# Patient Record
Sex: FEMALE | HISPANIC OR LATINO | Employment: FULL TIME | URBAN - METROPOLITAN AREA
[De-identification: names, ages, dates, MRNs, and addresses within clinical notes are randomized per-mention and may not be internally consistent; named-entity substitution may affect disease eponyms.]

---

## 2017-02-09 ENCOUNTER — OFFICE VISIT (OUTPATIENT)
Dept: INTERNAL MEDICINE | Facility: CLINIC | Age: 37
End: 2017-02-09
Payer: COMMERCIAL

## 2017-02-09 VITALS
DIASTOLIC BLOOD PRESSURE: 72 MMHG | TEMPERATURE: 97.3 F | OXYGEN SATURATION: 99 % | BODY MASS INDEX: 25.75 KG/M2 | HEART RATE: 80 BPM | HEIGHT: 61 IN | SYSTOLIC BLOOD PRESSURE: 112 MMHG | WEIGHT: 136.4 LBS

## 2017-02-09 DIAGNOSIS — R30.0 DYSURIA: Primary | ICD-10-CM

## 2017-02-09 DIAGNOSIS — B96.89 BACTERIAL VAGINOSIS: ICD-10-CM

## 2017-02-09 DIAGNOSIS — N76.0 BACTERIAL VAGINOSIS: ICD-10-CM

## 2017-02-09 LAB
ALBUMIN UR-MCNC: NEGATIVE MG/DL
APPEARANCE UR: CLEAR
BILIRUB UR QL STRIP: NEGATIVE
COLOR UR AUTO: YELLOW
GLUCOSE UR STRIP-MCNC: NEGATIVE MG/DL
HGB UR QL STRIP: ABNORMAL
KETONES UR STRIP-MCNC: NEGATIVE MG/DL
LEUKOCYTE ESTERASE UR QL STRIP: ABNORMAL
MICRO REPORT STATUS: ABNORMAL
NITRATE UR QL: NEGATIVE
NON-SQ EPI CELLS #/AREA URNS LPF: ABNORMAL /LPF
PH UR STRIP: 5.5 PH (ref 5–7)
RBC #/AREA URNS AUTO: ABNORMAL /HPF (ref 0–2)
SP GR UR STRIP: 1.02 (ref 1–1.03)
SPECIMEN SOURCE: ABNORMAL
URN SPEC COLLECT METH UR: ABNORMAL
UROBILINOGEN UR STRIP-ACNC: 0.2 EU/DL (ref 0.2–1)
WBC #/AREA URNS AUTO: ABNORMAL /HPF (ref 0–2)
WET PREP SPEC: ABNORMAL

## 2017-02-09 PROCEDURE — 99214 OFFICE O/P EST MOD 30 MIN: CPT | Performed by: INTERNAL MEDICINE

## 2017-02-09 PROCEDURE — 87086 URINE CULTURE/COLONY COUNT: CPT | Performed by: INTERNAL MEDICINE

## 2017-02-09 PROCEDURE — 87210 SMEAR WET MOUNT SALINE/INK: CPT | Performed by: INTERNAL MEDICINE

## 2017-02-09 PROCEDURE — 81001 URINALYSIS AUTO W/SCOPE: CPT | Performed by: INTERNAL MEDICINE

## 2017-02-09 RX ORDER — METRONIDAZOLE 500 MG/1
500 TABLET ORAL 2 TIMES DAILY
Qty: 14 TABLET | Refills: 0 | Status: SHIPPED | OUTPATIENT
Start: 2017-02-09 | End: 2017-02-27

## 2017-02-09 NOTE — PATIENT INSTRUCTIONS
For your bacterial vaginosis: Please take the antibiotics and let us know if you are not better after 3 complete days on it.    We will also process your urine culture and let you know if your urine grows any bacteria.      Vaginal Infection: Bacterial Vaginosis  Both good and bad bacteria are present in a healthy vagina. Bacterial vaginosis (BV) occurs when these bacteria get out of balance. The numbers of good bacteria decrease. This allows the numbers of bad bacteria to increase and cause BV. In most cases, BV is not a serious problem. This sheet tells you more about BV and how it can be treated.  Causes of Bacterial Vaginosis  The cause of BV is not clear. Douching may lead to it. Having sex with a new partner or more than 1 partner makes it more likely.  Symptoms of Bacterial Vaginosis  Symptoms of BV vary for each woman. Some women have few symptoms or none at all. If symptoms are present, they can include:    Thin, milky white or gray discharge    Unpleasant  fishy  odor    Irritation, itching, and burning at opening of vagina.    Burning or irritation with sex or urination  Diagnosing Bacterial Vaginosis  Your health care provider will ask about your symptoms and health history. He or she will also perform a pelvic exam. This is an exam of your vagina and cervix. A sample of vaginal fluid or discharge may be taken. This sample is checked for signs of BV.  Treating Bacterial Vaginosis  BV is often treated with antibiotics. They may be given in oral pill form or as a vaginal cream. To use these medications:    Be sure to take all of your medication, even if your symptoms go away.    If you re taking antibiotic pills, do not drink alcohol until you re finished with all of your medication.    If you re using vaginal cream, apply it as directed. Be aware that the cream may make condoms and diaphragms less effective.    Call your health care provider if symptoms do not go away within 4 days of starting treatment.  Also call if you have a reaction to the medication.  Why Treatment Matters  Even if you have no symptoms or your symptoms are mild, BV should be treated. Untreated BV can lead to health problems such as:    Increased risk of  delivery if you re pregnant.    Increased risk of complications after surgery on the reproductive organs.    Possible increased risk of pelvic inflammatory disease (PID).     3232-0640 [employer]. [address] [place] [address]. All rights reserved. This information is not intended as a substitute for professional medical care. Always follow your healthcare professional's instructions.

## 2017-02-09 NOTE — PROGRESS NOTES
SUBJECTIVE:                                                    Gogo Bourgeois is a 36 year old female who presents to clinic today for the following health issues:        URINARY TRACT SYMPTOMS      Duration: 3 days ago    Description  dysuria, frequency, urgency, odor and back pain    Intensity:  8/10    Accompanying signs and symptoms:  Fever/chills: no   Flank pain YES  Nausea and vomiting: no   Vaginal symptoms: discharge, odor and itching  Abdominal/Pelvic Pain: YES    History  History of frequent UTI's: YES  History of kidney stones: no   Sexually Active: YES  Possibility of pregnancy: No    Precipitating or alleviating factors: None    Therapies tried and outcome: increase fluid intake       She also had sleeve gastrectomy several years ago, followed by excision of loose skin 1-2 years later, both completed in [place]. One of those areas where loose skin was excised was in the groin area and she then noted 2-3 hard, small bumps there which become a bit tender at times. They do not seem to be growing. She did show them to her Ob/Gyn, who did not seem concerned.  She is requesting for me to assess these lesions, too.      History of breast augmentation surgery:  In [place] in 2012; she is not sure if the implants were saline vs silicone.  Had pain a for a week of the breasts, w/o trauma, now gone.    Problem list and histories reviewed & adjusted, as indicated.  Additional history: as documented    Patient Active Problem List   Diagnosis     S/P LEEP of cervix     Traction alopecia     S/P gastric bypass     Dyshidrotic eczema     Past Surgical History   Procedure Laterality Date     C removal gallbladder  12/2001     Laparoscopic bypass gastric  8/23/11     Sleeve done in [place]     Leep tx, cervical  2002     C mammogram, w/bilat. implants  2012     in [place]     Cosmetic abdominoplasty  2012     plus plast of arms and inner thighs     Tubal ligation  2014       Social History   Substance Use Topics  "    Smoking status: Never Smoker      Smokeless tobacco: Never Used      Comment: Nonsmoking household     Alcohol Use: No     Family History   Problem Relation Age of Onset     Hypertension Maternal Grandmother      Respiratory Paternal Grandmother      smoker     Alcohol/Drug Paternal Grandfather      alcohlism, cirrhosis of liver         Current Outpatient Prescriptions   Medication Sig Dispense Refill     metroNIDAZOLE (FLAGYL) 500 MG tablet Take 1 tablet (500 mg) by mouth 2 times daily 14 tablet 0     Multiple Vitamin (MULTI-VITAMIN PO)        ==============================================================  ROS:  Constitutional, HEENT, cardiovascular, pulmonary, GI, , musculoskeletal, neuro, skin, endocrine and psych systems are negative, except as otherwise noted.       OBJECTIVE:                                                    /72 mmHg  Pulse 80  Temp(Src) 97.3  F (36.3  C) (Oral)  Ht 5' 1\" (1.549 m)  Wt 136 lb 6.4 oz (61.871 kg)  BMI 25.79 kg/m2  SpO2 99%  Body mass index is 25.79 kg/(m^2).     GEN: not in acute distress  :   no abnormal vaginal or cervical lesions or discharge.   There are small, hard, mobile, and currently non-tender to palpation, ovoid, flesh-colored lesions about 0.3 x 0.7cm in size 2-3 total and found on either side of the labia majora.   Results for orders placed or performed in visit on 02/09/17   *UA reflex to Microscopic and Culture ([facility] (except [place] and [place])   Result Value Ref Range    Color Urine Yellow     Appearance Urine Clear     Glucose Urine Negative NEG mg/dL    Bilirubin Urine Negative NEG    Ketones Urine Negative NEG mg/dL    Specific Gravity Urine 1.025 1.003 - 1.035    Blood Urine Moderate (A) NEG    pH Urine 5.5 5.0 - 7.0 pH    Protein Albumin Urine Negative NEG mg/dL    Urobilinogen Urine 0.2 0.2 - 1.0 EU/dL    Nitrite Urine Negative NEG    Leukocyte Esterase Urine Small (A) NEG    Source Midstream Urine  "   Urine Microscopic   Result Value Ref Range    WBC Urine 2-5 (A) 0 - 2 /HPF    RBC Urine 25-50 (A) 0 - 2 /HPF    Squamous Epithelial /LPF Urine Few FEW /LPF   Wet prep   Result Value Ref Range    Specimen Description Vagina     Wet Prep No Trichomonas seen  Clue cells seen  Yeast seen   (A)     Micro Report Status FINAL 02/09/2017         ASSESSMENT/PLAN:                                                        ICD-10-CM    1. Dysuria R30.0 Wet prep     *UA reflex to Microscopic and Culture ([facility] (except [place] and [place])     Urine Microscopic     metroNIDAZOLE (FLAGYL) 500 MG tablet     Urine Culture Aerobic Bacterial   2. Bacterial vaginosis N76.0 metroNIDAZOLE (FLAGYL) 500 MG tablet    B96.89      Time spent coordinating care was approximately 30 minutes out of a total of approximately 40 minutes (which was the total duration of the appointment) including the diagnosis, prognosis and treatment of the above medical conditions.     Patient Instructions     For your bacterial vaginosis: Please take the antibiotics and let us know if you are not better after 3 complete days on it.    We will also process your urine culture and let you know if your urine grows any bacteria.      Vaginal Infection: Bacterial Vaginosis  Both good and bad bacteria are present in a healthy vagina. Bacterial vaginosis (BV) occurs when these bacteria get out of balance. The numbers of good bacteria decrease. This allows the numbers of bad bacteria to increase and cause BV. In most cases, BV is not a serious problem. This sheet tells you more about BV and how it can be treated.  Causes of Bacterial Vaginosis  The cause of BV is not clear. Douching may lead to it. Having sex with a new partner or more than 1 partner makes it more likely.  Symptoms of Bacterial Vaginosis  Symptoms of BV vary for each woman. Some women have few symptoms or none at all. If symptoms are present, they can include:    Thin,  milky white or gray discharge    Unpleasant  fishy  odor    Irritation, itching, and burning at opening of vagina.    Burning or irritation with sex or urination  Diagnosing Bacterial Vaginosis  Your health care provider will ask about your symptoms and health history. He or she will also perform a pelvic exam. This is an exam of your vagina and cervix. A sample of vaginal fluid or discharge may be taken. This sample is checked for signs of BV.  Treating Bacterial Vaginosis  BV is often treated with antibiotics. They may be given in oral pill form or as a vaginal cream. To use these medications:    Be sure to take all of your medication, even if your symptoms go away.    If you re taking antibiotic pills, do not drink alcohol until you re finished with all of your medication.    If you re using vaginal cream, apply it as directed. Be aware that the cream may make condoms and diaphragms less effective.    Call your health care provider if symptoms do not go away within 4 days of starting treatment. Also call if you have a reaction to the medication.  Why Treatment Matters  Even if you have no symptoms or your symptoms are mild, BV should be treated. Untreated BV can lead to health problems such as:    Increased risk of  delivery if you re pregnant.    Increased risk of complications after surgery on the reproductive organs.    Possible increased risk of pelvic inflammatory disease (PID).     5418-8262 [employer]. [address] [place] [address], [place] [address]. All rights reserved. This information is not intended as a substitute for professional medical care. Always follow your healthcare professional's instructions.                        Reva Fierro MD  [facility]

## 2017-02-09 NOTE — NURSING NOTE
"Chief Complaint   Patient presents with     UTI       Initial /72 mmHg  Pulse 80  Temp(Src) 97.3  F (36.3  C) (Oral)  Ht 5' 1\" (1.549 m)  Wt 136 lb 6.4 oz (61.871 kg)  BMI 25.79 kg/m2  SpO2 99% Estimated body mass index is 25.79 kg/(m^2) as calculated from the following:    Height as of this encounter: 5' 1\" (1.549 m).    Weight as of this encounter: 136 lb 6.4 oz (61.871 kg).  BP completed using cuff size: regular    Hortencia Su CMA    "

## 2017-02-09 NOTE — MR AVS SNAPSHOT
After Visit Summary   2/9/2017    Gogo Bourgeois    MRN: [id number]           Patient Information     Date Of Birth          1980        Visit Information        Provider Department      2/9/2017 7:30 AM Reva Fierro MD [facility]        Today's Diagnoses     Dysuria    -  1     Bacterial vaginosis           Care Instructions    For your bacterial vaginosis: Please take the antibiotics and let us know if you are not better after 3 complete days on it.    We will also process your urine culture and let you know if your urine grows any bacteria.      Vaginal Infection: Bacterial Vaginosis  Both good and bad bacteria are present in a healthy vagina. Bacterial vaginosis (BV) occurs when these bacteria get out of balance. The numbers of good bacteria decrease. This allows the numbers of bad bacteria to increase and cause BV. In most cases, BV is not a serious problem. This sheet tells you more about BV and how it can be treated.  Causes of Bacterial Vaginosis  The cause of BV is not clear. Douching may lead to it. Having sex with a new partner or more than 1 partner makes it more likely.  Symptoms of Bacterial Vaginosis  Symptoms of BV vary for each woman. Some women have few symptoms or none at all. If symptoms are present, they can include:    Thin, milky white or gray discharge    Unpleasant  fishy  odor    Irritation, itching, and burning at opening of vagina.    Burning or irritation with sex or urination  Diagnosing Bacterial Vaginosis  Your health care provider will ask about your symptoms and health history. He or she will also perform a pelvic exam. This is an exam of your vagina and cervix. A sample of vaginal fluid or discharge may be taken. This sample is checked for signs of BV.  Treating Bacterial Vaginosis  BV is often treated with antibiotics. They may be given in oral pill form or as a vaginal cream. To use these medications:    Be sure to take all of your  medication, even if your symptoms go away.    If you re taking antibiotic pills, do not drink alcohol until you re finished with all of your medication.    If you re using vaginal cream, apply it as directed. Be aware that the cream may make condoms and diaphragms less effective.    Call your health care provider if symptoms do not go away within 4 days of starting treatment. Also call if you have a reaction to the medication.  Why Treatment Matters  Even if you have no symptoms or your symptoms are mild, BV should be treated. Untreated BV can lead to health problems such as:    Increased risk of  delivery if you re pregnant.    Increased risk of complications after surgery on the reproductive organs.    Possible increased risk of pelvic inflammatory disease (PID).     7734-8130 [employer]. [address] [place] [address], [place] [address]. All rights reserved. This information is not intended as a substitute for professional medical care. Always follow your healthcare professional's instructions.              Follow-ups after your visit        Who to contact     If you have questions or need follow up information about today's clinic visit or your schedule please contact [facility] directly at [phone].  Normal or non-critical lab and imaging results will be communicated to you by [employer], letter or phone within 4 business days after the clinic has received the results. If you do not hear from us within 7 days, please contact the clinic through [employer] or phone. If you have a critical or abnormal lab result, we will notify you by phone as soon as possible.  Submit refill requests through [employer] or call your pharmacy and they will forward the refill request to us. Please allow 3 business days for your refill to be completed.          Additional Information About Your Visit        [employer] Information     [employer] lets you send messages to your doctor, view your test results, renew your  "prescriptions, schedule appointments and more. To sign up, go to [email or web address] . Click on \"Log in\" on the left side of the screen, which will take you to the Welcome page. Then click on \"Sign up Now\" on the right side of the page.     You will be asked to enter the access code listed below, as well as some personal information. Please follow the directions to create your username and password.     Your access code is: [email or web address]  Expires: 3/4/2017 11:48 AM     Your access code will  in 90 days. If you need help or a new code, please call your [facility] clinic or [phone].        Care EveryWhere ID     This is your Care EveryWhere ID. This could be used by other organizations to access your [facility] medical records  [id number]        Your Vitals Were     Pulse Temperature Height BMI (Body Mass Index) Pulse Oximetry       80 97.3  F (36.3  C) (Oral) 5' 1\" (1.549 m) 25.79 kg/m2 99%        Blood Pressure from Last 3 Encounters:   17 112/72   16 100/68   16 106/69    Weight from Last 3 Encounters:   17 136 lb 6.4 oz (61.871 kg)   16 138 lb (62.596 kg)   16 136 lb (61.689 kg)              We Performed the Following     *UA reflex to Microscopic and Culture ([facility] (except [place] and [place])     Urine Culture Aerobic Bacterial     Urine Microscopic     Wet prep          Today's Medication Changes          These changes are accurate as of: 17  8:36 AM.  If you have any questions, ask your nurse or doctor.               Start taking these medicines.        Dose/Directions    metroNIDAZOLE 500 MG tablet   Commonly known as:  FLAGYL   Used for:  Dysuria, Bacterial vaginosis   Started by:  Reva Fierro MD        Dose:  500 mg   Take 1 tablet (500 mg) by mouth 2 times daily   Quantity:  14 tablet   Refills:  0            Where to get your medicines      These medications were sent to [place] [address] [place] [facility], " [place] - [address]  [address] [place] [address]     Phone:  [phone]    - metroNIDAZOLE 500 MG tablet             Primary Care Provider Office Phone # Fax #    Paul Weber PA-C [phone]       [facility] [place] [address]  [place] [address]        Thank you!     Thank you for choosing [facility]  for your care. Our goal is always to provide you with excellent care. Hearing back from our patients is one way we can continue to improve our services. Please take a few minutes to complete the written survey that you may receive in the mail after your visit with us. Thank you!             Your Updated Medication List - Protect others around you: Learn how to safely use, store and throw away your medicines at www.disposemymeds.org.          This list is accurate as of: 2/9/17  8:36 AM.  Always use your most recent med list.                   Brand Name Dispense Instructions for use    metroNIDAZOLE 500 MG tablet    FLAGYL    14 tablet    Take 1 tablet (500 mg) by mouth 2 times daily       MULTI-VITAMIN PO

## 2017-02-10 LAB
BACTERIA SPEC CULT: NORMAL
MICRO REPORT STATUS: NORMAL
SPECIMEN SOURCE: NORMAL

## 2017-02-13 ENCOUNTER — TELEPHONE (OUTPATIENT)
Dept: INTERNAL MEDICINE | Facility: CLINIC | Age: 37
End: 2017-02-13

## 2017-02-13 NOTE — TELEPHONE ENCOUNTER
Patient calling regarding 2/9/17 urine cx. To provider to review and advise.  Thank you, MARITZA GreenN RN

## 2017-02-13 NOTE — TELEPHONE ENCOUNTER
He urine culture did not grow any bacteria.  She should complete the course of Flagyl for the bacterial vaginosis.    Thank you,  Reva Fierro MD

## 2017-02-14 NOTE — TELEPHONE ENCOUNTER
Spoke with patient.  Aware test results and MD's instructions.  States understanding.  Kaylyn Aguirre RN

## 2017-02-21 ENCOUNTER — TELEPHONE (OUTPATIENT)
Dept: FAMILY MEDICINE | Facility: CLINIC | Age: 37
End: 2017-02-21

## 2017-02-21 DIAGNOSIS — N89.8 VAGINAL DISCHARGE: Primary | ICD-10-CM

## 2017-02-21 NOTE — TELEPHONE ENCOUNTER
Patient calling to report she Finished antibiotics as instructed. Feels likes vaginal burning and itching are same and sometimes worse as when seen in clinic on 2/9. Still has yellow vaginal discharge. No pain. To provider to advise.  Shruthi Sweeney RN

## 2017-02-21 NOTE — TELEPHONE ENCOUNTER
Please have the patient come to the lab to submit a urine sample and self-collect wet prep (I put future orders)-I will advise her further, based on the results.    Thank you,  Reva Fierro MD

## 2017-02-21 NOTE — TELEPHONE ENCOUNTER
She finished the antibiotics and still has the same vaginitis symptoms.  Please call today to advise.

## 2017-02-21 NOTE — TELEPHONE ENCOUNTER
Called patient, informed pt of providers note as written below, pt verbalized good understanding.  She has a lab appointment Thursday and will complete labs at that time.  MARITZA GreenN RN

## 2017-02-23 ENCOUNTER — TELEPHONE (OUTPATIENT)
Dept: FAMILY MEDICINE | Facility: CLINIC | Age: 37
End: 2017-02-23

## 2017-02-23 DIAGNOSIS — Z13.220 LIPID SCREENING: ICD-10-CM

## 2017-02-23 DIAGNOSIS — R31.29 MICROSCOPIC HEMATURIA: Primary | ICD-10-CM

## 2017-02-23 DIAGNOSIS — N89.8 VAGINAL DISCHARGE: ICD-10-CM

## 2017-02-23 DIAGNOSIS — Z13.1 SCREENING FOR DIABETES MELLITUS: ICD-10-CM

## 2017-02-23 LAB
ALBUMIN UR-MCNC: NEGATIVE MG/DL
APPEARANCE UR: CLEAR
BILIRUB UR QL STRIP: NEGATIVE
CHOLEST SERPL-MCNC: 166 MG/DL
COLOR UR AUTO: YELLOW
GLUCOSE SERPL-MCNC: 78 MG/DL (ref 70–99)
GLUCOSE UR STRIP-MCNC: NEGATIVE MG/DL
HDLC SERPL-MCNC: 75 MG/DL
HGB UR QL STRIP: ABNORMAL
KETONES UR STRIP-MCNC: NEGATIVE MG/DL
LDLC SERPL CALC-MCNC: 80 MG/DL
LEUKOCYTE ESTERASE UR QL STRIP: NEGATIVE
MICRO REPORT STATUS: ABNORMAL
NITRATE UR QL: NEGATIVE
NON-SQ EPI CELLS #/AREA URNS LPF: ABNORMAL /LPF
NONHDLC SERPL-MCNC: 91 MG/DL
PH UR STRIP: 7.5 PH (ref 5–7)
RBC #/AREA URNS AUTO: ABNORMAL /HPF (ref 0–2)
SP GR UR STRIP: 1.01 (ref 1–1.03)
SPECIMEN SOURCE: ABNORMAL
TRIGL SERPL-MCNC: 57 MG/DL
URN SPEC COLLECT METH UR: ABNORMAL
UROBILINOGEN UR STRIP-ACNC: 0.2 EU/DL (ref 0.2–1)
WBC #/AREA URNS AUTO: ABNORMAL /HPF (ref 0–2)
WET PREP SPEC: ABNORMAL

## 2017-02-23 PROCEDURE — 82947 ASSAY GLUCOSE BLOOD QUANT: CPT | Performed by: PHYSICIAN ASSISTANT

## 2017-02-23 PROCEDURE — 81001 URINALYSIS AUTO W/SCOPE: CPT | Performed by: INTERNAL MEDICINE

## 2017-02-23 PROCEDURE — 87210 SMEAR WET MOUNT SALINE/INK: CPT | Performed by: INTERNAL MEDICINE

## 2017-02-23 PROCEDURE — 80061 LIPID PANEL: CPT | Performed by: PHYSICIAN ASSISTANT

## 2017-02-23 PROCEDURE — 36415 COLL VENOUS BLD VENIPUNCTURE: CPT | Performed by: PHYSICIAN ASSISTANT

## 2017-02-23 NOTE — LETTER
[address] [address] [phone]        February 24, 2017    Gogo Bourgeois  [address] [address] [facility] [address]            Dear Gogo.      Your recent tests show two important results:  1) You still have evidence of bacterial vaginosis. Please repeat the weeklong treatment of Metronidazole (will send prescription).  Additionally: do not use douches. After bowel movements, wipe from front to back, away from the vagina. Try taking probiotics with lactobacillus for a month.  2) Review of your records shows that you have had some blood in your urine with every urine test this past year. This is not known to be associated with bacterial vaginosis.  Please call [phone] to schedule an appointment with a Urologist to discuss this.     Please call our clinic at [phone] if you have any questions.     Reva Fierro MD/ms    Results for orders placed or performed in visit on 02/23/17   Lipid panel reflex to direct LDL   Result Value Ref Range    Cholesterol 166 <200 mg/dL    Triglycerides 57 <150 mg/dL    HDL Cholesterol 75 >49 mg/dL    LDL Cholesterol Calculated 80 <100 mg/dL    Non HDL Cholesterol 91 <130 mg/dL   Glucose   Result Value Ref Range    Glucose 78 70 - 99 mg/dL   *UA reflex to Microscopic and Culture ([facility] (except [place] and [place])   Result Value Ref Range    Color Urine Yellow     Appearance Urine Clear     Glucose Urine Negative NEG mg/dL    Bilirubin Urine Negative NEG    Ketones Urine Negative NEG mg/dL    Specific Gravity Urine 1.010 1.003 - 1.035    Blood Urine Large (A) NEG    pH Urine 7.5 (H) 5.0 - 7.0 pH    Protein Albumin Urine Negative NEG mg/dL    Urobilinogen Urine 0.2 0.2 - 1.0 EU/dL    Nitrite Urine Negative NEG    Leukocyte Esterase Urine Negative NEG    Source Midstream Urine    Urine Microscopic   Result Value Ref Range    WBC Urine O - 2 0 - 2 /HPF    RBC Urine 5-10 (A) 0 -  2 /HPF    Squamous Epithelial /LPF Urine Few FEW /LPF   Wet prep   Result Value Ref Range    Specimen Description Vagina     Wet Prep (A)      No Trichomonas seen  No yeast seen  Clue cells seen      Micro Report Status FINAL 02/23/2017

## 2017-02-23 NOTE — LETTER
[facility]  [address]  [place]  [place] [address]  [phone]      Gogo Bourgeois  [address] [address] [address]      February 28, 2017      Dear Gogo,      Your cholesterol panel and glucose are normal.     Please call me with any questions or concerns at [phone]       Sincerely,      Miladys Dominguez PA-C/[facility]

## 2017-02-24 NOTE — TELEPHONE ENCOUNTER
Pt notified of provider message as written.  Pt verbalized good understanding.  Pt requested same message be left on her vm.  Done.   please send in mail also.  Anel Dominique RN

## 2017-02-24 NOTE — TELEPHONE ENCOUNTER
Please call and advise the patient as follows, and also send a letter with the same text and attach recent test results [statements which are in bold and in square brackets, like this sentence, is for clinic staff and should not be included in the text of the letter to the patient]:    Dear Gogo.     Your recent tests show two important results:  1) You still have evidence of bacterial vaginosis. Please repeat the weeklong treatment of Metronidazole (will send prescription).  Additionally: do not use douches. After bowel movements, wipe from front to back, away from the vagina. Try taking probiotics with lactobacillus for a month.  2) Review of your records shows that you have had some blood in your urine with every urine test this past year.  This is not known to be associated with bacterial vaginosis.  Please call [phone] to schedule an appointment with a Urologist to discuss this.    Please call our clinic at [phone] if you have any questions.    Reva Fierro MD

## 2017-02-25 ENCOUNTER — TELEPHONE (OUTPATIENT)
Dept: NURSING | Facility: CLINIC | Age: 37
End: 2017-02-25

## 2017-02-25 NOTE — TELEPHONE ENCOUNTER
Triage Addendum  [id number]  Presenting Problem: Pt states receiving a call yesterday regarding  returning bacterial vaginosis and that she should repeat course of  Flagyl, that prescription would be sent. Writer checked chart and  found this message documented by provider Dr Fierro 2/23/17 at  7:04PM in visit notes but not reordered in medication section.  Triage Note:  Hortencia Jorge added this note on Feb 25 2017  3:51PM:  Writer Ozzie paged on call Dr Sousa @ 3:32PM and received callback to  order Flagyl as previously written 2/9/17 by Dr Tom. The following  order was called into [facility] @ [phone], pharmacist Dipika:  metroNIDAZOLE (FLAGYL) 500 mg tablet, Sig: Take 1 tablet (500 mg) by mouth  2 times daily, Disp: 14 tablet, Refills: 0. Pt was called back and informed  that prescription was called into her requested pharmacy.  Guideline Title: Medication Questions - Adult  Recommended Disposition: Call Provider Immediately  Original Inclination: Wanted to speak with a nurse  Override Disposition:  Intended Action: Call PCP/HCP  Physician Contacted: Yes  Prescription ordered today and not available at pharmacy putting patient at  clinical risk ?  YES  Sign(s) or symptom(s) associated with a diagnosed condition or with a new illness  ? NO  Pharmacy calling to clarify prescription order. ? NO  Physician Instructions:  Care Advice:

## 2017-02-27 DIAGNOSIS — B96.89 BACTERIAL VAGINOSIS: ICD-10-CM

## 2017-02-27 DIAGNOSIS — R30.0 DYSURIA: ICD-10-CM

## 2017-02-27 DIAGNOSIS — N76.0 BACTERIAL VAGINOSIS: ICD-10-CM

## 2017-02-27 RX ORDER — METRONIDAZOLE 500 MG/1
500 TABLET ORAL 2 TIMES DAILY
Qty: 14 TABLET | Refills: 0 | Status: SHIPPED | OUTPATIENT
Start: 2017-02-27 | End: 2017-04-19

## 2017-02-28 NOTE — PROGRESS NOTES
Please send the following letter to the patient:    Gogo,    Your cholesterol panel and glucose are normal.    Please call me with any questions or concerns.          Miladys Dominguez PA-C

## 2017-03-10 ENCOUNTER — OFFICE VISIT (OUTPATIENT)
Dept: UROLOGY | Facility: CLINIC | Age: 37
End: 2017-03-10
Payer: COMMERCIAL

## 2017-03-10 VITALS — HEART RATE: 68 BPM | RESPIRATION RATE: 14 BRPM | DIASTOLIC BLOOD PRESSURE: 60 MMHG | SYSTOLIC BLOOD PRESSURE: 90 MMHG

## 2017-03-10 DIAGNOSIS — R31.29 MICROSCOPIC HEMATURIA: ICD-10-CM

## 2017-03-10 DIAGNOSIS — N39.0 RECURRENT UTI: Primary | ICD-10-CM

## 2017-03-10 PROCEDURE — 99204 OFFICE O/P NEW MOD 45 MIN: CPT | Mod: 25 | Performed by: UROLOGY

## 2017-03-10 PROCEDURE — 52000 CYSTOURETHROSCOPY: CPT | Performed by: UROLOGY

## 2017-03-10 RX ORDER — NITROFURANTOIN MACROCRYSTALS 50 MG/1
50 CAPSULE ORAL DAILY PRN
Qty: 90 CAPSULE | Refills: 1 | Status: SHIPPED | OUTPATIENT
Start: 2017-03-10 | End: 2017-05-10

## 2017-03-10 NOTE — PATIENT INSTRUCTIONS
Please call  to schedule the renal ultrasound. Drink 12oz water prior to your ultrasound. Do not urinate prior to the test.  We will contact you with results.   Please call our office if you have questions, [phone].

## 2017-03-10 NOTE — NURSING NOTE
"Chief Complaint   Patient presents with     Consult     microscopic hematuria       Initial BP 90/60 (BP Location: Right arm, Patient Position: Chair, Cuff Size: Adult Regular)  Pulse 68  Resp 14 Estimated body mass index is 25.77 kg/(m^2) as calculated from the following:    Height as of 2/9/17: 1.549 m (5' 1\").    Weight as of 2/9/17: 61.9 kg (136 lb 6.4 oz).  Medication Reconciliation: complete   Christine Lopez, SOLEDAD      "

## 2017-03-10 NOTE — PROGRESS NOTES
Gogo Kent is a pleasant 36-year-old female who was requested to be seen by Dr. Reva Fierro for a consultation with regard to patient's recurrent urinary tract infections and microscopic hematuria.  The patient said that since she has been with her new partner for the past 5 years, she has recurrent urinary tract infections.  The symptoms are mainly urinary tract symptoms of frequency, urgency, dysuria.  Antibiotics usually help.  She has been treated several times for it recently.  Urine cultures have shown evidence of E. coli and also mixed bacteria.  The patient also has some issues with bacterial vaginosis, which also has been treated in the past.  She has microscopic hematuria.  Recent UA showed 5-10 red blood cells per high-power field.  She denies any history of kidney stones, kidney diseases in the past.  She has no history of smoking in the past.  She denies any gross hematuria.      Cystoscopy performed today.      PROCEDURE:  The patient was brought to the cystoscopy suite and placed in dorsal lithotomy position.  She was draped and prepped in the usual surgical fashion.  A flexible cystoscope was then placed into the bladder under direct vision.  The bladder was examined thoroughly.  There was no stone, tumor identified.  Urethra was unremarkable.      ASSESSMENT:   1.  Microscopic hematuria with negative cystoscopy today.  We will schedule the patient for a renal ultrasound.   2.  Recurrent urinary tract infection, most likely secondary to sexual activities.  We will start patient on Macrodantin 50 mg to be taken after or before sexual activities.  I told her to take it for 6 months and stop and see what happens.  If it comes back again, she should come back to see me for a reexamination.         PRISCILA MCCARTY MD             D: 03/10/2017 09:26   T: 03/10/2017 09:46   MT: BD      Name:     GOGO KENT   MRN:      [id number]        Account:      [id number]   :      1980            Visit Date:   03/10/2017      Document: [id number]       cc: Reva Fierro MD

## 2017-03-10 NOTE — MR AVS SNAPSHOT
"              After Visit Summary   3/10/2017    Gogo Bourgeois    MRN: [id number]           Patient Information     Date Of Birth          1980        Visit Information        Provider Department      3/10/2017 8:30 AM Edson Ragland MD [facility]        Today's Diagnoses     Recurrent UTI    -  1    Microscopic hematuria          Care Instructions      Please call  to schedule the renal ultrasound. Drink 12oz water prior to your ultrasound. Do not urinate prior to the test.  We will contact you with results.   Please call our office if you have questions, [phone].            Follow-ups after your visit        Future tests that were ordered for you today     Open Future Orders        Priority Expected Expires Ordered    US Renal Complete Routine 3/10/2017 3/10/2018 3/10/2017            Who to contact     If you have questions or need follow up information about today's clinic visit or your schedule please contact [facility] directly at [phone].  Normal or non-critical lab and imaging results will be communicated to you by [email or web address], letter or phone within 4 business days after the clinic has received the results. If you do not hear from us within 7 days, please contact the clinic through [email or web address] or phone. If you have a critical or abnormal lab result, we will notify you by phone as soon as possible.  Submit refill requests through Rizzoma or call your pharmacy and they will forward the refill request to us. Please allow 3 business days for your refill to be completed.          Additional Information About Your Visit        [email or web address] Information     Rizzoma lets you send messages to your doctor, view your test results, renew your prescriptions, schedule appointments and more. To sign up, go to [email or web address] . Click on \"Log in\" on the left side of the screen, which will take you to the Welcome page. Then click on \"Sign up Now\" on the right side of the " page.     You will be asked to enter the access code listed below, as well as some personal information. Please follow the directions to create your username and password.     Your access code is: [email or web address]  Expires: 2017  9:20 AM     Your access code will  in 90 days. If you need help or a new code, please call your [facility] clinic or [phone].        Care EveryWhere ID     This is your Care EveryWhere ID. This could be used by other organizations to access your [facility] medical records  [id number]        Your Vitals Were     Pulse Respirations                68 14           Blood Pressure from Last 3 Encounters:   03/10/17 90/60   17 112/72   16 100/68    Weight from Last 3 Encounters:   17 61.9 kg (136 lb 6.4 oz)   16 62.6 kg (138 lb)   16 61.7 kg (136 lb)              We Performed the Following     CYSTOURETHROSCOPY          Today's Medication Changes          These changes are accurate as of: 3/10/17  9:28 AM.  If you have any questions, ask your nurse or doctor.               Start taking these medicines.        Dose/Directions    nitrofurantoin 50 MG capsule   Commonly known as:  MACRODANTIN   Used for:  Recurrent UTI   Started by:  Edson Ragland MD        Dose:  50 mg   Take 1 capsule (50 mg) by mouth daily as needed Take one pill before or after sex   Quantity:  90 capsule   Refills:  1            Where to get your medicines      These medications were sent to [facility] - [place] [facility] - [address]  [address], [place] [address]     Phone:  [phone]     nitrofurantoin 50 MG capsule                Primary Care Provider Office Phone # Fax #    Paul Weber PA-C [phone]       [place] [address] [place] [address]        Thank you!     Thank you for choosing [facility]  for your care. Our goal is always to provide you with excellent care. Hearing back  from our patients is one way we can continue to improve our services. Please take a few minutes to complete the written survey that you may receive in the mail after your visit with us. Thank you!             Your Updated Medication List - Protect others around you: Learn how to safely use, store and throw away your medicines at www.disposemymeds.org.          This list is accurate as of: 3/10/17  9:28 AM.  Always use your most recent med list.                   Brand Name Dispense Instructions for use    FLONASE ALLERGY RELIEF NA          metroNIDAZOLE 500 MG tablet    FLAGYL    14 tablet    Take 1 tablet (500 mg) by mouth 2 times daily       MULTI-VITAMIN PO      Reported on 3/10/2017       nitrofurantoin 50 MG capsule    MACRODANTIN    90 capsule    Take 1 capsule (50 mg) by mouth daily as needed Take one pill before or after sex       SOLUBLE FIBER/PROBIOTICS PO

## 2017-03-10 NOTE — PROGRESS NOTES
S: See dictated note   Current Outpatient Prescriptions   Medication Sig Dispense Refill     Probiotic Product (SOLUBLE FIBER/PROBIOTICS PO)        Fluticasone Propionate (FLONASE ALLERGY RELIEF NA)        metroNIDAZOLE (FLAGYL) 500 MG tablet Take 1 tablet (500 mg) by mouth 2 times daily (Patient not taking: Reported on 3/10/2017) 14 tablet 0     Multiple Vitamin (MULTI-VITAMIN PO) Reported on 3/10/2017       Allergies   Allergen Reactions     Nkda [No Known Drug Allergies]      Seasonal Allergies      Past Medical History   Diagnosis Date     Abnormal Pap smear of cervix      treated w/ cryotherapy in      ASCUS on Pap smear 10/2013     Neg for high risk HPV     Esophageal reflux      LSIL (low grade squamous intraepithelial lesion) on Pap smear 13     repeat pap 6 months     Overweight, obesity and other hyperalimentation      Papanicolaou smear of cervix with low grade squamous intraepithelial lesion (LGSIL) 6/15/12     2012 colp - SUE 1     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 implants  2012     in [place]     Cosmetic abdominoplasty  2012     plus plast of arms and inner thighs     Tubal ligation  2014      Family History   Problem Relation Age of Onset     Hypertension Maternal Grandmother      Respiratory Paternal Grandmother      smoker     Alcohol/Drug Paternal Grandfather      alcohlism, cirrhosis of liver     Social History     Social History     Marital status: Single     Spouse name: N/A     Number of children: N/A     Years of education: N/A     Social History Main Topics     Smoking status: Never Smoker     Smokeless tobacco: Never Used      Comment: Nonsmoking household     Alcohol use No     Drug use: No     Sexual activity: Yes     Partners: Male      Birth control/ protection: Female Surgical      Comment: tubal ligation     Other Topics Concern     Parent/Sibling W/ Cabg, Mi Or Angioplasty Before 65f 55m? No     Social History Narrative       REVIEW OF SYSTEMS  =================  C: NEGATIVE for fever, chills, change in weight  I: NEGATIVE for worrisome rashes, moles or lesions  E/M: NEGATIVE for ear, mouth and throat problems  R: NEGATIVE for significant cough or SHORTNESS OF BREATH  CV:  NEGATIVE for chest pain, palpitations or peripheral edema  GI: NEGATIVE for nausea, abdominal pain, heartburn, or change in bowel habits  NEURO: NEGATIVE numbness/weakness  : see HPI  PSYCH: NEGATIVE depression/anxiety  LYmph: no new enlarged lymph nodes  Ortho: no new trauma/movements      Physical Exam:  BP 90/60 (BP Location: Right arm, Patient Position: Chair, Cuff Size: Adult Regular)  Pulse 68  Resp 14   Patient is pleasant, in no acute distress, good general condition.  HEENT:  Normalcephalic, atraumatic  Lung: no evidence of respiratory distress    Abdomen: Soft, nondistended, non tender. No masses. No rebound or guarding.   Exam: normal urethra.  No abnormal vaginal discharge or prolapse.  No cva or suprapubic tenderness.  Bladder scan minimal residual urine.  Skin: Warm and dry.  No redness.  Neuro: grossly normal  Psych normal mood and affect  Musculoskeletal  moving all extremities    Assessment/Plan:   See dictated note

## 2017-03-17 ENCOUNTER — RADIANT APPOINTMENT (OUTPATIENT)
Dept: ULTRASOUND IMAGING | Facility: CLINIC | Age: 37
End: 2017-03-17
Attending: UROLOGY
Payer: COMMERCIAL

## 2017-03-17 DIAGNOSIS — N39.0 RECURRENT UTI: ICD-10-CM

## 2017-03-17 PROCEDURE — 76770 US EXAM ABDO BACK WALL COMP: CPT

## 2017-04-19 ENCOUNTER — OFFICE VISIT (OUTPATIENT)
Dept: URGENT CARE | Facility: URGENT CARE | Age: 37
End: 2017-04-19
Payer: COMMERCIAL

## 2017-04-19 VITALS
SYSTOLIC BLOOD PRESSURE: 110 MMHG | TEMPERATURE: 99.2 F | WEIGHT: 137.8 LBS | HEART RATE: 92 BPM | BODY MASS INDEX: 26.04 KG/M2 | DIASTOLIC BLOOD PRESSURE: 76 MMHG

## 2017-04-19 DIAGNOSIS — R07.0 THROAT PAIN: ICD-10-CM

## 2017-04-19 DIAGNOSIS — B37.31 CANDIDAL VAGINITIS: Primary | ICD-10-CM

## 2017-04-19 DIAGNOSIS — J30.2 SEASONAL ALLERGIC RHINITIS, UNSPECIFIED ALLERGIC RHINITIS TRIGGER: ICD-10-CM

## 2017-04-19 DIAGNOSIS — R30.0 DYSURIA: ICD-10-CM

## 2017-04-19 LAB
ALBUMIN UR-MCNC: NEGATIVE MG/DL
APPEARANCE UR: CLEAR
BACTERIA #/AREA URNS HPF: ABNORMAL /HPF
BILIRUB UR QL STRIP: NEGATIVE
COLOR UR AUTO: YELLOW
DEPRECATED S PYO AG THROAT QL EIA: NORMAL
GLUCOSE UR STRIP-MCNC: NEGATIVE MG/DL
HGB UR QL STRIP: ABNORMAL
KETONES UR STRIP-MCNC: NEGATIVE MG/DL
LEUKOCYTE ESTERASE UR QL STRIP: ABNORMAL
MICRO REPORT STATUS: ABNORMAL
MICRO REPORT STATUS: NORMAL
NITRATE UR QL: NEGATIVE
NON-SQ EPI CELLS #/AREA URNS LPF: ABNORMAL /LPF
PH UR STRIP: 7 PH (ref 5–7)
RBC #/AREA URNS AUTO: ABNORMAL /HPF (ref 0–2)
SP GR UR STRIP: 1.01 (ref 1–1.03)
SPECIMEN SOURCE: ABNORMAL
SPECIMEN SOURCE: NORMAL
URN SPEC COLLECT METH UR: ABNORMAL
UROBILINOGEN UR STRIP-ACNC: 0.2 EU/DL (ref 0.2–1)
WBC #/AREA URNS AUTO: ABNORMAL /HPF (ref 0–2)
WET PREP SPEC: ABNORMAL

## 2017-04-19 PROCEDURE — 99214 OFFICE O/P EST MOD 30 MIN: CPT | Performed by: FAMILY MEDICINE

## 2017-04-19 PROCEDURE — 87880 STREP A ASSAY W/OPTIC: CPT | Performed by: FAMILY MEDICINE

## 2017-04-19 PROCEDURE — 87081 CULTURE SCREEN ONLY: CPT | Mod: 59 | Performed by: FAMILY MEDICINE

## 2017-04-19 PROCEDURE — 81001 URINALYSIS AUTO W/SCOPE: CPT | Performed by: FAMILY MEDICINE

## 2017-04-19 PROCEDURE — 87210 SMEAR WET MOUNT SALINE/INK: CPT | Performed by: FAMILY MEDICINE

## 2017-04-19 PROCEDURE — 87491 CHLMYD TRACH DNA AMP PROBE: CPT | Performed by: FAMILY MEDICINE

## 2017-04-19 PROCEDURE — 87591 N.GONORRHOEAE DNA AMP PROB: CPT | Performed by: FAMILY MEDICINE

## 2017-04-19 PROCEDURE — 87086 URINE CULTURE/COLONY COUNT: CPT | Performed by: FAMILY MEDICINE

## 2017-04-19 PROCEDURE — 87088 URINE BACTERIA CULTURE: CPT | Mod: 59 | Performed by: FAMILY MEDICINE

## 2017-04-19 RX ORDER — FLUTICASONE PROPIONATE 50 MCG
1-2 SPRAY, SUSPENSION (ML) NASAL DAILY
Qty: 3 BOTTLE | Refills: 11 | Status: SHIPPED | OUTPATIENT
Start: 2017-04-19 | End: 2018-06-12

## 2017-04-19 RX ORDER — FLUCONAZOLE 150 MG/1
150 TABLET ORAL ONCE
Qty: 2 TABLET | Refills: 0 | Status: SHIPPED | OUTPATIENT
Start: 2017-04-19 | End: 2017-04-19

## 2017-04-19 NOTE — LETTER
[facility]  [address] [place] [address]        April 21, 2017    Gogo Bourgeois  [address] [address]            Dear Gogo,    The results of your recent tests were normal.  Below is a copy of the results.  It was a pleasure to see you at your last appointment.    If you have any questions or concerns, please call myself or my nurse at [phone].    Sincerely,    Emily Blanchard MD/ks    Results for orders placed or performed in visit on 04/19/17   *UA reflex to Microscopic and Culture ([place] [facility] (except [place] and [place])   Result Value Ref Range    Color Urine Yellow     Appearance Urine Clear     Glucose Urine Negative NEG mg/dL    Bilirubin Urine Negative NEG    Ketones Urine Negative NEG mg/dL    Specific Gravity Urine 1.010 1.003 - 1.035    Blood Urine Moderate (A) NEG    pH Urine 7.0 5.0 - 7.0 pH    Protein Albumin Urine Negative NEG mg/dL    Urobilinogen Urine 0.2 0.2 - 1.0 EU/dL    Nitrite Urine Negative NEG    Leukocyte Esterase Urine Small (A) NEG    Source Midstream Urine    Urine Microscopic   Result Value Ref Range    WBC Urine 2-5 (A) 0 - 2 /HPF    RBC Urine O - 2 0 - 2 /HPF    Squamous Epithelial /LPF Urine Few FEW /LPF    Bacteria Urine Few (A) NEG /HPF   Wet prep   Result Value Ref Range    Specimen Description Vagina     Wet Prep (A)      Yeast seen  No Trichomonas seen  No clue cells seen      Micro Report Status FINAL 04/19/2017    Urine Culture Aerobic Bacterial   Result Value Ref Range    Specimen Description Midstream Urine     Culture Micro (A)      10,000 to 50,000 colonies/mL Beta hemolytic Streptococcus group B Group B   Streptococci are susceptible to ampicillin, penicillin, and cefazolin, but may   be erythromycin and/or clindamycin resistant. Contact Microbiology if your   patient is allergic to penicillin and you need erythromycin and/or clindamycin   testing to be performed. Clindamycin and Erythromycin are  not routinely   prescribed for isolates from the urinary tract. Susceptibility testing must be   requested within 5 days. Group B Streptococcus may be significant in OB patients,   however, it is part of the normal urogenital deidra.  10,000 to 50,000 colonies/mL mixed urogenital deidra Susceptibility testing not   routinely done      Micro Report Status FINAL 04/21/2017    Rapid strep screen   Result Value Ref Range    Specimen Description Throat     Rapid Strep A Screen       NEGATIVE: No Group A streptococcal antigen detected by immunoassay, await   culture report.      Micro Report Status FINAL 04/19/2017    NEISSERIA GONORRHOEA PCR   Result Value Ref Range    Specimen Descrip Vagina     N Gonorrhea PCR  NEG     Negative   Negative for N. gonorrhoeae rRNA by transcription mediated amplification.   A negative result by transcription mediated amplification does not preclude the   presence of N. gonorrhoeae infection because results are dependent on proper   and adequate collection, absence of inhibitors, and sufficient rRNA to be   detected.     CHLAMYDIA TRACHOMATIS PCR   Result Value Ref Range    Specimen Description Vagina     Chlamydia Trachomatis PCR  NEG     Negative   Negative for C. trachomatis rRNA by transcription mediated amplification.   A negative result by transcription mediated amplification does not preclude the   presence of C. trachomatis infection because results are dependent on proper   and adequate collection, absence of inhibitors, and sufficient rRNA to be   detected.     Beta strep group A culture   Result Value Ref Range    Specimen Description Throat     Culture Micro No Beta Streptococcus isolated     Micro Report Status FINAL 04/21/2017

## 2017-04-19 NOTE — MR AVS SNAPSHOT
"              After Visit Summary   2017    Gogo Bourgeois    MRN: [id number]           Patient Information     Date Of Birth          1980        Visit Information        Provider Department      2017 7:25 PM Emily Blanchard MD [facility]        Today's Diagnoses     Candidal vaginitis    -  1    Dysuria        Seasonal allergic rhinitis, unspecified allergic rhinitis trigger        Throat pain           Follow-ups after your visit        Who to contact     If you have questions or need follow up information about today's clinic visit or your schedule please contact [facility] directly at [phone].  Normal or non-critical lab and imaging results will be communicated to you by MyChart, letter or phone within 4 business days after the clinic has received the results. If you do not hear from us within 7 days, please contact the clinic through Servato Corphart or phone. If you have a critical or abnormal lab result, we will notify you by phone as soon as possible.  Submit refill requests through Slingr or call your pharmacy and they will forward the refill request to us. Please allow 3 business days for your refill to be completed.          Additional Information About Your Visit        MyChart Information     Slingr lets you send messages to your doctor, view your test results, renew your prescriptions, schedule appointments and more. To sign up, go to [email or web address] . Click on \"Log in\" on the left side of the screen, which will take you to the Welcome page. Then click on \"Sign up Now\" on the right side of the page.     You will be asked to enter the access code listed below, as well as some personal information. Please follow the directions to create your username and password.     Your access code is: [email or web address]  Expires: 2017 10:20 AM     Your access code will  in 90 days. If you need help or a new code, please call your [facility] or " [phone].        Care EveryWhere ID     This is your Care EveryWhere ID. This could be used by other organizations to access your [facility] medical records  [id number]        Your Vitals Were     Pulse Temperature BMI (Body Mass Index)             92 99.2  F (37.3  C) (Oral) 26.04 kg/m2          Blood Pressure from Last 3 Encounters:   04/19/17 110/76   03/10/17 90/60   02/09/17 112/72    Weight from Last 3 Encounters:   04/19/17 137 lb 12.8 oz (62.5 kg)   02/09/17 136 lb 6.4 oz (61.9 kg)   12/26/16 138 lb (62.6 kg)              We Performed the Following     *UA reflex to Microscopic and Culture ([place] [facility] (except [place] and [place])     Beta strep group A culture     CHLAMYDIA TRACHOMATIS PCR     NEISSERIA GONORRHOEA PCR     Rapid strep screen     Urine Culture Aerobic Bacterial     Urine Microscopic     Wet prep          Today's Medication Changes          These changes are accurate as of: 4/19/17 11:30 PM.  If you have any questions, ask your nurse or doctor.               Start taking these medicines.        Dose/Directions    fluconazole 150 MG tablet   Commonly known as:  DIFLUCAN   Used for:  Candidal vaginitis        Dose:  150 mg   Take 1 tablet (150 mg) by mouth once for 1 dose Repeat in 1 week if no relief   Quantity:  2 tablet   Refills:  0         These medicines have changed or have updated prescriptions.        Dose/Directions    * FLONASE ALLERGY RELIEF NA   This may have changed:  Another medication with the same name was added. Make sure you understand how and when to take each.        Refills:  0       * fluticasone 50 MCG/ACT spray   Commonly known as:  FLONASE   This may have changed:  You were already taking a medication with the same name, and this prescription was added. Make sure you understand how and when to take each.   Used for:  Seasonal allergic rhinitis, unspecified allergic rhinitis trigger        Dose:  1-2 spray   Spray 1-2 sprays into both nostrils  daily   Quantity:  3 Bottle   Refills:  11       * Notice:  This list has 2 medication(s) that are the same as other medications prescribed for you. Read the directions carefully, and ask your doctor or other care provider to review them with you.         Where to get your medicines      These medications were sent to [facility] [address] [place], [place] - [address] [address]     Phone:  [phone]     fluconazole 150 MG tablet    fluticasone 50 MCG/ACT spray                Primary Care Provider Office Phone # Fax #    Paul Weber PA-C [phone]       [facility] [address] [facility] [address]        Thank you!     Thank you for choosing [facility]  for your care. Our goal is always to provide you with excellent care. Hearing back from our patients is one way we can continue to improve our services. Please take a few minutes to complete the written survey that you may receive in the mail after your visit with us. Thank you!             Your Updated Medication List - Protect others around you: Learn how to safely use, store and throw away your medicines at www.disposemymeds.org.          This list is accurate as of: 4/19/17 11:30 PM.  Always use your most recent med list.                   Brand Name Dispense Instructions for use    * FLONASE ALLERGY RELIEF NA          * fluticasone 50 MCG/ACT spray    FLONASE    3 Bottle    Spray 1-2 sprays into both nostrils daily       fluconazole 150 MG tablet    DIFLUCAN    2 tablet    Take 1 tablet (150 mg) by mouth once for 1 dose Repeat in 1 week if no relief       MULTI-VITAMIN PO      Reported on 3/10/2017       nitrofurantoin 50 MG capsule    MACRODANTIN    90 capsule    Take 1 capsule (50 mg) by mouth daily as needed Take one pill before or after sex       SOLUBLE FIBER/PROBIOTICS PO          * Notice:  This list has 2 medication(s) that are the same as other medications  prescribed for you. Read the directions carefully, and ask your doctor or other care provider to review them with you.

## 2017-04-20 NOTE — PROGRESS NOTES
SUBJECTIVE:                                                    Gogo Bourgeois is a 36 year old female who presents to clinic today for the following health issues:      URINARY TRACT SYMPTOMS      Duration: X 1 week    Description  Odor, frequency    Intensity:  moderate    Accompanying signs and symptoms:  Fever/chills: YES  Flank pain no   Nausea and vomiting: no   Vaginal symptoms: discharge and itching  Abdominal/Pelvic Pain: no     History  History of frequent UTI's: YES  History of kidney stones: no   Sexually Active: YES  Possibility of pregnancy: No    Precipitating or alleviating factors: None    Therapies tried and outcome: none     Also c/o allergies, a lot of coughing     MULTIPLE CONCERNS    1. Vaginitis symptoms - burning itching   Lots of discharge a week and half now  Tried monistat left over at home didn't help  Denies any possibility of pregnancy declined a pregnancy test    2. Cough for 2 days worse at night.  No fevers no chills but feels very phlegmy   Older daughter positive for influenza B  Was exposed to flu.     Patient also requesting for gc / ct testing because of recurrent vaginitis symptoms     Problem list and histories reviewed & adjusted, as indicated.  Additional history: as documented    Problem list, Medication list, Allergies, and Medical/Social/Surgical histories reviewed in [facility] and updated as appropriate.    ROS:  Constitutional, HEENT, cardiovascular, pulmonary, gi and gu systems are negative, except as otherwise noted.    OBJECTIVE:                                                    /76  Pulse 92  Temp 99.2  F (37.3  C) (Oral)  Wt 137 lb 12.8 oz (62.5 kg)  BMI 26.04 kg/m2  Body mass index is 26.04 kg/(m^2).  GENERAL: healthy, alert and no distress  EYES: Eyes grossly normal to inspection, PERRL and conjunctivae and sclerae normal  HENT: ear canals and TM's normal, nose and mouth without ulcers or lesions  NECK: no adenopathy, no asymmetry, masses, or scars and  thyroid normal to palpation  RESP: lungs clear to auscultation - no rales, rhonchi or wheezes  BREAST: normal without masses, tenderness or nipple discharge and no palpable axillary masses or adenopathy  CV: regular rate and rhythm, normal S1 S2, no S3 or S4, no murmur, click or rub, no peripheral edema and peripheral pulses strong  ABDOMEN: soft, nontender, no hepatosplenomegaly, no masses and bowel sounds normal  MS: no gross musculoskeletal defects noted, no edema  SKIN: no suspicious lesions or rashes  NEURO: Normal strength and tone, mentation intact and speech normal  PSYCH: mentation appears normal, affect normal/bright    Diagnostic Test Results:  Results for orders placed or performed in visit on 04/19/17 (from the past 24 hour(s))   *UA reflex to Microscopic and Culture ([place] and [facility] (except [place] and [place])   Result Value Ref Range    Color Urine Yellow     Appearance Urine Clear     Glucose Urine Negative NEG mg/dL    Bilirubin Urine Negative NEG    Ketones Urine Negative NEG mg/dL    Specific Gravity Urine 1.010 1.003 - 1.035    Blood Urine Moderate (A) NEG    pH Urine 7.0 5.0 - 7.0 pH    Protein Albumin Urine Negative NEG mg/dL    Urobilinogen Urine 0.2 0.2 - 1.0 EU/dL    Nitrite Urine Negative NEG    Leukocyte Esterase Urine Small (A) NEG    Source Midstream Urine    Urine Microscopic   Result Value Ref Range    WBC Urine 2-5 (A) 0 - 2 /HPF    RBC Urine O - 2 0 - 2 /HPF    Squamous Epithelial /LPF Urine Few FEW /LPF    Bacteria Urine Few (A) NEG /HPF   Wet prep   Result Value Ref Range    Specimen Description Vagina     Wet Prep (A)      Yeast seen  No Trichomonas seen  No clue cells seen      Micro Report Status FINAL 04/19/2017    Rapid strep screen   Result Value Ref Range    Specimen Description Throat     Rapid Strep A Screen       NEGATIVE: No Group A streptococcal antigen detected by immunoassay, await   culture report.      Micro Report Status FINAL 04/19/2017          ASSESSMENT/PLAN:                                                        ICD-10-CM    1. Candidal vaginitis B37.3 fluconazole (DIFLUCAN) 150 MG tablet   2. Dysuria R30.0 *UA reflex to Microscopic and Culture ([place] and [facility] (except [place] and [place])     Wet prep     Urine Microscopic     Urine Culture Aerobic Bacterial     NEISSERIA GONORRHOEA PCR     CHLAMYDIA TRACHOMATIS PCR   3. Seasonal allergic rhinitis, unspecified allergic rhinitis trigger J30.2 fluticasone (FLONASE) 50 MCG/ACT spray   4. Throat pain R07.0 Rapid strep screen     Beta strep group A culture       Patient requested prescription for fluconazole as she already tried monistat. Aware that Fluconazole does have side effects that were discussed, aware of concerns for liver function and bone marrow suppression. Aware contraindicated if pregnant or breastfeeding. Advised to try monistat first then if not improved may use one dose of Fluconazole. But if persist, recommend being seen.     Dysuria likely from vagitis. Will send urine culture if positive will treat    Continue flonase for allergies    Strep negative. Likely viral illness. Possible flu. Influenza has been going around as well and symptoms may be secondary to influenza however patient already past 2 days of symptoms and treatment for influenza at this point is largely supportive. Offered testing for confirmation patient declined    Recommend follow up with primary care provider if no relief, sooner if worse  Adverse reactions of medications discussed.  Over the counter medications discussed.   Aware to come back in if with worsening symptoms or if no relief despite treatment plan  Patient voiced understanding and had no further questions.     MD Emily Urban MD  [facility]

## 2017-04-20 NOTE — NURSING NOTE
"No chief complaint on file.      Initial /76  Pulse 92  Temp 99.2  F (37.3  C) (Oral)  Wt 137 lb 12.8 oz (62.5 kg)  BMI 26.04 kg/m2 Estimated body mass index is 26.04 kg/(m^2) as calculated from the following:    Height as of 2/9/17: 5' 1\" (1.549 m).    Weight as of this encounter: 137 lb 12.8 oz (62.5 kg).  Medication Reconciliation: complete   Sophia Pastor CMA        "

## 2017-04-21 LAB
BACTERIA SPEC CULT: ABNORMAL
BACTERIA SPEC CULT: NORMAL
C TRACH DNA SPEC QL NAA+PROBE: NORMAL
MICRO REPORT STATUS: ABNORMAL
MICRO REPORT STATUS: NORMAL
N GONORRHOEA DNA SPEC QL NAA+PROBE: NORMAL
SPECIMEN SOURCE: ABNORMAL
SPECIMEN SOURCE: NORMAL

## 2017-05-10 ENCOUNTER — OFFICE VISIT (OUTPATIENT)
Dept: FAMILY MEDICINE | Facility: CLINIC | Age: 37
End: 2017-05-10
Payer: COMMERCIAL

## 2017-05-10 VITALS
BODY MASS INDEX: 25.7 KG/M2 | TEMPERATURE: 99.5 F | HEART RATE: 70 BPM | DIASTOLIC BLOOD PRESSURE: 70 MMHG | WEIGHT: 136 LBS | SYSTOLIC BLOOD PRESSURE: 108 MMHG | OXYGEN SATURATION: 98 %

## 2017-05-10 DIAGNOSIS — H10.33 ACUTE BACTERIAL CONJUNCTIVITIS OF BOTH EYES: Primary | ICD-10-CM

## 2017-05-10 DIAGNOSIS — J30.2 SEASONAL ALLERGIC RHINITIS, UNSPECIFIED ALLERGIC RHINITIS TRIGGER: ICD-10-CM

## 2017-05-10 DIAGNOSIS — H10.45 CHRONIC ALLERGIC CONJUNCTIVITIS: ICD-10-CM

## 2017-05-10 PROCEDURE — 99213 OFFICE O/P EST LOW 20 MIN: CPT | Performed by: PHYSICIAN ASSISTANT

## 2017-05-10 RX ORDER — POLYMYXIN B SULFATE AND TRIMETHOPRIM 1; 10000 MG/ML; [USP'U]/ML
2 SOLUTION OPHTHALMIC 3 TIMES DAILY
Qty: 3 ML | Refills: 0 | Status: SHIPPED | OUTPATIENT
Start: 2017-05-10 | End: 2017-05-17

## 2017-05-10 RX ORDER — OLOPATADINE HYDROCHLORIDE 1 MG/ML
1 SOLUTION/ DROPS OPHTHALMIC 2 TIMES DAILY
Qty: 5 ML | Refills: 3 | Status: SHIPPED | OUTPATIENT
Start: 2017-05-10 | End: 2017-11-20

## 2017-05-10 NOTE — MR AVS SNAPSHOT
After Visit Summary   5/10/2017    Gogo Bourgeois    MRN: [id number]           Patient Information     Date Of Birth          1980        Visit Information        Provider Department      5/10/2017 12:50 PM Kehr, Kristen M, PA-C [facility]        Today's Diagnoses     Acute bacterial conjunctivitis of both eyes    -  1    Chronic allergic conjunctivitis        Seasonal allergic rhinitis, unspecified allergic rhinitis trigger           Follow-ups after your visit        Additional Services     ALLERGY/ASTHMA ADULT REFERRAL       Your provider has referred you to: [facility]: [facility]  [phone] [email or web address]    Dr. Laura    Please be aware that coverage of these services is subject to the terms and limitations of your health insurance plan.  Call member services at your health plan with any benefit or coverage questions.      Please bring the following with you to your appointment:    (1) Any X-Rays, CTs or MRIs which have been performed.  Contact the facility where they were done to arrange for  prior to your scheduled appointment.    (2) List of current medications  (3) This referral request   (4) Any documents/labs given to you for this referral                  Who to contact     If you have questions or need follow up information about today's clinic visit or your schedule please contact [facility] directly at [phone].  Normal or non-critical lab and imaging results will be communicated to you by MyChart, letter or phone within 4 business days after the clinic has received the results. If you do not hear from us within 7 days, please contact the clinic through MyChart or phone. If you have a critical or abnormal lab result, we will notify you by phone as soon as possible.  Submit refill requests through [employer] or call your pharmacy and they will forward the refill request to us. Please allow 3 business  "days for your refill to be completed.          Additional Information About Your Visit        [facility] Information     "[employer] lets you send messages to your doctor, view your test results, renew your prescriptions, schedule appointments and more. To sign up, go to [email or web address] . Click on \"Log in\" on the left side of the screen, which will take you to the Welcome page. Then click on \"Sign up Now\" on the right side of the page.     You will be asked to enter the access code listed below, as well as some personal information. Please follow the directions to create your username and password.     Your access code is: [email or web address]  Expires: 2017 10:20 AM     Your access code will  in 90 days. If you need help or a new code, please call your [place] clinic or [phone].        Care EveryWhere ID     This is your Care EveryWhere ID. This could be used by other organizations to access your [place] medical records  [id number]        Your Vitals Were     Pulse Temperature Pulse Oximetry BMI (Body Mass Index)          70 99.5  F (37.5  C) (Oral) 98% 25.7 kg/m2         Blood Pressure from Last 3 Encounters:   05/10/17 108/70   17 110/76   03/10/17 90/60    Weight from Last 3 Encounters:   05/10/17 136 lb (61.7 kg)   17 137 lb 12.8 oz (62.5 kg)   17 136 lb 6.4 oz (61.9 kg)              We Performed the Following     ALLERGY/ASTHMA ADULT REFERRAL          Today's Medication Changes          These changes are accurate as of: 5/10/17  1:14 PM.  If you have any questions, ask your nurse or doctor.               Start taking these medicines.        Dose/Directions    olopatadine 0.1 % ophthalmic solution   Commonly known as:  PATANOL   Used for:  Chronic allergic conjunctivitis   Started by:  Kehr, Kristen M, PA-C        Dose:  1 drop   Place 1 drop into both eyes 2 times daily   Quantity:  5 mL   Refills:  3       trimethoprim-polymyxin b ophthalmic solution   Commonly known as:  " POLYTRIM   Used for:  Acute bacterial conjunctivitis of both eyes   Started by:  Kehr, Kristen M, PA-C        Dose:  2 drop   Apply 2 drops to eye 3 times daily for 7 days   Quantity:  3 mL   Refills:  0            Where to get your medicines      These medications [facility] [address]  [address] [address], [address], [facility] [address]     Phone:  [phone]     olopatadine 0.1 % ophthalmic solution    trimethoprim-polymyxin b ophthalmic solution                Primary Care Provider Office Phone # Fax #    Paul Sofia SUSAN Weber [phone]       [facility] [place] [address] [facility] [address]        Thank you!     Thank you for choosing [facility]  for your care. Our goal is always to provide you with excellent care. Hearing back from our patients is one way we can continue to improve our services. Please take a few minutes to complete the written survey that you may receive in the mail after your visit with us. Thank you!             Your Updated Medication List - Protect others around you: Learn how to safely use, store and throw away your medicines at www.disposemymeds.org.          This list is accurate as of: 5/10/17  1:14 PM.  Always use your most recent med list.                   Brand Name Dispense Instructions for use    fluticasone 50 MCG/ACT spray    FLONASE    3 Bottle    Spray 1-2 sprays into both nostrils daily       MULTI-VITAMIN PO      Reported on 3/10/2017       olopatadine 0.1 % ophthalmic solution    PATANOL    5 mL    Place 1 drop into both eyes 2 times daily       SOLUBLE FIBER/PROBIOTICS PO          trimethoprim-polymyxin b ophthalmic solution    POLYTRIM    3 mL    Apply 2 drops to eye 3 times daily for 7 days

## 2017-05-10 NOTE — NURSING NOTE
"Chief Complaint   Patient presents with     Eye Problem     red irrit - watery - itchy - mattered  x 3-4 days - exp to pink eye last week (daughter)       Initial /70  Pulse 70  Temp 99.5  F (37.5  C) (Oral)  Wt 136 lb (61.7 kg)  SpO2 98%  BMI 25.7 kg/m2 Estimated body mass index is 25.7 kg/(m^2) as calculated from the following:    Height as of 2/9/17: 5' 1\" (1.549 m).    Weight as of this encounter: 136 lb (61.7 kg).  Medication Reconciliation: complete  Terri Ball M.A.    "

## 2017-05-10 NOTE — PROGRESS NOTES
SUBJECTIVE:                                                    Gogo Bourgeois is a 37 year old female who presents to clinic today for the following health issues:    Gogo's. Daughter was recently treated for conjunctivitis. She has allergies and using over the counter medications. They are not working well for the allergic conjunctivitis. She is constantly itching. Now she has developed crusting discharge.       Eye(s) Problem      Duration: 3 days     Description:  Location: bilateral  Pain: YES  Redness: YES  Discharge: YES    Accompanying signs and symptoms: itchy     History (Trauma, foreign body exposure,): None    Precipitating or alleviating factors (contact use): None    Therapies tried and outcome: OTC allergy meds         Problem list and histories reviewed & adjusted, as indicated.  Additional history: as documented    Patient Active Problem List   Diagnosis     S/P LEEP of cervix     Traction alopecia     S/P gastric bypass     Dyshidrotic eczema     Past Surgical History:   Procedure Laterality Date     C MAMMOGRAM, W/BILAT. IMPLANTS  2012    in [place]     C REMOVAL GALLBLADDER  12/2001     COSMETIC ABDOMINOPLASTY  2012    plus plast of arms and inner thighs     LAPAROSCOPIC BYPASS GASTRIC  8/23/11    Sleeve done in [facility], CERVICAL  2002     TUBAL LIGATION  2014       Social History   Substance Use Topics     Smoking status: Never Smoker     Smokeless tobacco: Never Used      Comment: Nonsmoking household     Alcohol use No     Family History   Problem Relation Age of Onset     Hypertension Maternal Grandmother      Respiratory Paternal Grandmother      smoker     Alcohol/Drug Paternal Grandfather      alcohlism, cirrhosis of liver         Allergies   Allergen Reactions     Nkda [No Known Drug Allergies]      Seasonal Allergies        Reviewed and updated as needed this visit by clinical staff       Reviewed and updated as needed this visit by Provider         ROS:  Constitutional,  HEENT, cardiovascular, pulmonary, gi and gu systems are negative, except as otherwise noted.    OBJECTIVE:                                                    /70  Pulse 70  Temp 99.5  F (37.5  C) (Oral)  Wt 136 lb (61.7 kg)  SpO2 98%  BMI 25.7 kg/m2  Body mass index is 25.7 kg/(m^2).  GENERAL: healthy, alert and no distress  EYES: bilateral conjunctivitis, no crusting or swelling of the lids.   MS: no gross musculoskeletal defects noted, no edema  SKIN: no suspicious lesions or rashes  PSYCH: mentation appears normal, affect normal/bright    Diagnostic Test Results:  none      ASSESSMENT/PLAN:                                                      1. Acute bacterial conjunctivitis of both eyes  Treat infection with the following drops.   - trimethoprim-polymyxin b (POLYTRIM) ophthalmic solution; Apply 2 drops to eye 3 times daily for 7 days  Dispense: 3 mL; Refill: 0    2. Chronic allergic conjunctivitis  She will start the allergy medication for better control to stop itching.   Allergies are worse every year. She is on OTC antihistamine, flonase and now adding the drops. She continues to struggle daily. She will make an appointment with Dr. Laura to discuss further options for treatment of her allergies.   - olopatadine (PATANOL) 0.1 % ophthalmic solution; Place 1 drop into both eyes 2 times daily  Dispense: 5 mL; Refill: 3    3. Seasonal allergic rhinitis, unspecified allergic rhinitis trigger  See above  - ALLERGY/ASTHMA ADULT REFERRAL      Kristen M. Kehr, PA-C  [facility]

## 2017-06-15 ENCOUNTER — OFFICE VISIT (OUTPATIENT)
Dept: ALLERGY | Facility: CLINIC | Age: 37
End: 2017-06-15
Payer: COMMERCIAL

## 2017-06-15 VITALS
OXYGEN SATURATION: 99 % | HEART RATE: 83 BPM | SYSTOLIC BLOOD PRESSURE: 104 MMHG | BODY MASS INDEX: 26.15 KG/M2 | DIASTOLIC BLOOD PRESSURE: 71 MMHG | WEIGHT: 138.4 LBS

## 2017-06-15 DIAGNOSIS — J30.1 SEASONAL ALLERGIC RHINITIS DUE TO POLLEN: Primary | ICD-10-CM

## 2017-06-15 DIAGNOSIS — Z51.6 NEED FOR DESENSITIZATION TO ALLERGENS: ICD-10-CM

## 2017-06-15 DIAGNOSIS — J30.89 ALLERGIC RHINITIS DUE TO MOLD: ICD-10-CM

## 2017-06-15 DIAGNOSIS — H10.13 ALLERGIC CONJUNCTIVITIS, BILATERAL: ICD-10-CM

## 2017-06-15 DIAGNOSIS — T78.1XXA POLLEN-FOOD ALLERGY SYNDROME, INITIAL ENCOUNTER: ICD-10-CM

## 2017-06-15 PROCEDURE — 36415 COLL VENOUS BLD VENIPUNCTURE: CPT | Performed by: ALLERGY & IMMUNOLOGY

## 2017-06-15 PROCEDURE — 99203 OFFICE O/P NEW LOW 30 MIN: CPT | Mod: 25 | Performed by: ALLERGY & IMMUNOLOGY

## 2017-06-15 PROCEDURE — 86003 ALLG SPEC IGE CRUDE XTRC EA: CPT | Performed by: ALLERGY & IMMUNOLOGY

## 2017-06-15 PROCEDURE — 95004 PERQ TESTS W/ALRGNC XTRCS: CPT | Performed by: ALLERGY & IMMUNOLOGY

## 2017-06-15 RX ORDER — EPINEPHRINE 0.3 MG/.3ML
0.3 INJECTION SUBCUTANEOUS PRN
Qty: 2 ML | Refills: 2 | Status: SHIPPED | OUTPATIENT
Start: 2017-06-15 | End: 2018-07-24

## 2017-06-15 NOTE — PROGRESS NOTES
Gogo Bourgeois was seen in the Allergy Clinic at [facility]. The following are my recommendations regarding her Allergic Rhinitis Due to Pollen, Allergic Rhinitis Due to Mold, Allergic Conjunctivitis and Food Pollen Syndrome    1. Will obtain in vitro IgE testing to weed pollens today  2. Plan to initiate allergen immunotherapy treatment pending lab results - consent form signed in clinic today  3. Epinephrine auto-injector required per injection clinic protocol  4. Continue fluticasone nasal spray, 2 sprays in each nostril daily - appropriate nasal spray technique reviewed  5. Take cetirizine 10mg at least 60 minutes prior to scheduled immunotherapy appointments  6. Continue to avoid almonds as well as raw apples and carrots. May eat cooked apples and carrots.  7. Follow-up in 3 months      Gogo Bourgeois is a 37 year old  or  female being seen today in consultation for allergies. She states that her symptoms began about 2 years ago and are worse in the spring and summer. Gogo feels that this year has been significantly worse. Her symptoms include itchy eyes, nose, throat, and roof of her mouth. She also has sneezing, nasal congestion, and throat clearing. Gogo reports that she has had difficulty sleeping due to her symptoms. She has been taking flonase and feels that it helps somewhat but she continues to have symptoms. She has also tried zyrtec but does not find it to be effective. Gogo has not taken antihistamines in over 3 weeks. Her sleep has been disrupted due to these symptoms. This spring her symptoms seem to be worse than last year. Last fall she had severe nasal congestion and couldn't breathe. She used afrin for a few days and found it was helpful. She is concerned about having these symptoms return.    Gogo also reports having had reactions to almonds, apples, and carrots. Immediately after eating these foods she has developed itching of the roof of her mouth and the  symptoms last about 15 minutes. When eating apples or carrots in cooked preparations she has not had similar symptoms.      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 IMPLANTS  2012    in [place]     C REMOVAL GALLBLADDER  2001     COSMETIC ABDOMINOPLASTY      plus plast of arms and inner thighs     LAPAROSCOPIC BYPASS GASTRIC  11    Sleeve done in [facility], CERVICAL  2002     TUBAL LIGATION         ENVIRONMENTAL HISTORY: [place] The home is heated with a electric furnace. They does have central air conditioning. The patient's bedroom is furnished with carpeting in bedroom.  Pets inside the house include None. There is not history of cockroach or mice infestation. There is/are 0 smokers in the house.  The house does not have a damp basement.     SOCIAL HISTORY:   Gogo is employed as . She has missed 0 days of school/work. She lives with her 2 sons, 3 daughters and boyfriend.  Her boyfriend works as a IT     REVIEW OF SYSTEMS:  General: negative for weight gain. negative for weight loss. positive  for changes in sleep.   Eyes: positive  for itching. negative for  redness. positive  for tearing/watering.  Ears: negative for fullness. negative for hearing loss. negative for dizziness.   Nose: positive  for snoring.negative for changes in smell. negative for drainage.   Throat: negative for hoarseness. negative for sore throat. negative for trouble swallowing.   Lungs: negative for shortness of breath.negative for wheezing. negative for sputum production.   Cardiovascular: negative for chest pain. negative for swelling of ankles. negative for fast or irregular heartbeat.   Gastrointestinal: negative for nausea. negative for heartburn. negative for acid reflux.   Musculoskeletal: negative for joint pain. negative for joint stiffness. negative for joint swelling.   Neurologic: negative for seizures. negative for fainting. negative for weakness.   Psychiatric: negative for changes in mood. negative for anxiety.   Endocrine: negative for cold intolerance. negative for heat intolerance. negative for tremors.   Hematologic: positive  for easy bruising. positive  for easy bleeding.  Integumentary: negative for rash. negative for scaling. negative for nail changes.       Current Outpatient Prescriptions:      fluticasone (FLONASE) 50 MCG/ACT spray, Spray 1-2 sprays into both nostrils daily, Disp: 3 Bottle, Rfl: 11     Multiple Vitamin (MULTI-VITAMIN PO), Reported on 3/10/2017, Disp: , Rfl:      olopatadine (PATANOL) 0.1 % ophthalmic solution, Place 1 drop into both eyes 2 times daily (Patient not taking: Reported on 6/15/2017), Disp: 5 mL, Rfl: 3     Probiotic Product (SOLUBLE FIBER/PROBIOTICS PO), , Disp: , Rfl:   Immunization History   Administered Date(s) Administered     Influenza (H1N1) 01/07/2010     Influenza (IIV3) 01/07/2010, 09/17/2010, 10/19/2011, 01/30/2013     Influenza Vaccine IM 3yrs+ 4 Valent IIV4 02/05/2015     TD (ADULT, 7+) 12/31/2002     TDAP Vaccine (Adacel) 07/14/2006     TDAP Vaccine (Boostrix) 04/17/2014     Allergies   Allergen Reactions     Nkda [No Known Drug  Allergies]      Seasonal Allergies          EXAM:   /71  Pulse 83  Wt 62.8 kg (138 lb 6.4 oz)  SpO2 99%  BMI 26.15 kg/m2  GENERAL APPEARANCE: alert, cooperative and not in distress  SKIN: no rashes, no lesions  HEAD: atraumatic, normocephalic  EYES: lids and lashes normal, conjunctivae and sclerae clear, pupils equal, round, reactive to light, EOM full and intact  ENT: no scars or lesions, nasal exam showed no discharge, swelling or lesions noted, otoscopy showed external auditory canals clear, tympanic membranes normal, tongue midline and normal, soft palate, uvula, and tonsils normal  NECK: no asymmetry, masses, or scars, supple without significant adenopathy  LUNGS: unlabored respirations, no intercostal retractions or accessory muscle use, clear to auscultation without rales or wheezes  HEART: regular rate and rhythm without murmurs and normal S1 and S2  MUSCULOSKELETAL: no musculoskeletal defects are noted  NEURO: no focal deficits noted, mental status intact  PSYCH: does not appear depressed or anxious and short and long term memory appears intact    WORKUP: Skin testing    Skin prick testing was performed to our adult aeroallergen panel. She had positive reactions to maple/box elder, hackberry, hickory, elm, cottonwood, birch, mulberry, willow, sangita, careless, nettle, alternaria, and cladosporium. All others were negative.    ASSESSMENT/PLAN:  Gogo Bourgeois is a 37 year old female here for evaluation of possible allergies. Skin prick testing was positive for sensitization to trees and molds. Testing to weeds was negative and given her symptoms from last fall we discussed pursuing additional in vitro IgE testing. Gogo was counseled regarding management of allergy symptoms including allergen avoidance, medications, and immunotherapy treatment. We discussed the risks, benefits, and anticipated duration of immunotherapy. Gogo would like to proceed with this treatment given that medications have not  been particularly effective. She was also counseled regarding her reactions to foods and that symptoms were due to food-pollen syndrome. She was advised to avoid almonds as well as raw carrots and apples as symptoms may progress to more severe allergic reactions in a small percentage of patients.    1. Will obtain in vitro IgE testing to weed pollens today  2. Plan to initiate allergen immunotherapy treatment pending lab results - consent form signed in clinic today  3. Epinephrine auto-injector required per injection clinic protocol  4. Continue fluticasone nasal spray, 2 sprays in each nostril daily - appropriate nasal spray technique reviewed  5. Take cetirizine 10mg at least 60 minutes prior to scheduled immunotherapy appointments  6. Continue to avoid almonds as well as raw apples and carrots. May eat cooked apples and carrots.  7. Follow-up in 3 months      Zachary Olivas MD  Allergy/Immunology  [place], [place]      Chart documentation done in part with Dragon Voice Recognition Software. Although reviewed after completion, some word and grammatical errors may remain.

## 2017-06-15 NOTE — NURSING NOTE
Writer demonstrated how to use an EpiPen auto-injector.  Patient instructed to form a fist around the auto-injector, remove blue safety release by pulling straight up, then firmly push orange tip against outer thigh so it clicks, holding for 5 seconds.  Patient advised that once used, needle will not be exposed, as orange tip extends.  Patient advised to call 911 or go to emergency department after epi-pen use for further monitoring.           Writer demonstrated how to use an Auvi-Q Epinephrine auto-injector.  Patient instructed to remove cap from device and follow the instructions given by the recorded audio. This includes removing the red safety release by pulling straight out, then firmly pushing the black tip against outer thigh until it clicks, hold for 5 seconds.  Patient advised that once used, needle will not be exposed, as it retracts back into the device.  Patient advised to call 911 or go to emergency department after Auvi-Q use for further monitoring.         Writer demonstrated how to use the AdrenClick epinephrine auto-injector.  Patient was instructed to remove auto-injector from casing.  Patient instructed to form a fist around the auto-injector, remove caps labeled 1 and then 2 (never placing finger/thumb over ), then firmly push red tip against outer thigh, holding approximately 10 seconds.  Patient advised that once used, needle WILL be exposed.  Patient is to properly dispose of needle in sharps container and not regular trash can.  Patient advised to call 911 or go to emergency department after epi-pen use for further monitoring.         Gabriela Stevenson RN

## 2017-06-15 NOTE — PATIENT INSTRUCTIONS
If you have any questions regarding your allergies, asthma, or what we discussed during your visit today please call the allergy clinic or contact us via Isai.      [facility]: [phone]      Follow-up in 3 months      Take zyrtec (cetirizine) 10mg at least 1 hour before you get your shots

## 2017-06-15 NOTE — NURSING NOTE
"Chief Complaint   Patient presents with     Consult     seasonal allergies are geting worse. She feels very itchy in her mouth and sinuses.        Initial /71  Pulse 83  Wt 62.8 kg (138 lb 6.4 oz)  SpO2 99%  BMI 26.15 kg/m2 Estimated body mass index is 26.15 kg/(m^2) as calculated from the following:    Height as of 2/9/17: 1.549 m (5' 1\").    Weight as of this encounter: 62.8 kg (138 lb 6.4 oz).  Medication Reconciliation: complete   Cristal Mattson MA.... 2:40 PM....6/15/2017      "

## 2017-06-15 NOTE — MR AVS SNAPSHOT
"              After Visit Summary   6/15/2017    Gogo Bourgeois    MRN: [id number]           Patient Information     Date Of Birth          1980        Visit Information        Provider Department      6/15/2017 2:40 PM Zachary Olivas MD [facility]        Today's Diagnoses     Seasonal allergic rhinitis due to pollen    -  1    Allergic rhinitis due to mold        Allergic conjunctivitis, bilateral          Care Instructions    If you have any questions regarding your allergies, asthma, or what we discussed during your visit today please call the allergy clinic or contact us via ThinkVidya.      [facility]: [phone]      Follow-up in 3 months      Take zyrtec (cetirizine) 10mg at least 1 hour before you get your shots          Follow-ups after your visit        Who to contact     If you have questions or need follow up information about today's clinic visit or your schedule please contact [facility] directly at [phone].  Normal or non-critical lab and imaging results will be communicated to you by Rock My Worldhart, letter or phone within 4 business days after the clinic has received the results. If you do not hear from us within 7 days, please contact the clinic through [email or web address] or phone. If you have a critical or abnormal lab result, we will notify you by phone as soon as possible.  Submit refill requests through ThinkVidya or call your pharmacy and they will forward the refill request to us. Please allow 3 business days for your refill to be completed.          Additional Information About Your Visit        Rock My Worldhart Information     ThinkVidya lets you send messages to your doctor, view your test results, renew your prescriptions, schedule appointments and more. To sign up, go to [email or web address] . Click on \"Log in\" on the left side of the screen, which will take you to the Welcome page. Then click on \"Sign up Now\" on the right side of the page.     You will be asked to " enter the access code listed below, as well as some personal information. Please follow the directions to create your username and password.     Your access code is: [email or web address]  Expires: 2017  3:38 PM     Your access code will  in 90 days. If you need help or a new code, please call your [place] clinic or [phone].        Care EveryWhere ID     This is your Care EveryWhere ID. This could be used by other organizations to access your [place] medical records  [id number]        Your Vitals Were     Pulse Pulse Oximetry BMI (Body Mass Index)             83 99% 26.15 kg/m2          Blood Pressure from Last 3 Encounters:   06/15/17 104/71   05/10/17 108/70   17 110/76    Weight from Last 3 Encounters:   06/15/17 62.8 kg (138 lb 6.4 oz)   05/10/17 61.7 kg (136 lb)   17 62.5 kg (137 lb 12.8 oz)              We Performed the Following     Allergen English plantain IgE     Allergen giant ragweed IgE     Allergen lamb's quarter IgE     Allergen Mugwort IgE     Allergen ragweed short IgE     Allergen Sagebrush Wormwood IgE     Allergen Sheep Sorrel IgE     Allergen thistle Russian IgE     Allergen, Kochia/Firebush        Primary Care Provider Office Phone # Fax #    Paul Weber PA-C [phone]       [facility] [address] [facility] [address]        Thank you!     Thank you for choosing [facility]  for your care. Our goal is always to provide you with excellent care. Hearing back from our patients is one way we can continue to improve our services. Please take a few minutes to complete the written survey that you may receive in the mail after your visit with us. Thank you!             Your Updated Medication List - Protect others around you: Learn how to safely use, store and throw away your medicines at www.disposemymeds.org.          This list is accurate as of: 6/15/17  3:38 PM.  Always use your most recent med list.                   Brand Name  Dispense Instructions for use    fluticasone 50 MCG/ACT spray    FLONASE    3 Bottle    Spray 1-2 sprays into both nostrils daily       MULTI-VITAMIN PO      Reported on 3/10/2017       olopatadine 0.1 % ophthalmic solution    PATANOL    5 mL    Place 1 drop into both eyes 2 times daily       SOLUBLE FIBER/PROBIOTICS PO

## 2017-06-21 LAB
COMMON RAGWEED IGE QN: NORMAL KU(A)/L
ENGL PLANTAIN IGE QN: 0.27 KU(A)/L
FIREBUSH IGE QN: NORMAL KU(A)/L
GIANT RAGWEED IGE QN: NORMAL KU(A)/L
GOOSEFOOT IGE QN: NORMAL KU(A)/L
MUGWORT IGE QN: NORMAL KU(A)/L
SALTWORT IGE QN: NORMAL KU(A)/L
SHEEP SORREL IGE QN: NORMAL KU(A)/L
WORMWOOD IGE QN: NORMAL KU(A)/L

## 2017-06-22 ENCOUNTER — TELEPHONE (OUTPATIENT)
Dept: ALLERGY | Facility: CLINIC | Age: 37
End: 2017-06-22

## 2017-06-22 NOTE — TELEPHONE ENCOUNTER
RN discussed results with patient. Informed her that Dr. Olivas would order the serums and clinic staff would call her to schedule appts when the serums arrive. Patient verbalized understanding. No further questions or concerns. Closing encounter.      Katelyn Rowley RN

## 2017-06-22 NOTE — TELEPHONE ENCOUNTER
Please call patient to discuss lab results. She had a slightly positive result for allergies to one weed (English Plantain) and the other tests were negative. I will go ahead and put in orders for immunotherapy as we discussed at her visit and she will get a call once the serum is ready and she can schedule appointments.

## 2017-07-06 DIAGNOSIS — J30.89 ALLERGIC RHINITIS DUE TO MOLD: ICD-10-CM

## 2017-07-06 DIAGNOSIS — J30.1 CHRONIC SEASONAL ALLERGIC RHINITIS DUE TO POLLEN: Primary | ICD-10-CM

## 2017-07-06 NOTE — PROGRESS NOTES
ALLERGY SOLUTION NEW REQUEST    Gogo Bourgeois 1980 MRN: [id number]    DATE NEEDED:  ASAP  Vial Color   Content   Top Dose         Vial Size  Green 1:1,000, Blue 1:100, Yellow 1:10 and Red 1:1  Molds    Red 1:1 0.5   5mL  Green 1:1,000, Blue 1:100, Yellow 1:10 and Red 1:1  Trees    Red 1:1 0.5   5mL  Green 1:1,000, Blue 1:100, Yellow 1:10 and Red 1:1  Weeds    Red 1:1 0.5   5mL        Shot Clinic Location:  [facility] to Location: [place]  Special Instructions:  Call patient once serum is available so she may schedule injection appointments      Updated Prescription Needed: No      Requester Signature  Zachary Olivas MD

## 2017-07-12 ENCOUNTER — TELEPHONE (OUTPATIENT)
Dept: ALLERGY | Facility: CLINIC | Age: 37
End: 2017-07-12

## 2017-07-12 DIAGNOSIS — J30.2 SEASONAL ALLERGIC RHINITIS: Primary | ICD-10-CM

## 2017-07-12 PROCEDURE — 95165 ANTIGEN THERAPY SERVICES: CPT | Performed by: ALLERGY & IMMUNOLOGY

## 2017-07-12 NOTE — PROGRESS NOTES
Allergy serums billed at [place].     Vials received below:      Vial Color                                         Content                      Vial Size                   Expiration Date  Green 1:1,000                                   Mold                                      5mL                                     1/11/2018  Blue 1:100                                        Molds                                    5mL                                     7/11/2018  Yellow 1:10                                       Molds                                    5mL                                     7/11/2018  Red 1:1                                             Molds                                    5mL                                     7/11/2018     Green 1:1,000                                   Trees                                     5mL                                     1/11/2018  Blue 1:100                                        Trees                                     5mL                                     7/11/2018  Yellow 1:10                                       Trees                                     5mL                                     7/11/2018  Red 1:1                                             Trees                                     5mL                                     7/11/2018     Green 1:1,000                                   Weeds                                   5mL                                     1/11/2018  Blue 1:100                                        Weeds                                   5mL                                     7/11/2018                    Yellow 1:10                                       Weeds                                   5mL                                     7/11/2018  Red 1:1                                             Weeds                                   5mL                                     7/11/2018    Original Refill encounter  date: 7/6/2017      Signature  Karissa Reynolds RN

## 2017-07-12 NOTE — PROGRESS NOTES
Allergy serums received at [place].     Vials received below:    Vial Color Content                      Vial Size Expiration Date  Green 1:1,000 Mold 5mL  1/11/2018  Blue 1:100 Molds 5mL  7/11/2018  Yellow 1:10 Molds 5mL  7/11/2018  Red 1:1 Molds 5mL  7/11/2018    Green 1:1,000 Trees 5mL  1/11/2018  Blue 1:100 Trees 5mL  7/11/2018  Yellow 1:10 Trees 5mL  7/11/2018  Red 1:1 Trees 5mL  7/11/2018    Green 1:1,000 Weeds 5mL  1/11/2018  Blue 1:100 Weeds 5mL  7/11/2018   Yellow 1:10 Weeds 5mL  7/11/2018  Red 1:1 Weeds 5mL  7/11/2018    Gatito Reynolds RN

## 2017-07-12 NOTE — TELEPHONE ENCOUNTER
Patient contacted to begin immunotherapy injections.  Allergy serums received at [facility]  First appointment scheduled for 7/25/17    Reviewed new patient instructions including:     Prior to visit patient should:   - Bring epinephrine auto-injector to every appointment, this is for patient safety  -  If directed by your provider, take an over the counter anti-histamine at least 60 minutes prior to appointment (allegra, claritin, zyrtec are all options)   - This can be in addition to medications patient is already taking for allergy  symptoms    At time of visit:   -Patient must present epinephrine auto-injector  -Patient must wait 30 minutes post injections, reviewed this is for patient safety. Patient should watch the time/and return to injection room for assessment of sites before leaving.  -Patient may not go to other appointments/activities within the clinic during the 30 minute wait time.     Return number of allergy shot clinic and main line given for any questions or concerns.     Karissa Reynolds RN ....... 7/12/2017 12:22 PM

## 2017-08-01 ENCOUNTER — ALLIED HEALTH/NURSE VISIT (OUTPATIENT)
Dept: ALLERGY | Facility: CLINIC | Age: 37
End: 2017-08-01
Payer: COMMERCIAL

## 2017-08-01 DIAGNOSIS — J30.9 ALLERGIC RHINITIS, UNSPECIFIED: Primary | ICD-10-CM

## 2017-08-01 PROCEDURE — 95117 IMMUNOTHERAPY INJECTIONS: CPT

## 2017-08-01 PROCEDURE — 99207 ZZC NO CHARGE LOS: CPT

## 2017-08-01 NOTE — PATIENT INSTRUCTIONS
You started your allergy injections today. These instructions were reviewed with you today:     Prior to visit patient should:   - Bring epinephrine auto-injector to every appointment, this is for patient safety  - We reviewed how to use your epinephrine auto-injector if needed  - If directed by your provider, take an over the counter anti-histamine at least 60 minutes prior to appointment (allegra, claritin, zyrtec are all options)   - This can be in addition to medications patient is already taking for allergy  symptoms    At time of visit:   -Patient will be asked a series of questions every time, reviewed these questions and implications.   -Patient must present epinephrine auto-injector  -Patient must wait 30 minutes post injections, reviewed this is for patient safety. Patient should watch the time/and return to injection room for assessment of sites before leaving.  -If patient were to experience signs of a systemic reaction (reviewed what these are), patient should present to injection room and notify nursing staff immediately   -Patient may not go to other appointments/activities within the clinic during the 30 minute wait time.     Post Visit:   -Reviewed signs of systemic reaction/anaphylaxis and what to do if these were to occur  -Reviewed signs of a normal local reaction and when to call the clinic  -Reviewed dosing schedule, and assisted with/or encouraged patient to schedule additional appointments  -Avoid avid vigorous activity from right before each injection until 2 hours after the injection     *Patient handout given with this information.   Anaphylaxis Action Plan for Immunotherapy Patients    There is risk of systemic reactions when receiving immunotherapy injections. Local reactions such as a wheal (hive) smaller than a quarter, redness, swelling, and soreness are common. If the wheal is greater than the size of a half dollar (3.4 cm) please contact our clinic (numbers below), as we will need  to adjust the dose that you receive at your next injection. Waiting until the next appointment to inform us of the reaction could increase the wait time that you experience, because your allergist will need to be contacted for new orders prior to giving the injection.  If you have symptoms not localized to the injection site, please follow the anaphylaxis plan, and contact our office to update after you have received emergency medical treatment. Please ask to speak to an Allergy RN with any questions or updates.     [facility]: [phone]  [facility]: [phone]  [facility]: [phone]  [place]: [phone]  [phone]

## 2017-08-01 NOTE — PROGRESS NOTES
Patient started allergy injections today. Reviewed new patient instructions including:     Prior to visit patient should:   - Bring epinephrine auto-injector to every appointment, this is for patient safety  - We reviewed how to use your epinephrine auto-injector if needed   -  If directed by your provider, take an over the counter anti-histamine at least 60 minutes prior to appointment (allegra, claritin, zyrtec are all options)   - This can be in addition to medications patient is already taking for allergy  symptoms    At time of visit:   -Patient will be asked a series of questions every time, reviewed these questions and implications.   -Patient must present epinephrine auto-injector  -Patient must wait 30 minutes post injections, reviewed this is for patient safety. Patient should watch the time/and return to injection room for assessment of sites before leaving.  -If patient were to experience signs of a systemic reaction (reviewed what these are), patient should present to injection room and notify nursing staff immediately   -Patient may not go to other appointments/activities within the clinic during the 30 minute wait time.     Post Visit:   -Reviewed signs of systemic reaction/anaphylaxis and what to do if these were to occur  -Reviewed signs of a normal local reaction and when to call the clinic  -Reviewed dosing schedule, and assisted with/or encouraged patient to schedule additional appointments  -Avoid avid vigorous activity from right before each injection until 2 hours after the injection     *Patient handout given with this information.     Patient presented after waiting 30 minutes with no reaction to allergy injections. Discharged from clinic.  Lori Nguyen RN ............   8/1/2017...2:49 PM

## 2017-08-01 NOTE — MR AVS SNAPSHOT
After Visit Summary   8/1/2017    Gogo Bourgeois    MRN: [id number]           Patient Information     Date Of Birth          1980        Visit Information        Provider Department      8/1/2017 2:15 PM  ALLERGY SHOTS [facility] Instructions    You started your allergy injections today. These instructions were reviewed with you today:     Prior to visit patient should:   - Bring epinephrine auto-injector to every appointment, this is for patient safety  - We reviewed how to use your epinephrine auto-injector if needed  - If directed by your provider, take an over the counter anti-histamine at least 60 minutes prior to appointment (allegra, claritin, zyrtec are all options)   - This can be in addition to medications patient is already taking for allergy  symptoms    At time of visit:   -Patient will be asked a series of questions every time, reviewed these questions and implications.   -Patient must present epinephrine auto-injector  -Patient must wait 30 minutes post injections, reviewed this is for patient safety. Patient should watch the time/and return to injection room for assessment of sites before leaving.  -If patient were to experience signs of a systemic reaction (reviewed what these are), patient should present to injection room and notify nursing staff immediately   -Patient may not go to other appointments/activities within the clinic during the 30 minute wait time.     Post Visit:   -Reviewed signs of systemic reaction/anaphylaxis and what to do if these were to occur  -Reviewed signs of a normal local reaction and when to call the clinic  -Reviewed dosing schedule, and assisted with/or encouraged patient to schedule additional appointments  -Avoid avid vigorous activity from right before each injection until 2 hours after the injection     *Patient handout given with this information.   Anaphylaxis Action Plan for Immunotherapy Patients    There is risk  of systemic reactions when receiving immunotherapy injections. Local reactions such as a wheal (hive) smaller than a quarter, redness, swelling, and soreness are common. If the wheal is greater than the size of a half dollar (3.4 cm) please contact our clinic (numbers below), as we will need to adjust the dose that you receive at your next injection. Waiting until the next appointment to inform us of the reaction could increase the wait time that you experience, because your allergist will need to be contacted for new orders prior to giving the injection.  If you have symptoms not localized to the injection site, please follow the anaphylaxis plan, and contact our office to update after you have received emergency medical treatment. Please ask to speak to an Allergy RN with any questions or updates.     [facility]: [phone]  [facility]: [phone]  [facility]: [phone]  [place]: [phone]  [phone]                Follow-ups after your visit        Your next 10 appointments already scheduled     Aug 01, 2017  2:15 PM CDT   Nurse Only with FZ ALLERGY SHOTS   [facility] [place] [facility] [place]    [address]  [place] [address]   [phone]            Aug 08, 2017  3:15 PM CDT   Nurse Only with FZ ALLERGY SHOTS   [facility] [place] ([facility] [place]    [address]  [place] [address]   [phone]            Aug 15, 2017  2:45 PM CDT   Nurse Only with FZ ALLERGY SHOTS   [facility] [place] ([facility] [place]    [address]  [place] [address]   [phone]            Aug 22, 2017  2:30 PM CDT   Nurse Only with FZ ALLERGY SHOTS   [facility] [place] ([facility] [place]    [address]  [place] [address]   [phone]            Aug 29, 2017  2:45 PM CDT   Nurse Only with FZ ALLERGY SHOTS   [facility] [place] ([facility] [place])    [address] [place] [address]  "[place] [address] [phone]              Who to contact     If you have questions or need follow up information about today's clinic visit or your schedule please contact [facility] directly at [phone].  Normal or non-critical lab and imaging results will be communicated to you by MyChart, letter or phone within 4 business days after the clinic has received the results. If you do not hear from us within 7 days, please contact the clinic through MyChart or phone. If you have a critical or abnormal lab result, we will notify you by phone as soon as possible.  Submit refill requests through [employer] or call your pharmacy and they will forward the refill request to us. Please allow 3 business days for your refill to be completed.          Additional Information About Your Visit        MyChart Information     S[employer]ira lets you send messages to your doctor, view your test results, renew your prescriptions, schedule appointments and more. To sign up, go to [email or web address] . Click on \"Log in\" on the left side of the screen, which will take you to the Welcome page. Then click on \"Sign up Now\" on the right side of the page.     You will be asked to enter the access code listed below, as well as some personal information. Please follow the directions to create your username and password.     Your access code is: [email or web address]  Expires: 2017  3:38 PM     Your access code will  in 90 days. If you need help or a new code, please call your [facility] or [phone].        Care EveryWhere ID     This is your Care EveryWhere ID. This could be used by other organizations to access your [place] medical records  [id number]         Blood Pressure from Last 3 Encounters:   06/15/17 104/71   05/10/17 108/70   17 110/76    Weight from Last 3 Encounters:   06/15/17 62.8 kg (138 lb 6.4 oz)   05/10/17 61.7 kg (136 lb)   17 62.5 kg (137 lb 12.8 oz)              Today, you " had the following     No orders found for display       Primary Care Provider Office Phone # Fax #    Paul Weber PA-C [phone]       [facility] [place] [address] [facility] [address]        Equal Access to Services     [facility] DANELLEFRANCISCO : Hadii aad ku hadasho Soomaali, waaxda luqadaha, qaybta kaalmada adeegyada, waxay idiin hayaan adeeg kharash la'aan . So [facility] [phone].    ATENCIÓN: Si habla español, tiene a arteaga disposición servicios gratuitos de asistencia lingüística. Llame al [phone].    We comply with applicable federal civil rights laws and [place] laws. We do not discriminate on the basis of race, color, national origin, age, disability sex, sexual orientation or gender identity.            Thank you!     Thank you for choosing [facility]  for your care. Our goal is always to provide you with excellent care. Hearing back from our patients is one way we can continue to improve our services. Please take a few minutes to complete the written survey that you may receive in the mail after your visit with us. Thank you!             Your Updated Medication List - Protect others around you: Learn how to safely use, store and throw away your medicines at www.disposemymeds.org.          This list is accurate as of: 8/1/17  1:55 PM.  Always use your most recent med list.                   Brand Name Dispense Instructions for use Diagnosis    * ALLERGEN IMMUNOTHERAPY PRESCRIPTION     5 mL    Name of Mix: Mix #1  Mold Alternaria Tenuis GLY 1:10 w/v, HS  0.5 ml Hormodendrum Cladosporioides 1:10 w/v, HS 0.5 ml Diluent: HSA qs to 5ml    Chronic seasonal allergic rhinitis due to pollen, Allergic rhinitis due to mold       * ALLERGEN IMMUNOTHERAPY PRESCRIPTION     5 mL    Name of Mix: Mix #2  Tree  Julio Cesar, White  GLY 1:20 w/v, HS  0.5 ml Birch Mix GLY 1:20 w/v, HS  0.5 ml Boxelder-Maple  Mix BHR (Boxelder Hard Red) 1:20 w/v, HS  0.5 ml [place], Common GLY 1:20 w/v, HS  0.5 ml  Elm, American GLY 1:20 w/v, HS  0.5 ml Hackberry GLY 1:20 w/v, HS 0.5 ml Hickory, Shagbark GLY 1:20 w/v, HS  0.5 ml Melrose Mix GLY 1:20 w/v, HS 0.5 ml [place], Black GLY 1:20 w/v, HS 0.5 ml Diluent: HSA qs to 5ml    Chronic seasonal allergic rhinitis due to pollen, Allergic rhinitis due to mold       * ALLERGEN IMMUNOTHERAPY PRESCRIPTION     5 mL    Name of Mix: Mix #3  Weeds Nettle GLY 1:20 w/v, HS 0.5 ml Pigweed, Careless GLY 1:20 w/v, HS 0.5 ml Plantain, English GLY 1:20 w/v, HS 0.5 ml Diluent: HSA qs to 5ml    Chronic seasonal allergic rhinitis due to pollen, Allergic rhinitis due to mold       EPINEPHrine 0.3 MG/0.3ML injection 2-pack    AUVI-Q    2 mL    Inject 0.3 mLs (0.3 mg) into the muscle as needed for anaphylaxis    Need for desensitization to allergens       fluticasone 50 MCG/ACT spray    FLONASE    3 Bottle    Spray 1-2 sprays into both nostrils daily    Seasonal allergic rhinitis, unspecified allergic rhinitis trigger       MULTI-VITAMIN PO      Reported on 3/10/2017        olopatadine 0.1 % ophthalmic solution    PATANOL    5 mL    Place 1 drop into both eyes 2 times daily    Chronic allergic conjunctivitis       SOLUBLE FIBER/PROBIOTICS PO           * Notice:  This list has 3 medication(s) that are the same as other medications prescribed for you. Read the directions carefully, and ask your doctor or other care provider to review them with you.

## 2017-08-09 ENCOUNTER — ALLIED HEALTH/NURSE VISIT (OUTPATIENT)
Dept: ALLERGY | Facility: CLINIC | Age: 37
End: 2017-08-09
Payer: COMMERCIAL

## 2017-08-09 DIAGNOSIS — J30.9 ALLERGIC RHINITIS, UNSPECIFIED: Primary | ICD-10-CM

## 2017-08-09 PROCEDURE — 99207 ZZC NO CHARGE LOS: CPT

## 2017-08-09 PROCEDURE — 95117 IMMUNOTHERAPY INJECTIONS: CPT

## 2017-08-09 NOTE — MR AVS SNAPSHOT
After Visit Summary   8/9/2017    Gogo Bourgeois    MRN: [id number]           Patient Information     Date Of Birth          1980        Visit Information        Provider Department      8/9/2017 11:45 AM FZ ALLERGY SHOTS [facility]        Today's Diagnoses     Allergic rhinitis, unspecified    -  1       Follow-ups after your visit        Your next 10 appointments already scheduled     Aug 15, 2017  2:45 PM CDT   Nurse Only with FZ ALLERGY SHOTS   [facility]    [address] [address] [phone]            Aug 22, 2017  2:30 PM CDT   Nurse Only with FZ ALLERGY SHOTS   [facility]    [address] [address] [phone]            Aug 29, 2017  2:45 PM CDT   Nurse Only with FZ ALLERGY SHOTS   [facility]    [address] [address] [phone]              Who to contact     If you have questions or need follow up information about today's clinic visit or your schedule please contact [facility] directly at [phone].  Normal or non-critical lab and imaging results will be communicated to you by [employer], letter or phone within 4 business days after the clinic has received the results. If you do not hear from us within 7 days, please contact the clinic through [employer] or phone. If you have a critical or abnormal lab result, we will notify you by phone as soon as possible.  Submit refill requests through [employer] or call your pharmacy and they will forward the refill request to us. Please allow 3 business days for your refill to be completed.          Additional Information About Your Visit        [employer] Information     [employer] lets you send messages to your doctor, view your test results, renew your prescriptions, schedule appointments and more. To sign up, go to  [email or web address] . Click on \"Log in\" on the left side of the screen, which will take you to the Welcome page. Then click on \"Sign up Now\" on the right side of the page.     You will be asked to enter the access code listed below, as well as some personal information. Please follow the directions to create your username and password.     Your access code is: [email or web address]  Expires: 2017  3:38 PM     Your access code will  in 90 days. If you need help or a new code, please call your [place] clinic or [phone].        Care EveryWhere ID     This is your Care EveryWhere ID. This could be used by other organizations to access your [place] medical records  [id number]         Blood Pressure from Last 3 Encounters:   06/15/17 104/71   05/10/17 108/70   17 110/76    Weight from Last 3 Encounters:   06/15/17 62.8 kg (138 lb 6.4 oz)   05/10/17 61.7 kg (136 lb)   17 62.5 kg (137 lb 12.8 oz)              We Performed the Following     Allergy Shot: Two or more injections        Primary Care Provider Office Phone # Fax #    Paul Weber PA-C [phone]       [address]  [place] [address]        Equal Access to Services     [facility]: Hadii aad ku hadasho Soomaali, waaxda luqadaha, qaybta kaalmada adeegyada, waxay mora haychente negron . So [facility] [phone].    ATENCIÓN: Si habla español, tiene a arteaga disposición servicios gratuitos de asistencia lingüística. Luciana al [phone].    We comply with applicable federal civil rights laws and [place] laws. We do not discriminate on the basis of race, color, national origin, age, disability sex, sexual orientation or gender identity.            Thank you!     Thank you for choosing [facility]  for your care. Our goal is always to provide you with excellent care. Hearing back from our patients is one way we can continue to improve our services. Please take a few minutes to complete the " written survey that you may receive in the mail after your visit with us. Thank you!             Your Updated Medication List - Protect others around you: Learn how to safely use, store and throw away your medicines at www.disposemymeds.org.          This list is accurate as of: 8/9/17 12:28 PM.  Always use your most recent med list.                   Brand Name Dispense Instructions for use Diagnosis    * ALLERGEN IMMUNOTHERAPY PRESCRIPTION     5 mL    Name of Mix: Mix #1  Mold Alternaria Tenuis GLY 1:10 w/v, HS  0.5 ml Hormodendrum Cladosporioides 1:10 w/v, HS 0.5 ml Diluent: HSA qs to 5ml    Chronic seasonal allergic rhinitis due to pollen, Allergic rhinitis due to mold       * ALLERGEN IMMUNOTHERAPY PRESCRIPTION     5 mL    Name of Mix: Mix #2  Tree  Julio Cesar, White  GLY 1:20 w/v, HS  0.5 ml Birch Mix GLY 1:20 w/v, HS  0.5 ml Boxelder-Maple  Mix BHR (Boxelder Hard Red) 1:20 w/v, HS  0.5 ml [place], Common GLY 1:20 w/v, HS  0.5 ml Elm, American GLY 1:20 w/v, HS  0.5 ml Hackberry GLY 1:20 w/v, HS 0.5 ml Hickory, Shagbark GLY 1:20 w/v, HS  0.5 ml [place] Mix GLY 1:20 w/v, HS 0.5 ml [place], Black GLY 1:20 w/v, HS 0.5 ml Diluent: HSA qs to 5ml    Chronic seasonal allergic rhinitis due to pollen, Allergic rhinitis due to mold       * ALLERGEN IMMUNOTHERAPY PRESCRIPTION     5 mL    Name of Mix: Mix #3  Weeds Nettle GLY 1:20 w/v, HS 0.5 ml Pigweed, Careless GLY 1:20 w/v, HS 0.5 ml Plantain, English GLY 1:20 w/v, HS 0.5 ml Diluent: HSA qs to 5ml    Chronic seasonal allergic rhinitis due to pollen, Allergic rhinitis due to mold       EPINEPHrine 0.3 MG/0.3ML injection 2-pack    AUVI-Q    2 mL    Inject 0.3 mLs (0.3 mg) into the muscle as needed for anaphylaxis    Need for desensitization to allergens       fluticasone 50 MCG/ACT spray    FLONASE    3 Bottle    Spray 1-2 sprays into both nostrils daily    Seasonal allergic rhinitis, unspecified allergic rhinitis trigger       MULTI-VITAMIN PO      Reported on 3/10/2017         olopatadine 0.1 % ophthalmic solution    PATANOL    5 mL    Place 1 drop into both eyes 2 times daily    Chronic allergic conjunctivitis       SOLUBLE FIBER/PROBIOTICS PO           * Notice:  This list has 3 medication(s) that are the same as other medications prescribed for you. Read the directions carefully, and ask your doctor or other care provider to review them with you.

## 2017-08-09 NOTE — PROGRESS NOTES
Patient presented after waiting 30 minutes with no reaction to allergy injections. Discharged from clinic.    Karissa Reynolds RN ....... 8/9/2017 12:27 PM

## 2017-08-15 ENCOUNTER — ALLIED HEALTH/NURSE VISIT (OUTPATIENT)
Dept: ALLERGY | Facility: CLINIC | Age: 37
End: 2017-08-15
Payer: COMMERCIAL

## 2017-08-15 DIAGNOSIS — J30.9 ALLERGIC RHINITIS, UNSPECIFIED: Primary | ICD-10-CM

## 2017-08-15 PROCEDURE — 99207 ZZC NO CHARGE LOS: CPT

## 2017-08-15 PROCEDURE — 95117 IMMUNOTHERAPY INJECTIONS: CPT

## 2017-08-15 NOTE — MR AVS SNAPSHOT
"              After Visit Summary   8/15/2017    Gogo Bourgeois    MRN: [id number]           Patient Information     Date Of Birth          1980        Visit Information        Provider Department      8/15/2017 2:45 PM FZ ALLERGY SHOTS [facility]        Today's Diagnoses     Allergic rhinitis, unspecified    -  1       Follow-ups after your visit        Your next 10 appointments already scheduled     Aug 22, 2017  2:30 PM CDT   Nurse Only with FZ ALLERGY SHOTS   [facility] ([facility]    [address] [address] [phone]            Aug 29, 2017  2:45 PM CDT   Nurse Only with FZ ALLERGY SHOTS   [facility] ([facility]    [address] [address] [phone]              Who to contact     If you have questions or need follow up information about today's clinic visit or your schedule please contact [facility] directly at [phone].  Normal or non-critical lab and imaging results will be communicated to you by [employer], letter or phone within 4 business days after the clinic has received the results. If you do not hear from us within 7 days, please contact the clinic through [employer] or phone. If you have a critical or abnormal lab result, we will notify you by phone as soon as possible.  Submit refill requests through [employer] or call your pharmacy and they will forward the refill request to us. Please allow 3 business days for your refill to be completed.          Additional Information About Your Visit        [employer] Information     [employer] lets you send messages to your doctor, view your test results, renew your prescriptions, schedule appointments and more. To sign up, go to [email or web address] . Click on \"Log in\" on the left side of the screen, which will take you to the Welcome page. Then click on \"Sign up Now\" on the right side of the page.     You will be asked to " enter the access code listed below, as well as some personal information. Please follow the directions to create your username and password.     Your access code is: [email or web address]  Expires: 2017  3:38 PM     Your access code will  in 90 days. If you need help or a new code, please call your [place] clinic or [phone].        Care EveryWhere ID     This is your Care EveryWhere ID. This could be used by other organizations to access your [place] medical records  [id number]         Blood Pressure from Last 3 Encounters:   06/15/17 104/71   05/10/17 108/70   17 110/76    Weight from Last 3 Encounters:   06/15/17 62.8 kg (138 lb 6.4 oz)   05/10/17 61.7 kg (136 lb)   17 62.5 kg (137 lb 12.8 oz)              We Performed the Following     Allergy Shot: Two or more injections        Primary Care Provider Office Phone # Fax #    Paul Weber PA-C [phone]       [address] [facility] [address]        Equal Access to Services     [facility]: Hadii aad ku hadasho Soomaali, waaxda luqadaha, qaybta kaalmada adeegyada, waxay idiin hayaan micaela negron . So [facility] [phone].    ATENCIÓN: Si habla español, tiene a arteaga disposición servicios gratuitos de asistencia lingüística. Aprylame al [phone].    We comply with applicable federal civil rights laws and [place] laws. We do not discriminate on the basis of race, color, national origin, age, disability sex, sexual orientation or gender identity.            Thank you!     Thank you for choosing [facility] [place]  for your care. Our goal is always to provide you with excellent care. Hearing back from our patients is one way we can continue to improve our services. Please take a few minutes to complete the written survey that you may receive in the mail after your visit with us. Thank you!             Your Updated Medication List - Protect others around you: Learn how to safely use, store and throw away your  medicines at www.disposemymeds.org.          This list is accurate as of: 8/15/17  3:36 PM.  Always use your most recent med list.                   Brand Name Dispense Instructions for use Diagnosis    * ALLERGEN IMMUNOTHERAPY PRESCRIPTION     5 mL    Name of Mix: Mix #1  Mold Alternaria Tenuis GLY 1:10 w/v, HS  0.5 ml Hormodendrum Cladosporioides 1:10 w/v, HS 0.5 ml Diluent: HSA qs to 5ml    Chronic seasonal allergic rhinitis due to pollen, Allergic rhinitis due to mold       * ALLERGEN IMMUNOTHERAPY PRESCRIPTION     5 mL    Name of Mix: Mix #2  Tree  Julio Cesar, White  GLY 1:20 w/v, HS  0.5 ml Birch Mix GLY 1:20 w/v, HS  0.5 ml Boxelder-Maple  Mix BHR (Boxelder Hard Red) 1:20 w/v, HS  0.5 ml [place], Common GLY 1:20 w/v, HS  0.5 ml Elm, American GLY 1:20 w/v, HS  0.5 ml Hackberry GLY 1:20 w/v, HS 0.5 ml Hickory, Shagbark GLY 1:20 w/v, HS  0.5 ml [place] Mix GLY 1:20 w/v, HS 0.5 ml [place], Black GLY 1:20 w/v, HS 0.5 ml Diluent: HSA qs to 5ml    Chronic seasonal allergic rhinitis due to pollen, Allergic rhinitis due to mold       * ALLERGEN IMMUNOTHERAPY PRESCRIPTION     5 mL    Name of Mix: Mix #3  Weeds Nettle GLY 1:20 w/v, HS 0.5 ml Pigweed, Careless GLY 1:20 w/v, HS 0.5 ml Plantain, English GLY 1:20 w/v, HS 0.5 ml Diluent: HSA qs to 5ml    Chronic seasonal allergic rhinitis due to pollen, Allergic rhinitis due to mold       EPINEPHrine 0.3 MG/0.3ML injection 2-pack    AUVI-Q    2 mL    Inject 0.3 mLs (0.3 mg) into the muscle as needed for anaphylaxis    Need for desensitization to allergens       fluticasone 50 MCG/ACT spray    FLONASE    3 Bottle    Spray 1-2 sprays into both nostrils daily    Seasonal allergic rhinitis, unspecified allergic rhinitis trigger       MULTI-VITAMIN PO      Reported on 3/10/2017        olopatadine 0.1 % ophthalmic solution    PATANOL    5 mL    Place 1 drop into both eyes 2 times daily    Chronic allergic conjunctivitis       SOLUBLE FIBER/PROBIOTICS PO           * Notice:  This list  has 3 medication(s) that are the same as other medications prescribed for you. Read the directions carefully, and ask your doctor or other care provider to review them with you.

## 2017-08-15 NOTE — PROGRESS NOTES
Patient presented after waiting 30 minutes with no reaction to allergy injections. Discharged from clinic.  Lori Nguyen RN ............   8/15/2017...3:36 PM

## 2017-08-22 ENCOUNTER — ALLIED HEALTH/NURSE VISIT (OUTPATIENT)
Dept: ALLERGY | Facility: CLINIC | Age: 37
End: 2017-08-22
Payer: COMMERCIAL

## 2017-08-22 DIAGNOSIS — J30.9 ALLERGIC RHINITIS, UNSPECIFIED: Primary | ICD-10-CM

## 2017-08-22 PROCEDURE — 95117 IMMUNOTHERAPY INJECTIONS: CPT

## 2017-08-22 NOTE — MR AVS SNAPSHOT
After Visit Summary   8/22/2017    Gogo Bourgeois    MRN: [id number]           Patient Information     Date Of Birth          1980        Visit Information        Provider Department      8/22/2017 2:30 PM FZ ALLERGY SHOTS [facility]        Today's Diagnoses     Allergic rhinitis, unspecified    -  1       Follow-ups after your visit        Your next 10 appointments already scheduled     Aug 29, 2017  2:45 PM CDT   Nurse Only with FZ ALLERGY SHOTS   [facility] ([facility]    [address]  [place] [address]   [phone]            Sep 05, 2017  2:15 PM CDT   Nurse Only with FZ ALLERGY SHOTS   [facility] ([facility])    [address]  [place] [address]   [phone]            Sep 12, 2017  1:00 PM CDT   Nurse Only with FZ ALLERGY SHOTS   [facility] ([facility])    [address]  [place] [address]   [phone]            Sep 26, 2017  1:15 PM CDT   Nurse Only with FZ ALLERGY SHOTS   [facility] ([facility])    [address]  [place] [address]   [phone]              Who to contact     If you have questions or need follow up information about today's clinic visit or your schedule please contact [facility] directly at [phone].  Normal or non-critical lab and imaging results will be communicated to you by MyChart, letter or phone within 4 business days after the clinic has received the results. If you do not hear from us within 7 days, please contact the clinic through [employer] or phone. If you have a critical or abnormal lab result, we will notify you by phone as soon as possible.  Submit refill requests through [employer] or call your pharmacy and they will forward the refill request to us. Please allow 3 business days for your refill to be completed.          Additional Information About Your Visit       "  MyChart Information     [employer] lets you send messages to your doctor, view your test results, renew your prescriptions, schedule appointments and more. To sign up, go to [email or web address] ([employer]) . Click on \"Log in\" on the left side of the screen, which will take you to the Welcome page. Then click on \"Sign up Now\" on the right side of the page.     You will be asked to enter the access code listed below, as well as some personal information. Please follow the directions to create your username and password.     Your access code is: [email or web address]  Expires: 2017  3:38 PM     Your access code will  in 90 days. If you need help or a new code, please call your [place] clinic or [phone].        Care EveryWhere ID     This is your Care EveryWhere ID. This could be used by other organizations to access your [place] medical records  [id number]         Blood Pressure from Last 3 Encounters:   06/15/17 104/71   05/10/17 108/70   17 110/76    Weight from Last 3 Encounters:   06/15/17 62.8 kg (138 lb 6.4 oz)   05/10/17 61.7 kg (136 lb)   17 62.5 kg (137 lb 12.8 oz)              We Performed the Following     Allergy Shot: Two or more injections        Primary Care Provider Office Phone # Fax #    Paul Kimberlyn Weber PA-C [phone]       [address]  [place] [address]        Equal Access to Services     MEGHAN CARR : Hadii aad ku hadasho Soomaali, waaxda luqadaha, qaybta kaalmada adeegyachiqui, rema negron . So [facility] [phone].    ATENCIÓN: Si habla español, tiene a arteaga disposición servicios gratuitos de asistencia lingüística. Llame al [phone].    We comply with applicable federal civil rights laws and [place] laws. We do not discriminate on the basis of race, color, national origin, age, disability sex, sexual orientation or gender identity.            Thank you!     Thank you for choosing [facility] [place]  for your care. Our goal is " always to provide you with excellent care. Hearing back from our patients is one way we can continue to improve our services. Please take a few minutes to complete the written survey that you may receive in the mail after your visit with us. Thank you!             Your Updated Medication List - Protect others around you: Learn how to safely use, store and throw away your medicines at www.disposemymeds.org.          This list is accurate as of: 8/22/17  4:14 PM.  Always use your most recent med list.                   Brand Name Dispense Instructions for use Diagnosis    * ALLERGEN IMMUNOTHERAPY PRESCRIPTION     5 mL    Name of Mix: Mix #1  Mold Alternaria Tenuis GLY 1:10 w/v, HS  0.5 ml Hormodendrum Cladosporioides 1:10 w/v, HS 0.5 ml Diluent: HSA qs to 5ml    Chronic seasonal allergic rhinitis due to pollen, Allergic rhinitis due to mold       * ALLERGEN IMMUNOTHERAPY PRESCRIPTION     5 mL    Name of Mix: Mix #2  Tree  Julio Cesar, White  GLY 1:20 w/v, HS  0.5 ml Birch Mix GLY 1:20 w/v, HS  0.5 ml Boxelder-Maple  Mix BHR (Boxelder Hard Red) 1:20 w/v, HS  0.5 ml [place], Common GLY 1:20 w/v, HS  0.5 ml Elm, American GLY 1:20 w/v, HS  0.5 ml Hackberry GLY 1:20 w/v, HS 0.5 ml Hickory, Shagbark GLY 1:20 w/v, HS  0.5 ml [place] Mix GLY 1:20 w/v, HS 0.5 ml [place], Black GLY 1:20 w/v, HS 0.5 ml Diluent: HSA qs to 5ml    Chronic seasonal allergic rhinitis due to pollen, Allergic rhinitis due to mold       * ALLERGEN IMMUNOTHERAPY PRESCRIPTION     5 mL    Name of Mix: Mix #3  Weeds Nettle GLY 1:20 w/v, HS 0.5 ml Pigweed, Careless GLY 1:20 w/v, HS 0.5 ml Plantain, English GLY 1:20 w/v, HS 0.5 ml Diluent: HSA qs to 5ml    Chronic seasonal allergic rhinitis due to pollen, Allergic rhinitis due to mold       EPINEPHrine 0.3 MG/0.3ML injection 2-pack    AUVI-Q    2 mL    Inject 0.3 mLs (0.3 mg) into the muscle as needed for anaphylaxis    Need for desensitization to allergens       fluticasone 50 MCG/ACT spray    FLONASE    3 Bottle     Spray 1-2 sprays into both nostrils daily    Seasonal allergic rhinitis, unspecified allergic rhinitis trigger       MULTI-VITAMIN PO      Reported on 3/10/2017        olopatadine 0.1 % ophthalmic solution    PATANOL    5 mL    Place 1 drop into both eyes 2 times daily    Chronic allergic conjunctivitis       SOLUBLE FIBER/PROBIOTICS PO           * Notice:  This list has 3 medication(s) that are the same as other medications prescribed for you. Read the directions carefully, and ask your doctor or other care provider to review them with you.

## 2017-08-22 NOTE — PROGRESS NOTES
Patient presented after waiting 30 minutes with no reaction to allergy injections. Discharged from clinic.  Lori Nguyen RN ............   8/22/2017...4:13 PM

## 2017-08-29 ENCOUNTER — ALLIED HEALTH/NURSE VISIT (OUTPATIENT)
Dept: ALLERGY | Facility: CLINIC | Age: 37
End: 2017-08-29
Payer: COMMERCIAL

## 2017-08-29 DIAGNOSIS — J30.9 ALLERGIC RHINITIS, UNSPECIFIED: Primary | ICD-10-CM

## 2017-08-29 PROCEDURE — 95117 IMMUNOTHERAPY INJECTIONS: CPT

## 2017-08-29 NOTE — MR AVS SNAPSHOT
After Visit Summary   8/29/2017    Gogo Bourgeois    MRN: [id number]           Patient Information     Date Of Birth          1980        Visit Information        Provider Department      8/29/2017 2:45 PM FZ ALLERGY SHOTS [facility]        Today's Diagnoses     Allergic rhinitis, unspecified    -  1       Follow-ups after your visit        Your next 10 appointments already scheduled     Sep 05, 2017  2:15 PM CDT   Nurse Only with FZ ALLERGY SHOTS   [facility] ([facility])    [address] [address]   [phone]            Sep 12, 2017  1:00 PM CDT   Nurse Only with FZ ALLERGY SHOTS   [facility] ([facility])    [address] [address] [phone]            Sep 26, 2017  1:15 PM CDT   Nurse Only with FZ ALLERGY SHOTS   [facility] ([facility])    [address] [address] [phone]              Who to contact     If you have questions or need follow up information about today's clinic visit or your schedule please contact [facility] directly at [phone].  Normal or non-critical lab and imaging results will be communicated to you by "[employer]hart, letter or phone within 4 business days after the clinic has received the results. If you do not hear from us within 7 days, please contact the clinic through "[employer]hart or phone. If you have a critical or abnormal lab result, we will notify you by phone as soon as possible.  Submit refill requests through [employer] or call your pharmacy and they will forward the refill request to us. Please allow 3 business days for your refill to be completed.          Additional Information About Your Visit        [employer] Information     [employer] lets you send messages to your doctor, view your test results, renew your prescriptions, schedule appointments and more. To sign up, go to  [email or web address] . Click on \"Log in\" on the left side of the screen, which will take you to the Welcome page. Then click on \"Sign up Now\" on the right side of the page.     You will be asked to enter the access code listed below, as well as some personal information. Please follow the directions to create your username and password.     Your access code is: [email or web address]  Expires: 2017  3:38 PM     Your access code will  in 90 days. If you need help or a new code, please call your [place] clinic or [phone].        Care EveryWhere ID     This is your Care EveryWhere ID. This could be used by other organizations to access your [place] medical records  [id number]         Blood Pressure from Last 3 Encounters:   06/15/17 104/71   05/10/17 108/70   17 110/76    Weight from Last 3 Encounters:   06/15/17 62.8 kg (138 lb 6.4 oz)   05/10/17 61.7 kg (136 lb)   17 62.5 kg (137 lb 12.8 oz)              We Performed the Following     Allergy Shot: Two or more injections        Primary Care Provider Office Phone # Fax #    Paul Weber PA-C [phone]       [address]  [place] [address]        Equal Access to Services     [facility]: Hadii aad ku hadasho Soomaali, waaxda luqadaha, qaybta kaalmada adeegyada, waxay mora haychente negron . So [facility] [phone].    ATENCIÓN: Si habla español, tiene a arteaga disposición servicios gratuitos de asistencia lingüística. Luciana al [phone].    We comply with applicable federal civil rights laws and [place] laws. We do not discriminate on the basis of race, color, national origin, age, disability sex, sexual orientation or gender identity.            Thank you!     Thank you for choosing [facility] [place]  for your care. Our goal is always to provide you with excellent care. Hearing back from our patients is one way we can continue to improve our services. Please take a few minutes to complete the " written survey that you may receive in the mail after your visit with us. Thank you!             Your Updated Medication List - Protect others around you: Learn how to safely use, store and throw away your medicines at www.disposemymeds.org.          This list is accurate as of: 8/29/17  3:49 PM.  Always use your most recent med list.                   Brand Name Dispense Instructions for use Diagnosis    * ALLERGEN IMMUNOTHERAPY PRESCRIPTION     5 mL    Name of Mix: Mix #1  Mold Alternaria Tenuis GLY 1:10 w/v, HS  0.5 ml Hormodendrum Cladosporioides 1:10 w/v, HS 0.5 ml Diluent: HSA qs to 5ml    Chronic seasonal allergic rhinitis due to pollen, Allergic rhinitis due to mold       * ALLERGEN IMMUNOTHERAPY PRESCRIPTION     5 mL    Name of Mix: Mix #2  Tree  Julio Cesar, White  GLY 1:20 w/v, HS  0.5 ml Birch Mix GLY 1:20 w/v, HS  0.5 ml Boxelder-Maple  Mix BHR (Boxelder Hard Red) 1:20 w/v, HS  0.5 ml [place], Common GLY 1:20 w/v, HS  0.5 ml Elm, American GLY 1:20 w/v, HS  0.5 ml Hackberry GLY 1:20 w/v, HS 0.5 ml Hickory, Shagbark GLY 1:20 w/v, HS  0.5 ml [place] Mix GLY 1:20 w/v, HS 0.5 ml [place], Black GLY 1:20 w/v, HS 0.5 ml Diluent: HSA qs to 5ml    Chronic seasonal allergic rhinitis due to pollen, Allergic rhinitis due to mold       * ALLERGEN IMMUNOTHERAPY PRESCRIPTION     5 mL    Name of Mix: Mix #3  Weeds Nettle GLY 1:20 w/v, HS 0.5 ml Pigweed, Careless GLY 1:20 w/v, HS 0.5 ml Plantain, English GLY 1:20 w/v, HS 0.5 ml Diluent: HSA qs to 5ml    Chronic seasonal allergic rhinitis due to pollen, Allergic rhinitis due to mold       EPINEPHrine 0.3 MG/0.3ML injection 2-pack    AUVI-Q    2 mL    Inject 0.3 mLs (0.3 mg) into the muscle as needed for anaphylaxis    Need for desensitization to allergens       fluticasone 50 MCG/ACT spray    FLONASE    3 Bottle    Spray 1-2 sprays into both nostrils daily    Seasonal allergic rhinitis, unspecified allergic rhinitis trigger       MULTI-VITAMIN PO      Reported on 3/10/2017         olopatadine 0.1 % ophthalmic solution    PATANOL    5 mL    Place 1 drop into both eyes 2 times daily    Chronic allergic conjunctivitis       SOLUBLE FIBER/PROBIOTICS PO           * Notice:  This list has 3 medication(s) that are the same as other medications prescribed for you. Read the directions carefully, and ask your doctor or other care provider to review them with you.

## 2017-08-29 NOTE — PROGRESS NOTES
Patient presented after waiting 30 minutes with no reaction to allergy injections. Discharged from clinic.  Lori Nguyen RN ............   8/29/2017...3:49 PM

## 2017-09-12 ENCOUNTER — ALLIED HEALTH/NURSE VISIT (OUTPATIENT)
Dept: ALLERGY | Facility: CLINIC | Age: 37
End: 2017-09-12
Payer: COMMERCIAL

## 2017-09-12 DIAGNOSIS — J30.9 ALLERGIC RHINITIS, UNSPECIFIED: Primary | ICD-10-CM

## 2017-09-12 PROCEDURE — 95117 IMMUNOTHERAPY INJECTIONS: CPT

## 2017-09-12 NOTE — PROGRESS NOTES
Patient presented after waiting 30 minutes with no reaction to allergy injections. Discharged from clinic.  Lori Nguyen RN ............   9/12/2017...1:26 PM

## 2017-09-12 NOTE — MR AVS SNAPSHOT
After Visit Summary   9/12/2017    Gogo Bourgeois    MRN: [id number]           Patient Information     Date Of Birth          1980        Visit Information        Provider Department      9/12/2017 1:00 PM FZ ALLERGY SHOTS [facility]        Today's Diagnoses     Allergic rhinitis, unspecified    -  1       Follow-ups after your visit        Your next 10 appointments already scheduled     Sep 20, 2017  3:00 PM CDT   Nurse Only with FZ ALLERGY SHOTS   [facility] ([facility])    [address]  [place] [address]   [phone]            Sep 26, 2017  1:15 PM CDT   Nurse Only with FZ ALLERGY SHOTS   [facility] ([facility])    [address]  [place] [address]   [phone]            Oct 04, 2017  3:00 PM CDT   Nurse Only with FZ ALLERGY SHOTS   [facility] ([facility])    [address]  [place] [address]   [phone]            Oct 10, 2017  1:00 PM CDT   Nurse Only with FZ ALLERGY SHOTS   [facility] ([facility])    [address]  [place] [address]   [phone]              Who to contact     If you have questions or need follow up information about today's clinic visit or your schedule please contact [facility] directly at [phone].  Normal or non-critical lab and imaging results will be communicated to you by MyChart, letter or phone within 4 business days after the clinic has received the results. If you do not hear from us within 7 days, please contact the clinic through [employer] or phone. If you have a critical or abnormal lab result, we will notify you by phone as soon as possible.  Submit refill requests through [employer] or call your pharmacy and they will forward the refill request to us. Please allow 3 business days for your refill to be completed.          Additional Information About Your Visit       "  MyChart Information     [employer] lets you send messages to your doctor, view your test results, renew your prescriptions, schedule appointments and more. To sign up, go to [email or web address] . Click on \"Log in\" on the left side of the screen, which will take you to the Welcome page. Then click on \"Sign up Now\" on the right side of the page.     You will be asked to enter the access code listed below, as well as some personal information. Please follow the directions to create your username and password.     Your access code is: [email or web address]  Expires: 2017  3:38 PM     Your access code will  in 90 days. If you need help or a new code, please call your [place] clinic or [phone].        Care EveryWhere ID     This is your Care EveryWhere ID. This could be used by other organizations to access your [place] medical records  [id number]         Blood Pressure from Last 3 Encounters:   06/15/17 104/71   05/10/17 108/70   17 110/76    Weight from Last 3 Encounters:   06/15/17 62.8 kg (138 lb 6.4 oz)   05/10/17 61.7 kg (136 lb)   17 62.5 kg (137 lb 12.8 oz)              Today, you had the following     No orders found for display       Primary Care Provider Office Phone # Fax #    Paul Weber PA-C [phone]       [address]  [place] [address]        Equal Access to Services     MEGHAN CARR : Hadii madai ku hadasho Soomaali, waaxda luqadaha, qaybta kaalmada adeegyachiqui, rema negron . So [facility] [phone].    ATENCIÓN: Si habla español, tiene a arteaga disposición servicios gratuitos de asistencia lingüística. Llame al [phone].    We comply with applicable federal civil rights laws and [place] laws. We do not discriminate on the basis of race, color, national origin, age, disability sex, sexual orientation or gender identity.            Thank you!     Thank you for choosing [facility] [place]  for your care. Our goal is always " to provide you with excellent care. Hearing back from our patients is one way we can continue to improve our services. Please take a few minutes to complete the written survey that you may receive in the mail after your visit with us. Thank you!             Your Updated Medication List - Protect others around you: Learn how to safely use, store and throw away your medicines at www.disposemymeds.org.          This list is accurate as of: 9/12/17  1:26 PM.  Always use your most recent med list.                   Brand Name Dispense Instructions for use Diagnosis    * ALLERGEN IMMUNOTHERAPY PRESCRIPTION     5 mL    Name of Mix: Mix #1  Mold Alternaria Tenuis GLY 1:10 w/v, HS  0.5 ml Hormodendrum Cladosporioides 1:10 w/v, HS 0.5 ml Diluent: HSA qs to 5ml    Chronic seasonal allergic rhinitis due to pollen, Allergic rhinitis due to mold       * ALLERGEN IMMUNOTHERAPY PRESCRIPTION     5 mL    Name of Mix: Mix #2  Tree  Julio Cesar, White  GLY 1:20 w/v, HS  0.5 ml Birch Mix GLY 1:20 w/v, HS  0.5 ml Boxelder-Maple  Mix BHR (Boxelder Hard Red) 1:20 w/v, HS  0.5 ml [place], Common GLY 1:20 w/v, HS  0.5 ml Elm, American GLY 1:20 w/v, HS  0.5 ml Hackberry GLY 1:20 w/v, HS 0.5 ml Hickory, Shagbark GLY 1:20 w/v, HS  0.5 ml [place] Mix GLY 1:20 w/v, HS 0.5 ml [place], Black GLY 1:20 w/v, HS 0.5 ml Diluent: HSA qs to 5ml    Chronic seasonal allergic rhinitis due to pollen, Allergic rhinitis due to mold       * ALLERGEN IMMUNOTHERAPY PRESCRIPTION     5 mL    Name of Mix: Mix #3  Weeds Nettle GLY 1:20 w/v, HS 0.5 ml Pigweed, Careless GLY 1:20 w/v, HS 0.5 ml Plantain, English GLY 1:20 w/v, HS 0.5 ml Diluent: HSA qs to 5ml    Chronic seasonal allergic rhinitis due to pollen, Allergic rhinitis due to mold       EPINEPHrine 0.3 MG/0.3ML injection 2-pack    AUVI-Q    2 mL    Inject 0.3 mLs (0.3 mg) into the muscle as needed for anaphylaxis    Need for desensitization to allergens       fluticasone 50 MCG/ACT spray    FLONASE    3 Bottle     Spray 1-2 sprays into both nostrils daily    Seasonal allergic rhinitis, unspecified allergic rhinitis trigger       MULTI-VITAMIN PO      Reported on 3/10/2017        olopatadine 0.1 % ophthalmic solution    PATANOL    5 mL    Place 1 drop into both eyes 2 times daily    Chronic allergic conjunctivitis       SOLUBLE FIBER/PROBIOTICS PO           * Notice:  This list has 3 medication(s) that are the same as other medications prescribed for you. Read the directions carefully, and ask your doctor or other care provider to review them with you.

## 2017-09-20 ENCOUNTER — ALLIED HEALTH/NURSE VISIT (OUTPATIENT)
Dept: ALLERGY | Facility: CLINIC | Age: 37
End: 2017-09-20
Payer: COMMERCIAL

## 2017-09-20 DIAGNOSIS — J30.9 ALLERGIC RHINITIS, UNSPECIFIED: Primary | ICD-10-CM

## 2017-09-20 PROCEDURE — 95117 IMMUNOTHERAPY INJECTIONS: CPT

## 2017-09-20 NOTE — MR AVS SNAPSHOT
After Visit Summary   9/20/2017    Gogo Bourgeois    MRN: [id number]           Patient Information     Date Of Birth          1980        Visit Information        Provider Department      9/20/2017 3:00 PM FZ ALLERGY SHOTS [facility]        Today's Diagnoses     Allergic rhinitis, unspecified    -  1       Follow-ups after your visit        Your next 10 appointments already scheduled     Sep 26, 2017  1:15 PM CDT   Nurse Only with FZ ALLERGY SHOTS   [facility] ([facility]    [address] [address]   [phone]            Oct 04, 2017  3:00 PM CDT   Nurse Only with FZ ALLERGY SHOTS   [facility] ([facility]    [address] [address]   [phone]            Oct 10, 2017  1:00 PM CDT   Nurse Only with FZ ALLERGY SHOTS   [facility] ([facility]    [address] [address] [phone]              Who to contact     If you have questions or need follow up information about today's clinic visit or your schedule please contact [facility] directly at [phone].  Normal or non-critical lab and imaging results will be communicated to you by [employer], letter or phone within 4 business days after the clinic has received the results. If you do not hear from us within 7 days, please contact the clinic through [employer] or phone. If you have a critical or abnormal lab result, we will notify you by phone as soon as possible.  Submit refill requests through Xangati or call your pharmacy and they will forward the refill request to us. Please allow 3 business days for your refill to be completed.          Additional Information About Your Visit        Xangati Information     Xangati lets you send messages to your doctor, view your test results, renew your prescriptions, schedule appointments and more. To sign up, go to  [email or web address] . Click on \"Log in\" on the left side of the screen, which will take you to the Welcome page. Then click on \"Sign up Now\" on the right side of the page.     You will be asked to enter the access code listed below, as well as some personal information. Please follow the directions to create your username and password.     Your access code is: [email or web address]  Expires: 2017  3:48 PM     Your access code will  in 90 days. If you need help or a new code, please call your [place] clinic or [phone].        Care EveryWhere ID     This is your Care EveryWhere ID. This could be used by other organizations to access your [place] medical records  [id number]         Blood Pressure from Last 3 Encounters:   06/15/17 104/71   05/10/17 108/70   17 110/76    Weight from Last 3 Encounters:   06/15/17 62.8 kg (138 lb 6.4 oz)   05/10/17 61.7 kg (136 lb)   17 62.5 kg (137 lb 12.8 oz)              We Performed the Following     Allergy Shot: Two or more injections        Primary Care Provider Office Phone # Fax #    Paul Weber PA-C [phone]       [address]  [place] [address]        Equal Access to Services     [facility]: Hadii aad ku hadasho Soomaali, waaxda luqadaha, qaybta kaalmada adeegyada, waxay mora haychente negron . So [facility] [phone].    ATENCIÓN: Si habla español, tiene a arteaga disposición servicios gratuitos de asistencia lingüística. Llame al [phone].    We comply with applicable federal civil rights laws and [place] laws. We do not discriminate on the basis of race, color, national origin, age, disability sex, sexual orientation or gender identity.            Thank you!     Thank you for choosing [facility]  for your care. Our goal is always to provide you with excellent care. Hearing back from our patients is one way we can continue to improve our services. Please take a few minutes to complete the " written survey that you may receive in the mail after your visit with us. Thank you!             Your Updated Medication List - Protect others around you: Learn how to safely use, store and throw away your medicines at www.disposemymeds.org.          This list is accurate as of: 9/20/17  3:48 PM.  Always use your most recent med list.                   Brand Name Dispense Instructions for use Diagnosis    * ALLERGEN IMMUNOTHERAPY PRESCRIPTION     5 mL    Name of Mix: Mix #1  Mold Alternaria Tenuis GLY 1:10 w/v, HS  0.5 ml Hormodendrum Cladosporioides 1:10 w/v, HS 0.5 ml Diluent: HSA qs to 5ml    Chronic seasonal allergic rhinitis due to pollen, Allergic rhinitis due to mold       * ALLERGEN IMMUNOTHERAPY PRESCRIPTION     5 mL    Name of Mix: Mix #2  Tree  Julio Cesar, White  GLY 1:20 w/v, HS  0.5 ml Birch Mix GLY 1:20 w/v, HS  0.5 ml Boxelder-Maple  Mix BHR (Boxelder Hard Red) 1:20 w/v, HS  0.5 ml [place], Common GLY 1:20 w/v, HS  0.5 ml Elm, American GLY 1:20 w/v, HS  0.5 ml Hackberry GLY 1:20 w/v, HS 0.5 ml Hickory, Shagbark GLY 1:20 w/v, HS  0.5 ml [place] Mix GLY 1:20 w/v, HS 0.5 ml [place], Black GLY 1:20 w/v, HS 0.5 ml Diluent: HSA qs to 5ml    Chronic seasonal allergic rhinitis due to pollen, Allergic rhinitis due to mold       * ALLERGEN IMMUNOTHERAPY PRESCRIPTION     5 mL    Name of Mix: Mix #3  Weeds Nettle GLY 1:20 w/v, HS 0.5 ml Pigweed, Careless GLY 1:20 w/v, HS 0.5 ml Plantain, English GLY 1:20 w/v, HS 0.5 ml Diluent: HSA qs to 5ml    Chronic seasonal allergic rhinitis due to pollen, Allergic rhinitis due to mold       EPINEPHrine 0.3 MG/0.3ML injection 2-pack    AUVI-Q    2 mL    Inject 0.3 mLs (0.3 mg) into the muscle as needed for anaphylaxis    Need for desensitization to allergens       fluticasone 50 MCG/ACT spray    FLONASE    3 Bottle    Spray 1-2 sprays into both nostrils daily    Seasonal allergic rhinitis, unspecified allergic rhinitis trigger       MULTI-VITAMIN PO      Reported on 3/10/2017         olopatadine 0.1 % ophthalmic solution    PATANOL    5 mL    Place 1 drop into both eyes 2 times daily    Chronic allergic conjunctivitis       SOLUBLE FIBER/PROBIOTICS PO           * Notice:  This list has 3 medication(s) that are the same as other medications prescribed for you. Read the directions carefully, and ask your doctor or other care provider to review them with you.

## 2017-09-20 NOTE — PROGRESS NOTES
Patient presented after waiting 30 minutes with no reaction to allergy injections. Discharged from clinic.    Karissa Reynolds RN ....... 9/20/2017 3:48 PM

## 2017-10-04 ENCOUNTER — ALLIED HEALTH/NURSE VISIT (OUTPATIENT)
Dept: ALLERGY | Facility: CLINIC | Age: 37
End: 2017-10-04
Payer: COMMERCIAL

## 2017-10-04 DIAGNOSIS — J30.1 ALLERGIC RHINITIS DUE TO POLLEN: Primary | ICD-10-CM

## 2017-10-04 PROCEDURE — 99207 ZZC NO CHARGE LOS: CPT

## 2017-10-04 PROCEDURE — 95117 IMMUNOTHERAPY INJECTIONS: CPT

## 2017-10-04 NOTE — PROGRESS NOTES
Patient presented after waiting 30 minutes with no reaction to allergy injections. Discharged from clinic.    Kerri Gaines RN

## 2017-10-10 ENCOUNTER — ALLIED HEALTH/NURSE VISIT (OUTPATIENT)
Dept: ALLERGY | Facility: CLINIC | Age: 37
End: 2017-10-10
Payer: COMMERCIAL

## 2017-10-10 DIAGNOSIS — J30.1 ALLERGIC RHINITIS DUE TO POLLEN: Primary | ICD-10-CM

## 2017-10-10 PROCEDURE — 99207 ZZC NO CHARGE LOS: CPT

## 2017-10-10 PROCEDURE — 95117 IMMUNOTHERAPY INJECTIONS: CPT

## 2017-10-10 NOTE — MR AVS SNAPSHOT
After Visit Summary   10/10/2017    Gogo Bourgeois    MRN: [id number]           Patient Information     Date Of Birth          1980        Visit Information        Provider Department      10/10/2017 1:00 PM FZ ALLERGY SHOTS [facility]        Today's Diagnoses     Allergic rhinitis due to pollen    -  1       Follow-ups after your visit        Your next 10 appointments already scheduled     Oct 17, 2017  3:00 PM CDT   Nurse Only with FZ ALLERGY SHOTS   [facility] ([facility])    [address]  [place] [address]   [phone]            Oct 24, 2017 12:30 PM CDT   Nurse Only with FZ ALLERGY SHOTS   [facility] ([facility])    [address]  [place] [address]   [phone]            Oct 31, 2017  3:15 PM CDT   Nurse Only with FZ ALLERGY SHOTS   [facility] ([facility])    [address]  [place] [address]   [phone]            Nov 07, 2017 12:45 PM CST   Nurse Only with FZ ALLERGY SHOTS   [facility] ([facility])    [address]  [place] [address]   [phone]            Nov 14, 2017  3:15 PM CST   Nurse Only with FZ ALLERGY SHOTS   [facility] ([facility])    [address]  [place] [address]   [phone]            Nov 21, 2017  1:15 PM CST   Nurse Only with FZ ALLERGY SHOTS   [facility] ([facility])    [address]  [place] [address]   [phone]              Who to contact     If you have questions or need follow up information about today's clinic visit or your schedule please contact [facility] directly at [phone].  Normal or non-critical lab and imaging results will be communicated to you by MyChart, letter or phone within 4 business days after the clinic has received the results. If you do not hear from us within 7  "days, please contact the clinic through Echolocation or phone. If you have a critical or abnormal lab result, we will notify you by phone as soon as possible.  Submit refill requests through Echolocation or call your pharmacy and they will forward the refill request to us. Please allow 3 business days for your refill to be completed.          Additional Information About Your Visit        [employer] Information     Echolocation lets you send messages to your doctor, view your test results, renew your prescriptions, schedule appointments and more. To sign up, go to [email or web address] . Click on \"Log in\" on the left side of the screen, which will take you to the Welcome page. Then click on \"Sign up Now\" on the right side of the page.     You will be asked to enter the access code listed below, as well as some personal information. Please follow the directions to create your username and password.     Your access code is: [email or web address]  Expires: 2017  3:48 PM     Your access code will  in 90 days. If you need help or a new code, please call your [place] clinic or [phone].        Care EveryWhere ID     This is your Care EveryWhere ID. This could be used by other organizations to access your [place] medical records  [id number]         Blood Pressure from Last 3 Encounters:   06/15/17 104/71   05/10/17 108/70   17 110/76    Weight from Last 3 Encounters:   06/15/17 62.8 kg (138 lb 6.4 oz)   05/10/17 61.7 kg (136 lb)   17 62.5 kg (137 lb 12.8 oz)              We Performed the Following     Allergy Shot: Two or more injections        Primary Care Provider Office Phone # Fax #    Paul Weber PA-C [phone]       [address] [address]        Equal Access to Services     MEGHAN CARR : Sandra ruiz Sokeisha, waaxda luqadaha, qaybta kaalmada mando, rema negron . [facility] [phone].    ATENCIÓN: Si stanton curry " disposición servicios gratuitos de asistencia lingüística. Luciana [place]ton [phone].    We comply with applicable federal civil rights laws and [place] laws. We do not discriminate on the basis of race, color, national origin, age, disability, sex, sexual orientation, or gender identity.            Thank you!     Thank you for choosing [facility]  for your care. Our goal is always to provide you with excellent care. Hearing back from our patients is one way we can continue to improve our services. Please take a few minutes to complete the written survey that you may receive in the mail after your visit with us. Thank you!             Your Updated Medication List - Protect others around you: Learn how to safely use, store and throw away your medicines at www.disposemymeds.org.          This list is accurate as of: 10/10/17  1:55 PM.  Always use your most recent med list.                   Brand Name Dispense Instructions for use Diagnosis    * ALLERGEN IMMUNOTHERAPY PRESCRIPTION     5 mL    Name of Mix: Mix #1  Mold Alternaria Tenuis GLY 1:10 w/v, HS  0.5 ml Hormodendrum Cladosporioides 1:10 w/v, HS 0.5 ml Diluent: HSA qs to 5ml    Chronic seasonal allergic rhinitis due to pollen, Allergic rhinitis due to mold       * ALLERGEN IMMUNOTHERAPY PRESCRIPTION     5 mL    Name of Mix: Mix #2  Tree  Julio Cesar, White  GLY 1:20 w/v, HS  0.5 ml Birch Mix GLY 1:20 w/v, HS  0.5 ml Boxelder-Maple  Mix BHR (Boxelder Hard Red) 1:20 w/v, HS  0.5 ml [place], Common GLY 1:20 w/v, HS  0.5 ml Elm, American GLY 1:20 w/v, HS  0.5 ml Hackberry GLY 1:20 w/v, HS 0.5 ml Hickory, Shagbark GLY 1:20 w/v, HS  0.5 ml [place] Mix GLY 1:20 w/v, HS 0.5 ml [place], Black GLY 1:20 w/v, HS 0.5 ml Diluent: HSA qs to 5ml    Chronic seasonal allergic rhinitis due to pollen, Allergic rhinitis due to mold       * ALLERGEN IMMUNOTHERAPY PRESCRIPTION     5 mL    Name of Mix: Mix #3  Weeds Nettle GLY 1:20 w/v, HS 0.5 ml Pigweed, Careless GLY 1:20 w/v,  HS 0.5 ml Plantain, English GLY 1:20 w/v, HS 0.5 ml Diluent: HSA qs to 5ml    Chronic seasonal allergic rhinitis due to pollen, Allergic rhinitis due to mold       EPINEPHrine 0.3 MG/0.3ML injection 2-pack    AUVI-Q    2 mL    Inject 0.3 mLs (0.3 mg) into the muscle as needed for anaphylaxis    Need for desensitization to allergens       fluticasone 50 MCG/ACT spray    FLONASE    3 Bottle    Spray 1-2 sprays into both nostrils daily    Seasonal allergic rhinitis, unspecified allergic rhinitis trigger       MULTI-VITAMIN PO      Reported on 3/10/2017        olopatadine 0.1 % ophthalmic solution    PATANOL    5 mL    Place 1 drop into both eyes 2 times daily    Chronic allergic conjunctivitis       SOLUBLE FIBER/PROBIOTICS PO           * Notice:  This list has 3 medication(s) that are the same as other medications prescribed for you. Read the directions carefully, and ask your doctor or other care provider to review them with you.

## 2017-10-17 ENCOUNTER — ALLIED HEALTH/NURSE VISIT (OUTPATIENT)
Dept: ALLERGY | Facility: CLINIC | Age: 37
End: 2017-10-17
Payer: COMMERCIAL

## 2017-10-17 DIAGNOSIS — J30.1 ALLERGIC RHINITIS DUE TO POLLEN: Primary | ICD-10-CM

## 2017-10-17 PROCEDURE — 99207 ZZC NO CHARGE LOS: CPT

## 2017-10-17 PROCEDURE — 95117 IMMUNOTHERAPY INJECTIONS: CPT

## 2017-10-17 NOTE — PROGRESS NOTES
Patient presents for allergy injections today, and reports that she has been experiencing a little increase in her allergy symptoms lately - occasionally more nasal congestion, and itchy throat symptoms. Patient has not been taking allergy medications orally, has been using nasal spray. Patient has not been premedicating for shots. On review of provider's LOV note 6/15/17 with Dr. Olivas, patient is to be premedicating on shot days with 10 mg of Zyrtec at least 60 min prior to injections. Reviewed these instructions with the patient, the importance of premedicating and provided AVS with these instructions.     The following medication was given:     MEDICATION:Cetirizine  ROUTE: PO  SITE: mouth  DOSE: 10 mg  LOT #: [id number]  : [employer]  EXPIRATION DATE:  12/2018  NDC#: 9717-1905-38       Patient presented after waiting 30 minutes with no reaction to allergy injections. Discharged from clinic.  Lori Nguyen RN ............   10/17/2017...3:53 PM

## 2017-10-17 NOTE — PATIENT INSTRUCTIONS
On you allergy injections days take over the counter antihistamine such as Zyrtec, Claritin or Allegra one tablet at least 60 minutes prior to your appointment.

## 2017-10-17 NOTE — MR AVS SNAPSHOT
After Visit Summary   10/17/2017    Gogo Bourgeois    MRN: [id number]           Patient Information     Date Of Birth          1980        Visit Information        Provider Department      10/17/2017 3:00 PM FZ ALLERGY SHOTS [facility]        Care Instructions    On you allergy injections days take over the counter antihistamine such as Zyrtec, Claritin or Allegra one tablet at least 60 minutes prior to your appointment.                 Follow-ups after your visit        Your next 10 appointments already scheduled     Oct 24, 2017 12:30 PM CDT   Nurse Only with FZ ALLERGY SHOTS   [facility] ([facility])    [address]  [place] [address]   [phone]            Oct 31, 2017  3:15 PM CDT   Nurse Only with FZ ALLERGY SHOTS   [facility] ([facility])    [address]  [place] [address]   [phone]            Nov 07, 2017 12:45 PM CST   Nurse Only with FZ ALLERGY SHOTS   [facility] ([facility])    [address] [address]   [phone]            Nov 14, 2017  3:15 PM CST   Nurse Only with FZ ALLERGY SHOTS   [facility] ([facility])    [address] [facility] [address]   [phone]            Nov 21, 2017  1:15 PM CST   Nurse Only with FZ ALLERGY SHOTS   [facility] ([facility])    [address] [facility] [address]   [phone]              Who to contact     If you have questions or need follow up information about today's clinic visit or your schedule please contact [facility] directly at [phone].  Normal or non-critical lab and imaging results will be communicated to you by MyChart, letter or phone within 4 business days after the clinic has received the results. If you do not hear from us within 7 days, please contact the clinic through MyChart or phone.  "If you have a critical or abnormal lab result, we will notify you by phone as soon as possible.  Submit refill requests through Beamly or call your pharmacy and they will forward the refill request to us. Please allow 3 business days for your refill to be completed.          Additional Information About Your Visit        [employer] Information     Beamly lets you send messages to your doctor, view your test results, renew your prescriptions, schedule appointments and more. To sign up, go to [email or web address] . Click on \"Log in\" on the left side of the screen, which will take you to the Welcome page. Then click on \"Sign up Now\" on the right side of the page.     You will be asked to enter the access code listed below, as well as some personal information. Please follow the directions to create your username and password.     Your access code is: [email or web address]  Expires: 2017  3:48 PM     Your access code will  in 90 days. If you need help or a new code, please call your [place] clinic or [phone].        Care EveryWhere ID     This is your Care EveryWhere ID. This could be used by other organizations to access your [place] medical records  [id number]         Blood Pressure from Last 3 Encounters:   06/15/17 104/71   05/10/17 108/70   17 110/76    Weight from Last 3 Encounters:   06/15/17 62.8 kg (138 lb 6.4 oz)   05/10/17 61.7 kg (136 lb)   17 62.5 kg (137 lb 12.8 oz)              Today, you had the following     No orders found for display       Primary Care Provider Office Phone # Fax #    Paul Weber PA-C [phone]       [address] [address]        Equal Access to Services     [facility] : Sandra fernandezo Sokeisha, waaxda luqadaha, qaybta kaalmada adeegyachiqui, rema umaña. So [facility] [phone].    ATENCIÓN: Si habla español, tiene a arteaga disposición servicios gratuitos de asistencia lingüística. Llame al " [phone].    We comply with applicable federal civil rights laws and [place] laws. We do not discriminate on the basis of race, color, national origin, age, disability, sex, sexual orientation, or gender identity.            Thank you!     Thank you for choosing [facility]  for your care. Our goal is always to provide you with excellent care. Hearing back from our patients is one way we can continue to improve our services. Please take a few minutes to complete the written survey that you may receive in the mail after your visit with us. Thank you!             Your Updated Medication List - Protect others around you: Learn how to safely use, store and throw away your medicines at www.disposemymeds.org.          This list is accurate as of: 10/17/17  3:21 PM.  Always use your most recent med list.                   Brand Name Dispense Instructions for use Diagnosis    * ALLERGEN IMMUNOTHERAPY PRESCRIPTION     5 mL    Name of Mix: Mix #1  Mold Alternaria Tenuis GLY 1:10 w/v, HS  0.5 ml Hormodendrum Cladosporioides 1:10 w/v, HS 0.5 ml Diluent: HSA qs to 5ml    Chronic seasonal allergic rhinitis due to pollen, Allergic rhinitis due to mold       * ALLERGEN IMMUNOTHERAPY PRESCRIPTION     5 mL    Name of Mix: Mix #2  Tree  Julio Cesar, White  GLY 1:20 w/v, HS  0.5 ml Birch Mix GLY 1:20 w/v, HS  0.5 ml Boxelder-Maple  Mix BHR (Boxelder Hard Red) 1:20 w/v, HS  0.5 ml [place], Common GLY 1:20 w/v, HS  0.5 ml Elm, American GLY 1:20 w/v, HS  0.5 ml Hackberry GLY 1:20 w/v, HS 0.5 ml Hickory, Shagbark GLY 1:20 w/v, HS  0.5 ml [place] Mix GLY 1:20 w/v, HS 0.5 ml [place], Black GLY 1:20 w/v, HS 0.5 ml Diluent: HSA qs to 5ml    Chronic seasonal allergic rhinitis due to pollen, Allergic rhinitis due to mold       * ALLERGEN IMMUNOTHERAPY PRESCRIPTION     5 mL    Name of Mix: Mix #3  Weeds Nettle GLY 1:20 w/v, HS 0.5 ml Pigweed, Careless GLY 1:20 w/v, HS 0.5 ml Plantain, English GLY 1:20 w/v, HS 0.5 ml Diluent: HSA qs  to 5ml    Chronic seasonal allergic rhinitis due to pollen, Allergic rhinitis due to mold       EPINEPHrine 0.3 MG/0.3ML injection 2-pack    AUVI-Q    2 mL    Inject 0.3 mLs (0.3 mg) into the muscle as needed for anaphylaxis    Need for desensitization to allergens       fluticasone 50 MCG/ACT spray    FLONASE    3 Bottle    Spray 1-2 sprays into both nostrils daily    Seasonal allergic rhinitis, unspecified allergic rhinitis trigger       MULTI-VITAMIN PO      Reported on 3/10/2017        olopatadine 0.1 % ophthalmic solution    PATANOL    5 mL    Place 1 drop into both eyes 2 times daily    Chronic allergic conjunctivitis       SOLUBLE FIBER/PROBIOTICS PO           * Notice:  This list has 3 medication(s) that are the same as other medications prescribed for you. Read the directions carefully, and ask your doctor or other care provider to review them with you.

## 2017-10-24 ENCOUNTER — ALLIED HEALTH/NURSE VISIT (OUTPATIENT)
Dept: ALLERGY | Facility: CLINIC | Age: 37
End: 2017-10-24
Payer: COMMERCIAL

## 2017-10-24 DIAGNOSIS — J30.1 ALLERGIC RHINITIS DUE TO POLLEN: Primary | ICD-10-CM

## 2017-10-24 PROCEDURE — 95117 IMMUNOTHERAPY INJECTIONS: CPT

## 2017-10-24 PROCEDURE — 99207 ZZC NO CHARGE LOS: CPT

## 2017-10-24 NOTE — MR AVS SNAPSHOT
After Visit Summary   10/24/2017    Gogo Bourgeois    MRN: [id number]           Patient Information     Date Of Birth          1980        Visit Information        Provider Department      10/24/2017 12:30 PM FZ ALLERGY SHOTS [facility]        Today's Diagnoses     Allergic rhinitis due to pollen    -  1       Follow-ups after your visit        Your next 10 appointments already scheduled     Oct 31, 2017  3:15 PM CDT   Nurse Only with FZ ALLERGY SHOTS   [facility]    [address]  [place] [address]   [phone]            Nov 07, 2017 12:45 PM CST   Nurse Only with FZ ALLERGY SHOTS   [facility]    [address]  [place] [address]   [phone]            Nov 14, 2017  3:15 PM CST   Nurse Only with FZ ALLERGY SHOTS   [facility]    [address]  [place] [address]   [phone]            Nov 21, 2017  1:15 PM CST   Nurse Only with FZ ALLERGY SHOTS   [facility]    [address]  [place] [address]   [phone]              Who to contact     If you have questions or need follow up information about today's clinic visit or your schedule please contact [facility] directly at [phone].  Normal or non-critical lab and imaging results will be communicated to you by MyChart, letter or phone within 4 business days after the clinic has received the results. If you do not hear from us within 7 days, please contact the clinic through MyChart or phone. If you have a critical or abnormal lab result, we will notify you by phone as soon as possible.  Submit refill requests through [employer] or call your pharmacy and they will forward the refill request to us. Please allow 3 business days for your refill to be completed.          Additional Information About Your Visit    "     MyChart Information     [employer] lets you send messages to your doctor, view your test results, renew your prescriptions, schedule appointments and more. To sign up, go to [email or web address] . Click on \"Log in\" on the left side of the screen, which will take you to the Welcome page. Then click on \"Sign up Now\" on the right side of the page.     You will be asked to enter the access code listed below, as well as some personal information. Please follow the directions to create your username and password.     Your access code is: [email or web address]  Expires: 2017  3:48 PM     Your access code will  in 90 days. If you need help or a new code, please call your [place] clinic or [phone].        Care EveryWhere ID     This is your Care EveryWhere ID. This could be used by other organizations to access your [place] medical records  [id number]         Blood Pressure from Last 3 Encounters:   06/15/17 104/71   05/10/17 108/70   17 110/76    Weight from Last 3 Encounters:   06/15/17 62.8 kg (138 lb 6.4 oz)   05/10/17 61.7 kg (136 lb)   17 62.5 kg (137 lb 12.8 oz)              We Performed the Following     Allergy Shot: Two or more injections        Primary Care Provider Office Phone # Fax #    Paul Weber PA-C [phone]       [address] [facility] [address]        Equal Access to Services     [facility] : Hadii madai ku hadasho Soomaali, waaxda luqadaha, qaybta kaalmada adeegyada, rema umaña. So [facility] [place] [phone].    ATENCIÓN: Si habla español, tiene a arteaga disposición servicios gratuitos de asistencia lingüística. Llame al [phone].    We comply with applicable federal civil rights laws and [place] laws. We do not discriminate on the basis of race, color, national origin, age, disability, sex, sexual orientation, or gender identity.            Thank you!     Thank you for choosing [facility]  for your care. Our " goal is always to provide you with excellent care. Hearing back from our patients is one way we can continue to improve our services. Please take a few minutes to complete the written survey that you may receive in the mail after your visit with us. Thank you!             Your Updated Medication List - Protect others around you: Learn how to safely use, store and throw away your medicines at www.disposemymeds.org.          This list is accurate as of: 10/24/17  1:15 PM.  Always use your most recent med list.                   Brand Name Dispense Instructions for use Diagnosis    * ALLERGEN IMMUNOTHERAPY PRESCRIPTION     5 mL    Name of Mix: Mix #1  Mold Alternaria Tenuis GLY 1:10 w/v, HS  0.5 ml Hormodendrum Cladosporioides 1:10 w/v, HS 0.5 ml Diluent: HSA qs to 5ml    Chronic seasonal allergic rhinitis due to pollen, Allergic rhinitis due to mold       * ALLERGEN IMMUNOTHERAPY PRESCRIPTION     5 mL    Name of Mix: Mix #2  Tree  Julio Cesar, White  GLY 1:20 w/v, HS  0.5 ml Birch Mix GLY 1:20 w/v, HS  0.5 ml Boxelder-Maple  Mix BHR (Boxelder Hard Red) 1:20 w/v, HS  0.5 ml [place], Common GLY 1:20 w/v, HS  0.5 ml Elm, American GLY 1:20 w/v, HS  0.5 ml Hackberry GLY 1:20 w/v, HS 0.5 ml Hickory, Shagbark GLY 1:20 w/v, HS  0.5 ml [place] Mix GLY 1:20 w/v, HS 0.5 ml [place], Black GLY 1:20 w/v, HS 0.5 ml Diluent: HSA qs to 5ml    Chronic seasonal allergic rhinitis due to pollen, Allergic rhinitis due to mold       * ALLERGEN IMMUNOTHERAPY PRESCRIPTION     5 mL    Name of Mix: Mix #3  Weeds Nettle GLY 1:20 w/v, HS 0.5 ml Pigweed, Careless GLY 1:20 w/v, HS 0.5 ml Plantain, English GLY 1:20 w/v, HS 0.5 ml Diluent: HSA qs to 5ml    Chronic seasonal allergic rhinitis due to pollen, Allergic rhinitis due to mold       EPINEPHrine 0.3 MG/0.3ML injection 2-pack    AUVI-Q    2 mL    Inject 0.3 mLs (0.3 mg) into the muscle as needed for anaphylaxis    Need for desensitization to allergens       fluticasone 50 MCG/ACT spray    FLONASE     3 Bottle    Spray 1-2 sprays into both nostrils daily    Seasonal allergic rhinitis, unspecified allergic rhinitis trigger       MULTI-VITAMIN PO      Reported on 3/10/2017        olopatadine 0.1 % ophthalmic solution    PATANOL    5 mL    Place 1 drop into both eyes 2 times daily    Chronic allergic conjunctivitis       SOLUBLE FIBER/PROBIOTICS PO           * Notice:  This list has 3 medication(s) that are the same as other medications prescribed for you. Read the directions carefully, and ask your doctor or other care provider to review them with you.

## 2017-10-24 NOTE — PROGRESS NOTES
Patient presented after waiting 30 minutes with no reaction to allergy injections. Discharged from clinic.  Lori Nguyen RN ............   10/24/2017...1:14 PM

## 2017-11-07 ENCOUNTER — ALLIED HEALTH/NURSE VISIT (OUTPATIENT)
Dept: ALLERGY | Facility: CLINIC | Age: 37
End: 2017-11-07
Payer: COMMERCIAL

## 2017-11-07 DIAGNOSIS — J30.1 ALLERGIC RHINITIS DUE TO POLLEN: Primary | ICD-10-CM

## 2017-11-07 PROCEDURE — 99207 ZZC NO CHARGE LOS: CPT

## 2017-11-07 PROCEDURE — 95117 IMMUNOTHERAPY INJECTIONS: CPT

## 2017-11-07 NOTE — PROGRESS NOTES
Patient presented after waiting 30 minutes with no reaction to allergy injections. Discharged from clinic.  Lori Nguyen RN ............   11/7/2017...1:45 PM

## 2017-11-07 NOTE — MR AVS SNAPSHOT
"              After Visit Summary   11/7/2017    Gogo Bourgeois    MRN: [id number]           Patient Information     Date Of Birth          1980        Visit Information        Provider Department      11/7/2017 12:45 PM FZ ALLERGY SHOTS [facility]        Today's Diagnoses     Allergic rhinitis due to pollen    -  1       Follow-ups after your visit        Your next 10 appointments already scheduled     Nov 14, 2017  3:15 PM CST   Nurse Only with FZ ALLERGY SHOTS   [facility] ([facility]    [address] [address] [phone]            Nov 21, 2017  1:15 PM CST   Nurse Only with FZ ALLERGY SHOTS   [facility] ([facility]    [address] [address] [phone]              Who to contact     If you have questions or need follow up information about today's clinic visit or your schedule please contact [facility] directly at [phone].  Normal or non-critical lab and imaging results will be communicated to you by [employer], letter or phone within 4 business days after the clinic has received the results. If you do not hear from us within 7 days, please contact the clinic through [employer] or phone. If you have a critical or abnormal lab result, we will notify you by phone as soon as possible.  Submit refill requests through [employer] or call your pharmacy and they will forward the refill request to us. Please allow 3 business days for your refill to be completed.          Additional Information About Your Visit        [employer] Information     [employer] lets you send messages to your doctor, view your test results, renew your prescriptions, schedule appointments and more. To sign up, go to [email or web address] . Click on \"Log in\" on the left side of the screen, which will take you to the Welcome page. Then click on \"Sign up Now\" on the right side of the page.     You will be asked to " enter the access code listed below, as well as some personal information. Please follow the directions to create your username and password.     Your access code is: [email or web address]  Expires: 2017  2:48 PM     Your access code will  in 90 days. If you need help or a new code, please call your [place] clinic or [phone].        Care EveryWhere ID     This is your Care EveryWhere ID. This could be used by other organizations to access your [place] medical records  [id number]         Blood Pressure from Last 3 Encounters:   06/15/17 104/71   05/10/17 108/70   17 110/76    Weight from Last 3 Encounters:   06/15/17 62.8 kg (138 lb 6.4 oz)   05/10/17 61.7 kg (136 lb)   17 62.5 kg (137 lb 12.8 oz)              We Performed the Following     Allergy Shot: Two or more injections        Primary Care Provider Office Phone # Fax #    Paul Weber PA-C [phone]       [address]  [place] [address]        Equal Access to Services     : Hadii aad ku hadasho Soomaali, waaxda luqadaha, qaybta kaalmada adeegyada, waxay idiin hayaan adeeg kharash lagiovana . So [facility] [phone].    ATENCIÓN: Si habla español, tiene a arteaga disposición servicios gratuitos de asistencia lingüística. Llame al [phone].    We comply with applicable federal civil rights laws and [place] laws. We do not discriminate on the basis of race, color, national origin, age, disability, sex, sexual orientation, or gender identity.            Thank you!     Thank you for choosing [facility]  for your care. Our goal is always to provide you with excellent care. Hearing back from our patients is one way we can continue to improve our services. Please take a few minutes to complete the written survey that you may receive in the mail after your visit with us. Thank you!             Your Updated Medication List - Protect others around you: Learn how to safely use, store and throw away  your medicines at www.disposemymeds.org.          This list is accurate as of: 11/7/17  1:45 PM.  Always use your most recent med list.                   Brand Name Dispense Instructions for use Diagnosis    * ALLERGEN IMMUNOTHERAPY PRESCRIPTION     5 mL    Name of Mix: Mix #1  Mold Alternaria Tenuis GLY 1:10 w/v, HS  0.5 ml Hormodendrum Cladosporioides 1:10 w/v, HS 0.5 ml Diluent: HSA qs to 5ml    Chronic seasonal allergic rhinitis due to pollen, Allergic rhinitis due to mold       * ALLERGEN IMMUNOTHERAPY PRESCRIPTION     5 mL    Name of Mix: Mix #2  Tree  Julio Cesar, White  GLY 1:20 w/v, HS  0.5 ml Birch Mix GLY 1:20 w/v, HS  0.5 ml Boxelder-Maple  Mix BHR (Boxelder Hard Red) 1:20 w/v, HS  0.5 ml [place], Common GLY 1:20 w/v, HS  0.5 ml Elm, American GLY 1:20 w/v, HS  0.5 ml Hackberry GLY 1:20 w/v, HS 0.5 ml Hickory, Shagbark GLY 1:20 w/v, HS  0.5 ml [place] Mix GLY 1:20 w/v, HS 0.5 ml [place], Black GLY 1:20 w/v, HS 0.5 ml Diluent: HSA qs to 5ml    Chronic seasonal allergic rhinitis due to pollen, Allergic rhinitis due to mold       * ALLERGEN IMMUNOTHERAPY PRESCRIPTION     5 mL    Name of Mix: Mix #3  Weeds Nettle GLY 1:20 w/v, HS 0.5 ml Pigweed, Careless GLY 1:20 w/v, HS 0.5 ml Plantain, English GLY 1:20 w/v, HS 0.5 ml Diluent: HSA qs to 5ml    Chronic seasonal allergic rhinitis due to pollen, Allergic rhinitis due to mold       EPINEPHrine 0.3 MG/0.3ML injection 2-pack    AUVI-Q    2 mL    Inject 0.3 mLs (0.3 mg) into the muscle as needed for anaphylaxis    Need for desensitization to allergens       fluticasone 50 MCG/ACT spray    FLONASE    3 Bottle    Spray 1-2 sprays into both nostrils daily    Seasonal allergic rhinitis, unspecified allergic rhinitis trigger       MULTI-VITAMIN PO      Reported on 3/10/2017        olopatadine 0.1 % ophthalmic solution    PATANOL    5 mL    Place 1 drop into both eyes 2 times daily    Chronic allergic conjunctivitis       SOLUBLE FIBER/PROBIOTICS PO           * Notice:  This  list has 3 medication(s) that are the same as other medications prescribed for you. Read the directions carefully, and ask your doctor or other care provider to review them with you.

## 2017-11-14 ENCOUNTER — ALLIED HEALTH/NURSE VISIT (OUTPATIENT)
Dept: ALLERGY | Facility: CLINIC | Age: 37
End: 2017-11-14
Payer: COMMERCIAL

## 2017-11-14 DIAGNOSIS — J30.1 ALLERGIC RHINITIS DUE TO POLLEN: Primary | ICD-10-CM

## 2017-11-14 PROCEDURE — 95117 IMMUNOTHERAPY INJECTIONS: CPT

## 2017-11-14 NOTE — PROGRESS NOTES
Patient presents for allergy injections. Reports that at about 11 pm the evening of her last injections she woke up itching head to toe and felt like her throat was tight. States she got up, drank some milk and after awhile things started to settle down. She did not use her epi-pen and had not pre-medicated prior or after her injections. Reviewed with patient that she should have used her epipen and then had her boyfriend drive her to the emergency room, or call 9-11 if she is alone as she cannot drive herself to the emergency room. She verbalized understanding to this.     The following medication was given:     MEDICATION:Cetirizine  ROUTE: PO  SITE: mouth  DOSE: 10 mg   LOT #: [id number]  :  [employer]  EXPIRATION DATE:    NDC#: 9942-8090-09    Discussed with Dr. Olivas, who gave a verbal to repeat patient's dose. Patient is to be pre-medicating with 20 mg of cetirizine the evening prior to shots, 20 mg the morning of shots and 20 mg the evening of shots. This was reviewed with the patient, who verbalized understanding. AVS given.     Lori Nguyen RN ............   11/14/2017...4:11 PM

## 2017-11-14 NOTE — MR AVS SNAPSHOT
After Visit Summary   2017    Gogo Bourgeois    MRN: [id number]           Patient Information     Date Of Birth          1980        Visit Information        Provider Department      2017 3:15 PM FZ ALLERGY SHOTS [facility] [place]        Care Instructions    You are to be pre-medicating before all allergy injection appointments  with 20 mg of cetirizine the evening prior to shots, 20 mg the morning of shots and 20 mg the evening of shots.     Anaphylaxis Action Plan for Immunotherapy Patients    There is risk of systemic reactions when receiving immunotherapy injections. Local reactions such as a wheal (hive) smaller than a quarter, redness, swelling, and soreness are common. If the wheal is greater than the size of a half dollar (3.4 cm) please contact our clinic (numbers below), as we will need to adjust the dose that you receive at your next injection. Waiting until the next appointment to inform us of the reaction could increase the wait time that you experience, because your allergist will need to be contacted for new orders prior to giving the injection.  If you have symptoms not localized to the injection site, please follow the anaphylaxis plan, and contact our office to update after you have received emergency medical treatment. Please ask to speak to an Allergy RN with any questions or updates.     [facility]: [phone]  [facility]: [phone]  [facility]: [phone]  [place]: [phone]  [phone]                Follow-ups after your visit        Your next 10 appointments already scheduled     2017  1:15 PM CST   Nurse Only with FZ ALLERGY SHOTS   [place] [facility] ([facility] [place])    [address]  [place] [address] [phone]              Who to contact     If you have questions or need follow up information about today's clinic visit or your schedule please contact [facility]  "[place] directly at [phone].  Normal or non-critical lab and imaging results will be communicated to you by MyChart, letter or phone within 4 business days after the clinic has received the results. If you do not hear from us within 7 days, please contact the clinic through [employer] or phone. If you have a critical or abnormal lab result, we will notify you by phone as soon as possible.  Submit refill requests through [employer] or call your pharmacy and they will forward the refill request to us. Please allow 3 business days for your refill to be completed.          Additional Information About Your Visit        [employer] Information     [employer] lets you send messages to your doctor, view your test results, renew your prescriptions, schedule appointments and more. To sign up, go to [email or web address] . Click on \"Log in\" on the left side of the screen, which will take you to the Welcome page. Then click on \"Sign up Now\" on the right side of the page.     You will be asked to enter the access code listed below, as well as some personal information. Please follow the directions to create your username and password.     Your access code is: [email or web address]  Expires: 2017  2:48 PM     Your access code will  in 90 days. If you need help or a new code, please call your [place] clinic or [phone].        Care EveryWhere ID     This is your Care EveryWhere ID. This could be used by other organizations to access your [place] medical records  [id number]         Blood Pressure from Last 3 Encounters:   06/15/17 104/71   05/10/17 108/70   17 110/76    Weight from Last 3 Encounters:   06/15/17 62.8 kg (138 lb 6.4 oz)   05/10/17 61.7 kg (136 lb)   17 62.5 kg (137 lb 12.8 oz)              Today, you had the following     No orders found for display       Primary Care Provider Office Phone # Fax #    Paul Weber PA-C [phone]       [address] [place] [address]      "   Equal Access to Services     [facility]: Hadii madai randhawa sara Sosebleali, waaxda luqadaha, qaybta kaalmada mando, rema alcalamaddydianne negron . So [facility] [phone].    ATENCIÓN: Si gene britton, tiene a arteaga disposición servicios gratuitos de asistencia lingüística. Aprylame al [phone].    We comply with applicable federal civil rights laws and [place] laws. We do not discriminate on the basis of race, color, national origin, age, disability, sex, sexual orientation, or gender identity.            Thank you!     Thank you for choosing [facility]  for your care. Our goal is always to provide you with excellent care. Hearing back from our patients is one way we can continue to improve our services. Please take a few minutes to complete the written survey that you may receive in the mail after your visit with us. Thank you!             Your Updated Medication List - Protect others around you: Learn how to safely use, store and throw away your medicines at www.disposemymeds.org.          This list is accurate as of: 11/14/17  3:45 PM.  Always use your most recent med list.                   Brand Name Dispense Instructions for use Diagnosis    * ALLERGEN IMMUNOTHERAPY PRESCRIPTION     5 mL    Name of Mix: Mix #1  Mold Alternaria Tenuis GLY 1:10 w/v, HS  0.5 ml Hormodendrum Cladosporioides 1:10 w/v, HS 0.5 ml Diluent: HSA qs to 5ml    Chronic seasonal allergic rhinitis due to pollen, Allergic rhinitis due to mold       * ALLERGEN IMMUNOTHERAPY PRESCRIPTION     5 mL    Name of Mix: Mix #2  Tree  Julio Cesar, White  GLY 1:20 w/v, HS  0.5 ml Birch Mix GLY 1:20 w/v, HS  0.5 ml Boxelder-Maple  Mix BHR (Boxelder Hard Red) 1:20 w/v, HS  0.5 ml [place], Common GLY 1:20 w/v, HS  0.5 ml Elm, American GLY 1:20 w/v, HS  0.5 ml Hackberry GLY 1:20 w/v, HS 0.5 ml Hickory, Shagbark GLY 1:20 w/v, HS  0.5 ml [place] Mix GLY 1:20 w/v, HS 0.5 ml [place], Black GLY 1:20 w/v, HS 0.5 ml Diluent: HSA qs to 5ml     Chronic seasonal allergic rhinitis due to pollen, Allergic rhinitis due to mold       * ALLERGEN IMMUNOTHERAPY PRESCRIPTION     5 mL    Name of Mix: Mix #3  Weeds Nettle GLY 1:20 w/v, HS 0.5 ml Pigweed, Careless GLY 1:20 w/v, HS 0.5 ml Plantain, English GLY 1:20 w/v, HS 0.5 ml Diluent: HSA qs to 5ml    Chronic seasonal allergic rhinitis due to pollen, Allergic rhinitis due to mold       EPINEPHrine 0.3 MG/0.3ML injection 2-pack    AUVI-Q    2 mL    Inject 0.3 mLs (0.3 mg) into the muscle as needed for anaphylaxis    Need for desensitization to allergens       fluticasone 50 MCG/ACT spray    FLONASE    3 Bottle    Spray 1-2 sprays into both nostrils daily    Seasonal allergic rhinitis, unspecified allergic rhinitis trigger       MULTI-VITAMIN PO      Reported on 3/10/2017        olopatadine 0.1 % ophthalmic solution    PATANOL    5 mL    Place 1 drop into both eyes 2 times daily    Chronic allergic conjunctivitis       SOLUBLE FIBER/PROBIOTICS PO           * Notice:  This list has 3 medication(s) that are the same as other medications prescribed for you. Read the directions carefully, and ask your doctor or other care provider to review them with you.

## 2017-11-14 NOTE — PATIENT INSTRUCTIONS
You are to be pre-medicating before all allergy injection appointments  with 20 mg of cetirizine the evening prior to shots, 20 mg the morning of shots and 20 mg the evening of shots.     Anaphylaxis Action Plan for Immunotherapy Patients    There is risk of systemic reactions when receiving immunotherapy injections. Local reactions such as a wheal (hive) smaller than a quarter, redness, swelling, and soreness are common. If the wheal is greater than the size of a half dollar (3.4 cm) please contact our clinic (numbers below), as we will need to adjust the dose that you receive at your next injection. Waiting until the next appointment to inform us of the reaction could increase the wait time that you experience, because your allergist will need to be contacted for new orders prior to giving the injection.  If you have symptoms not localized to the injection site, please follow the anaphylaxis plan, and contact our office to update after you have received emergency medical treatment. Please ask to speak to an Allergy RN with any questions or updates.     [facility]: [phone]  [facility]: [phone]  [facility]: [phone]  [place]: [phone]  [phone]

## 2017-11-20 ENCOUNTER — OFFICE VISIT (OUTPATIENT)
Dept: FAMILY MEDICINE | Facility: CLINIC | Age: 37
End: 2017-11-20
Payer: COMMERCIAL

## 2017-11-20 VITALS
OXYGEN SATURATION: 98 % | BODY MASS INDEX: 26.94 KG/M2 | SYSTOLIC BLOOD PRESSURE: 109 MMHG | DIASTOLIC BLOOD PRESSURE: 70 MMHG | HEART RATE: 80 BPM | TEMPERATURE: 98.6 F | WEIGHT: 142.6 LBS

## 2017-11-20 DIAGNOSIS — R10.13 ABDOMINAL PAIN, EPIGASTRIC: ICD-10-CM

## 2017-11-20 DIAGNOSIS — Z98.84 S/P GASTRIC BYPASS: ICD-10-CM

## 2017-11-20 DIAGNOSIS — T85.848A PAIN FROM BREAST IMPLANT, INITIAL ENCOUNTER: Primary | ICD-10-CM

## 2017-11-20 PROCEDURE — 99214 OFFICE O/P EST MOD 30 MIN: CPT | Performed by: FAMILY MEDICINE

## 2017-11-20 NOTE — MR AVS SNAPSHOT
After Visit Summary   11/20/2017    Gogo Bourgeois    MRN: [id number]           Patient Information     Date Of Birth          1980        Visit Information        Provider Department      11/20/2017 3:00 PM Magalis Lema MD [facility]        Today's Diagnoses     Pain from breast implant, initial encounter    -  1    Abdominal pain, epigastric        S/P gastric bypass           Follow-ups after your visit        Follow-up notes from your care team     Return if symptoms worsen or fail to improve.      Your next 10 appointments already scheduled     Nov 21, 2017  1:15 PM CST   Nurse Only with FZ ALLERGY SHOTS   [facility]    [address] [facility] [facility] [id number] [phone]              Future tests that were ordered for you today     Open Future Orders        Priority Expected Expires Ordered    US Breast Right Complete 4 Quadrants Routine  11/21/2018 11/20/2017    MA Screen with Implants Right w/James Routine  11/20/2018 11/20/2017    H Pylori antigen stool Routine  12/20/2017 11/20/2017            Who to contact     Normal or non-critical lab and imaging results will be communicated to you by Babytreehart, letter or phone within 4 business days after the clinic has received the results. If you do not hear from us within 7 days, please contact the clinic through Babytreehart or phone. If you have a critical or abnormal lab result, we will notify you by phone as soon as possible.  Submit refill requests through [facility] or call your pharmacy and they will forward the refill request to us. Please allow 3 business days for your refill to be completed.          If you need to speak with a  for additional information , please call: [phone]             Additional Information About Your Visit        BabytreeharMagazino Information     [facility] lets you send messages to your doctor, view your test results, renew your prescriptions,  "schedule appointments and more. To sign up, go to [email or web address] . Click on \"Log in\" on the left side of the screen, which will take you to the Welcome page. Then click on \"Sign up Now\" on the right side of the page.     You will be asked to enter the access code listed below, as well as some personal information. Please follow the directions to create your username and password.     Your access code is: [email or web address]  Expires: 2017  2:48 PM     Your access code will  in 90 days. If you need help or a new code, please call your [place] clinic or [phone].        Care EveryWhere ID     This is your Care EveryWhere ID. This could be used by other organizations to access your [place] medical records  [id number]        Your Vitals Were     Pulse Temperature Pulse Oximetry Breastfeeding? BMI (Body Mass Index)       80 98.6  F (37  C) (Tympanic) 98% No 26.94 kg/m2        Blood Pressure from Last 3 Encounters:   17 109/70   06/15/17 104/71   05/10/17 108/70    Weight from Last 3 Encounters:   17 142 lb 9.6 oz (64.7 kg)   06/15/17 138 lb 6.4 oz (62.8 kg)   05/10/17 136 lb (61.7 kg)                 Today's Medication Changes          These changes are accurate as of: 17  3:39 PM.  If you have any questions, ask your nurse or doctor.               Start taking these medicines.        Dose/Directions    omeprazole 20 MG CR capsule   Commonly known as:  priLOSEC   Used for:  Abdominal pain, epigastric   Started by:  Magalis Lema MD        Dose:  20 mg   Take 1 capsule (20 mg) by mouth daily   Quantity:  30 capsule   Refills:  0            Where to get your medicines      These medications [id number] [place] - [address]  [address] [place] [address]     Phone:  [phone]     omeprazole 20 MG CR capsule                Primary Care Provider Office Phone # Fax #    Paul Weber PA-C [phone]       [address] [facility] [address] [place] [address]        Equal Access to Services     [facility]: Hadii aad ku hadsangitao Sosebleali, waaxda luqadaha, qaybta kaalmada deionlisettechiqui, rema alcalamaddydianne umaña. So [facility] [phone].    ATENCIÓN: Si habla español, tiene a arteaga disposición servicios gratuitos de asistencia lingüística. Aprylame al [phone].    We comply with applicable federal civil rights laws and [place] laws. We do not discriminate on the basis of race, color, national origin, age, disability, sex, sexual orientation, or gender identity.            Thank you!     Thank you for choosing [facility] (formerly [facility])  for your care. Our goal is always to provide you with excellent care. Hearing back from our patients is one way we can continue to improve our services. Please take a few minutes to complete the written survey that you may receive in the mail after your visit with us. Thank you!             Your Updated Medication List - Protect others around you: Learn how to safely use, store and throw away your medicines at www.disposemymeds.org.          This list is accurate as of: 11/20/17  3:39 PM.  Always use your most recent med list.                   Brand Name Dispense Instructions for use Diagnosis    * ALLERGEN IMMUNOTHERAPY PRESCRIPTION     5 mL    Name of Mix: Mix #1  Mold Alternaria Tenuis GLY 1:10 w/v, HS  0.5 ml Hormodendrum Cladosporioides 1:10 w/v, HS 0.5 ml Diluent: HSA qs to 5ml    Chronic seasonal allergic rhinitis due to pollen, Allergic rhinitis due to mold       * ALLERGEN IMMUNOTHERAPY PRESCRIPTION     5 mL    Name of Mix: Mix #2  Tree  [place], White  GLY 1:20 w/v, HS  0.5 ml Birch Mix GLY 1:20 w/v, HS  0.5 ml Boxelder-Maple  Mix BHR (Boxelder Hard Red) 1:20 w/v, HS  0.5 ml [place], Common GLY 1:20 w/v, HS  0.5 ml Elm, American GLY 1:20 w/v, HS  0.5 ml Hackberry GLY 1:20 w/v, HS 0.5 ml [place]ickory, Shagbark GLY 1:20 w/v, HS  0.5 ml [place] Mix GLY 1:20 w/v, HS 0.5 ml [place], Black GLY 1:20 w/v, HS 0.5 ml  Diluent: HSA qs to 5ml    Chronic seasonal allergic rhinitis due to pollen, Allergic rhinitis due to mold       * ALLERGEN IMMUNOTHERAPY PRESCRIPTION     5 mL    Name of Mix: Mix #3  Weeds Nettle GLY 1:20 w/v, HS 0.5 ml Pigweed, Careless GLY 1:20 w/v, HS 0.5 ml Plantain, English GLY 1:20 w/v, HS 0.5 ml Diluent: HSA qs to 5ml    Chronic seasonal allergic rhinitis due to pollen, Allergic rhinitis due to mold       EPINEPHrine 0.3 MG/0.3ML injection 2-pack    AUVI-Q    2 mL    Inject 0.3 mLs (0.3 mg) into the muscle as needed for anaphylaxis    Need for desensitization to allergens       fluticasone 50 MCG/ACT spray    FLONASE    3 Bottle    Spray 1-2 sprays into both nostrils daily    Seasonal allergic rhinitis, unspecified allergic rhinitis trigger       omeprazole 20 MG CR capsule    priLOSEC    30 capsule    Take 1 capsule (20 mg) by mouth daily    Abdominal pain, epigastric       * Notice:  This list has 3 medication(s) that are the same as other medications prescribed for you. Read the directions carefully, and ask your doctor or other care provider to review them with you.

## 2017-11-20 NOTE — PROGRESS NOTES
SUBJECTIVE:   Gogo Bourgeois is a 37 year old female who presents to clinic today for the following health issues:      Breast Pain- Right  Has noticed pain since x 3 days.    Describing the pain as a soreness.    Pain has now radiated to right side/  Upper back, feeling pressure.    Denies any breast mass but has noticed recent pimple on that breast.    Has had implants since 2012; procedure was done while in [place].    Denies a personal or family history of breast cancer.    Abdominal Pains x2 weeks  Feeling nausea after eating meals.  A lot of noises coming from stomach with gas.    Reports she had  Gastric bypass procedure done in 2001 while in [place].   Denies any changes in bowels, but has vomited 1 or 2x from this.  No OTC meds tried.     Problem list and histories reviewed & adjusted, as indicated.  Additional history: as documented    Patient Active Problem List   Diagnosis     S/P LEEP of cervix     Traction alopecia     S/P gastric bypass     Dyshidrotic eczema     Seasonal allergic rhinitis due to pollen     Allergic rhinitis due to mold     Allergic conjunctivitis, bilateral     Pollen-food allergy syndrome, initial encounter     Past Surgical History:   Procedure Laterality Date     C MAMMOGRAM, W/BILAT. IMPLANTS  2012    in [place]     COSMETIC ABDOMINOPLASTY  2012    plus plast of arms and inner thighs     HC REMOVAL GALLBLADDER  12/2001     LAPAROSCOPIC BYPASS GASTRIC  8/23/11    Sleeve done in [place]     LEEP TX, CERVICAL  2002     TUBAL LIGATION  2014       Social History   Substance Use Topics     Smoking status: Never Smoker     Smokeless tobacco: Never Used      Comment: Nonsmoking household     Alcohol use No     Family History   Problem Relation Age of Onset     Hypertension Maternal Grandmother      Respiratory Paternal Grandmother      smoker     Alcohol/Drug Paternal Grandfather      alcohlism, cirrhosis of liver         Current Outpatient Prescriptions   Medication Sig Dispense  Refill     omeprazole (PRILOSEC) 20 MG CR capsule Take 1 capsule (20 mg) by mouth daily 30 capsule 0     ORDER FOR ALLERGEN IMMUNOTHERAPY Name of Mix: Mix #1  Mold  Alternaria Tenuis GLY 1:10 w/v, HS  0.5 ml  Hormodendrum Cladosporioides 1:10 w/v, HS 0.5 ml  Diluent: HSA qs to 5ml 5 mL PRN     ORDER FOR ALLERGEN IMMUNOTHERAPY Name of Mix: Mix #2  Tree   Julio [place]esar, White  GLY 1:20 w/v, HS  0.5 ml  Birch Mix GLY 1:20 w/v, HS  0.5 ml  Boxelder-Maple  Mix BHR (Boxelder Hard Red) 1:20 w/v, HS  0.5 ml  [place], Common GLY 1:20 w/v, HS  0.5 ml  Elm, American GLY 1:20 w/v, HS  0.5 ml  Hackberry GLY 1:20 w/v, HS 0.5 ml  [place], Shagbark GLY 1:20 w/v, HS  0.5 ml  [place] Mix GLY 1:20 w/v, HS 0.5 ml  [place], Black GLY 1:20 w/v, HS 0.5 ml  Diluent: HSA qs to 5ml 5 mL PRN     ORDER FOR ALLERGEN IMMUNOTHERAPY Name of Mix: Mix #3  Weeds  Nettle GLY 1:20 w/v, HS 0.5 ml  Pigweed, Careless GLY 1:20 w/v, HS 0.5 ml  Plantain, English GLY 1:20 w/v, HS 0.5 ml  Diluent: HSA qs to 5ml 5 mL PRN     fluticasone (FLONASE) 50 MCG/ACT spray Spray 1-2 sprays into both nostrils daily 3 Bottle 11     EPINEPHrine (AUVI-Q) 0.3 MG/0.3ML injection Inject 0.3 mLs (0.3 mg) into the muscle as needed for anaphylaxis (Patient not taking: Reported on 11/20/2017) 2 mL 2     Allergies   Allergen Reactions     Nkda [No Known Drug Allergies]      Seasonal Allergies          Reviewed and updated as needed this visit by clinical staff  Tobacco  Meds       Reviewed and updated as needed this visit by Provider         ROS:  Constitutional, HEENT, cardiovascular, pulmonary, gi and gu systems are negative, except as otherwise noted.      OBJECTIVE:   /70  Pulse 80  Temp 98.6  F (37  C) (Tympanic)  Wt 142 lb 9.6 oz (64.7 kg)  SpO2 98%  Breastfeeding? No  BMI 26.94 kg/m2  Body mass index is 26.94 kg/(m^2).  GENERAL: healthy, alert and no distress  RESP: lungs clear to auscultation - no rales, rhonchi or wheezes  CV: regular rate and rhythm, normal S1 S2,  no S3 or S4, no murmur, click or rub, no peripheral edema and peripheral pulses strong  BREAST:  Bilateral breast augmentation with tenderness of the right breast but no overlying skin changes. No nipple discharge and no palpable axillary masses or adenopathy  ABDOMEN: soft, epigastric tenderness, no hepatosplenomegaly, no masses and bowel sounds normal    Diagnostic Test Results:  Imaging ordered, results are pending    ASSESSMENT/PLAN:     Gogo was seen today for breast pain and gi problem.    Diagnoses and all orders for this visit:    Pain from breast implant, initial encounter  -     US Breast Right Complete 4 Quadrants; Future  -     MA Diagnostic with Implants Right w/James; Future  -     Tylenol/Ibuprofen as needed for pain    Abdominal pain, epigastric S/P gastric bypass  -     H Pylori antigen stool; Future  -     Trial of PPI: omeprazole (PRILOSEC) 20 MG CR capsule; Take 1 capsule (20 mg) by mouth daily       Follow up if symptoms fail to improve or worsen.      The patient was in agreement with the plan today and had no questions or concerns prior to leaving the clinic.        Magalis Lema MD  [facility]

## 2017-11-21 ENCOUNTER — ALLIED HEALTH/NURSE VISIT (OUTPATIENT)
Dept: ALLERGY | Facility: CLINIC | Age: 37
End: 2017-11-21
Payer: COMMERCIAL

## 2017-11-21 DIAGNOSIS — J30.1 ALLERGIC RHINITIS DUE TO POLLEN: Primary | ICD-10-CM

## 2017-11-21 PROCEDURE — 95117 IMMUNOTHERAPY INJECTIONS: CPT

## 2017-11-21 PROCEDURE — 99207 ZZC NO CHARGE LOS: CPT

## 2017-11-21 NOTE — MR AVS SNAPSHOT
"              After Visit Summary   11/21/2017    Gogo Bourgeois    MRN: [id number]           Patient Information     Date Of Birth          1980        Visit Information        Provider Department      11/21/2017 1:15 PM FZ ALLERGY SHOTS [facility]        Care Instructions    Take two tablets of cetirizine (20 mg total) the night before injections, the morning of injections and the evening of allergy injections.                 Follow-ups after your visit        Your next 10 appointments already scheduled     Nov 21, 2017  1:15 PM CST   Nurse Only with FZ ALLERGY SHOTS   [facility] ([facility]    [address] [place] [id number]   [phone]            Dec 01, 2017 10:30 AM CST   MA DIAGNOSTIC WITH IMPLANTS BILATERAL with MGMA2, MG MA TECH   [facility] ([facility])    [address] [place] [id number] [phone]           Do not use any powder, lotion or deodorant under your arms or on your breast. If you do, we will ask you to remove it before your exam.  Wear comfortable, two-piece clothing.  If you have any allergies, tell your care team.  Bring any previous mammograms from other facilities or have them mailed to the breast center.  Three-dimensional (3D) mammograms are available at [place] locations in [facility], [facility], [facility], and [place]. [facility] locations include [facility] in [place].   Benefits of 3D mammograms include: - Improved rate of cancer detection - Decreases your chance of having to go back for more tests, which means fewer: - \"False-positive\" results (This means that there is an abnormal area but it isn't cancer.) - Invasive testing procedures, such as a biopsy or surgery - Can provide clearer images of the breast if you have dense breast tissue. 3D mammography is an optional exam that anyone can have " with a 2D mammogram. It doesn't replace or take the place of a 2D mammogram. 2D mammograms remain an effective screening test for all women.  Not all insurance companies cover the cost of a 3D mammogram. Check with your insurance.            Dec 01, 2017 10:50 AM CST   US BREAST RIGHT CMPL 4 QUAD with MGMUS1, MG  [employer]   [facility] ([facility])    [address] [place] [id number] [phone]           Please bring a list of your medicines (including vitamins, minerals and over-the-counter drugs). Also, tell your doctor about any allergies you may have. Wear comfortable clothes and leave your valuables at home.  You do not need to do anything special to prepare for your exam.  Please call the Imaging Department at your exam site with any questions.              Future tests that were ordered for you today     Open Future Orders        Priority Expected Expires Ordered    MA Diagnostic w Implants Right Routine  11/21/2018 11/21/2017    MA Diagnostic w Implants Bilateral Routine  11/20/2018 11/20/2017    US Breast Right Complete 4 Quadrants Routine  11/21/2018 11/20/2017    H Pylori antigen stool Routine  12/20/2017 11/20/2017            Who to contact     If you have questions or need follow up information about today's clinic visit or your schedule please contact [facility] directly at [phone].  Normal or non-critical lab and imaging results will be communicated to you by MyChart, letter or phone within 4 business days after the clinic has received the results. If you do not hear from us within 7 days, please contact the clinic through MyChart or phone. If you have a critical or abnormal lab result, we will notify you by phone as soon as possible.  Submit refill requests through [employer] or call your pharmacy and they will forward the refill request to us. Please allow 3 business days for your refill to be completed.          Additional  "Information About Your Visit        MyChart Information     [employer] lets you send messages to your doctor, view your test results, renew your prescriptions, schedule appointments and more. To sign up, go to [email or web address] . Click on \"Log in\" on the left side of the screen, which will take you to the Welcome page. Then click on \"Sign up Now\" on the right side of the page.     You will be asked to enter the access code listed below, as well as some personal information. Please follow the directions to create your username and password.     Your access code is: [email or web address]  Expires: 2017  2:48 PM     Your access code will  in 90 days. If you need help or a new code, please call your [place] clinic or [phone].        Care EveryWhere ID     This is your Care EveryWhere ID. This could be used by other organizations to access your [place] medical records  [id number]         Blood Pressure from Last 3 Encounters:   17 109/70   06/15/17 104/71   05/10/17 108/70    Weight from Last 3 Encounters:   17 64.7 kg (142 lb 9.6 oz)   06/15/17 62.8 kg (138 lb 6.4 oz)   05/10/17 61.7 kg (136 lb)              Today, you had the following     No orders found for display       Primary Care Provider Office Phone # Fax #    Paul Weber PA-C [phone]       [address] [facility] [address]        Equal Access to Services     [facility]: Hadii aad ku hadasho Soomaali, waaxda luqadaha, qaybta kaalmada adeegyachiqui, rema negron . So [facility] [phone].    ATENCIÓN: Si habla español, tiene a arteaga disposición servicios gratuitos de asistencia lingüística. Llame al [phone].    We comply with applicable federal civil rights laws and [place] laws. We do not discriminate on the basis of race, color, national origin, age, disability, sex, sexual orientation, or gender identity.            Thank you!     Thank you for choosing [facility] "  for your care. Our goal is always to provide you with excellent care. Hearing back from our patients is one way we can continue to improve our services. Please take a few minutes to complete the written survey that you may receive in the mail after your visit with us. Thank you!             Your Updated Medication List - Protect others around you: Learn how to safely use, store and throw away your medicines at www.disposemymeds.org.          This list is accurate as of: 11/21/17 12:57 PM.  Always use your most recent med list.                   Brand Name Dispense Instructions for use Diagnosis    * ALLERGEN IMMUNOTHERAPY PRESCRIPTION     5 mL    Name of Mix: Mix #1  Mold Alternaria Tenuis GLY 1:10 w/v, HS  0.5 ml Hormodendrum Cladosporioides 1:10 w/v, HS 0.5 ml Diluent: HSA qs to 5ml    Chronic seasonal allergic rhinitis due to pollen, Allergic rhinitis due to mold       * ALLERGEN IMMUNOTHERAPY PRESCRIPTION     5 mL    Name of Mix: Mix #2  Tree  Julio Cesar, White  GLY 1:20 w/v, HS  0.5 ml Birch Mix GLY 1:20 w/v, HS  0.5 ml Boxelder-Maple  Mix BHR (Boxelder Hard Red) 1:20 w/v, HS  0.5 ml [place], Common GLY 1:20 w/v, HS  0.5 ml Elm, American GLY 1:20 w/v, HS  0.5 ml Hackberry GLY 1:20 w/v, HS 0.5 ml [place]ick[place], Shagbark GLY 1:20 w/v, HS  0.5 ml [place] Mix GLY 1:20 w/v, HS 0.5 ml [place], Black GLY 1:20 w/v, HS 0.5 ml Diluent: HSA qs to 5ml    Chronic seasonal allergic rhinitis due to pollen, Allergic rhinitis due to mold       * ALLERGEN IMMUNOTHERAPY PRESCRIPTION     5 mL    Name of Mix: Mix #3  Weeds Nettle GLY 1:20 w/v, HS 0.5 ml Pigweed, Careless GLY 1:20 w/v, HS 0.5 ml Plantain, English GLY 1:20 w/v, HS 0.5 ml Diluent: HSA qs to 5ml    Chronic seasonal allergic rhinitis due to pollen, Allergic rhinitis due to mold       EPINEPHrine 0.3 MG/0.3ML injection 2-pack    AUVI-Q    2 mL    Inject 0.3 mLs (0.3 mg) into the muscle as needed for anaphylaxis    Need for desensitization to allergens       fluticasone 50 MCG/ACT  spray    FLONASE    3 Bottle    Spray 1-2 sprays into both nostrils daily    Seasonal allergic rhinitis, unspecified allergic rhinitis trigger       omeprazole 20 MG CR capsule    priLOSEC    30 capsule    Take 1 capsule (20 mg) by mouth daily    Abdominal pain, epigastric       * Notice:  This list has 3 medication(s) that are the same as other medications prescribed for you. Read the directions carefully, and ask your doctor or other care provider to review them with you.

## 2017-11-21 NOTE — PATIENT INSTRUCTIONS
Take two tablets of cetirizine (20 mg total) the night before injections, the morning of injections and the evening of allergy injections.

## 2017-11-21 NOTE — PROGRESS NOTES
Patient presented after waiting 30 minutes with no reaction to allergy injections. Discharged from clinic.  Lori Nguyen RN ............   11/21/2017...1:32 PM

## 2017-11-27 ENCOUNTER — ALLIED HEALTH/NURSE VISIT (OUTPATIENT)
Dept: NURSING | Facility: CLINIC | Age: 37
End: 2017-11-27
Payer: COMMERCIAL

## 2017-11-27 DIAGNOSIS — R10.13 ABDOMINAL PAIN, EPIGASTRIC: ICD-10-CM

## 2017-11-27 DIAGNOSIS — Z23 NEED FOR PROPHYLACTIC VACCINATION AND INOCULATION AGAINST INFLUENZA: Primary | ICD-10-CM

## 2017-11-27 PROCEDURE — 87338 HPYLORI STOOL AG IA: CPT | Performed by: FAMILY MEDICINE

## 2017-11-27 PROCEDURE — 90686 IIV4 VACC NO PRSV 0.5 ML IM: CPT

## 2017-11-27 PROCEDURE — 99207 ZZC NO CHARGE NURSE ONLY: CPT

## 2017-11-27 PROCEDURE — 90471 IMMUNIZATION ADMIN: CPT

## 2017-11-27 NOTE — NURSING NOTE
Prior to injection verified patient identity using patient's name and date of birth.  Tamiko Aggarwal MA    Per orders of Dr. Cruz, injection of Flu given by Tamiko Aggarwal CMA. Patient instructed to remain in clinic for 15 minutes afterwards, and to report any adverse reaction to me immediately.  Tamiko Aggarwal MA

## 2017-11-27 NOTE — MR AVS SNAPSHOT
"              After Visit Summary   11/27/2017    Gogo Bourgeois    MRN: [id number]           Patient Information     Date Of Birth          1980        Visit Information        Provider Department      11/27/2017 9:50 AM CARE COORDINATOR CP [facility]        Today's Diagnoses     Need for prophylactic vaccination and inoculation against influenza    -  1       Follow-ups after your visit        Your next 10 appointments already scheduled     Dec 01, 2017 10:30 AM CST   MA DIAGNOSTIC WITH IMPLANTS BILATERAL with MGMA2, MG MA TECH   [facility] ([facility]    [address] [place] [id number] [phone]           Do not use any powder, lotion or deodorant under your arms or on your breast. If you do, we will ask you to remove it before your exam.  Wear comfortable, two-piece clothing.  If you have any allergies, tell your care team.  Bring any previous mammograms from other facilities or have them mailed to the breast center.  Three-dimensional (3D) mammograms are available at [facility] locations in [facility], [facility], and [place]. [facility] locations include [facility] in [place].   Benefits of 3D mammograms include: - Improved rate of cancer detection - Decreases your chance of having to go back for more tests, which means fewer: - \"False-positive\" results (This means that there is an abnormal area but it isn't cancer.) - Invasive testing procedures, such as a biopsy or surgery - Can provide clearer images of the breast if you have dense breast tissue. 3D mammography is an optional exam that anyone can have with a 2D mammogram. It doesn't replace or take the place of a 2D mammogram. 2D mammograms remain an effective screening test for all women.  Not all insurance companies cover the cost of a 3D mammogram. Check with your insurance.            Dec " "01, 2017 10:50 AM CST   US BREAST RIGHT CMPL 4 QUAD with MGMUS1, MG US TECH   [facility] ([facility]    [address] [address] [place] [address] [phone]           Please bring a list of your medicines (including vitamins, minerals and over-the-counter drugs). Also, tell your doctor about any allergies you may have. Wear comfortable clothes and leave your valuables at home.  You do not need to do anything special to prepare for your exam.  Please call the Imaging Department at your exam site with any questions.              Who to contact     If you have questions or need follow up information about today's clinic visit or your schedule please contact [facility] directly at [phone].  Normal or non-critical lab and imaging results will be communicated to you by MyChart, letter or phone within 4 business days after the clinic has received the results. If you do not hear from us within 7 days, please contact the clinic through [employer] or phone. If you have a critical or abnormal lab result, we will notify you by phone as soon as possible.  Submit refill requests through [employer] or call your pharmacy and they will forward the refill request to us. Please allow 3 business days for your refill to be completed.          Additional Information About Your Visit        [employer] Information     [employer] lets you send messages to your doctor, view your test results, renew your prescriptions, schedule appointments and more. To sign up, go to [email or web address] . Click on \"Log in\" on the left side of the screen, which will take you to the Welcome page. Then click on \"Sign up Now\" on the right side of the page.     You will be asked to enter the access code listed below, as well as some personal information. Please follow the directions to create your username and password.     Your access code is: [email or web address]  Expires: 12/19/2017  2:48 PM     Your " access code will  in 90 days. If you need help or a new code, please call your [place] clinic or [phone].        Care EveryWhere ID     This is your Care EveryWhere ID. This could be used by other organizations to access your [place] medical records  [id number]         Blood Pressure from Last 3 Encounters:   17 109/70   06/15/17 104/71   05/10/17 108/70    Weight from Last 3 Encounters:   17 142 lb 9.6 oz (64.7 kg)   06/15/17 138 lb 6.4 oz (62.8 kg)   05/10/17 136 lb (61.7 kg)              We Performed the Following     FLU VAC, SPLIT VIRUS IM > 3 YO (QUADRIVALENT) [33028]     Vaccine Administration, Initial [67970]        Primary Care Provider Office Phone # Fax #    Paul Kimberlyn Weber PA-C [phone] [address] [place] [address]        Equal Access to Services     [facility]: Hadii aad ku hadasho Soomaali, waaxda luqadaha, qaybta kaalmada adeegyada, waxay idiin haydaronn micaela negron . So [facility] [phone].    ATENCIÓN: Si habla español, tiene a arteaga disposición servicios gratuitos de asistencia lingüística. [facility] [phone].    We comply with applicable federal civil rights laws and [place] laws. We do not discriminate on the basis of race, color, national origin, age, disability, sex, sexual orientation, or gender identity.            Thank you!     Thank you for choosing [facility]  for your care. Our goal is always to provide you with excellent care. Hearing back from our patients is one way we can continue to improve our services. Please take a few minutes to complete the written survey that you may receive in the mail after your visit with us. Thank you!             Your Updated Medication List - Protect others around you: Learn how to safely use, store and throw away your medicines at www.disposemymeds.org.          This list is accurate as of: 17  3:27 PM.  Always use your most recent med list.                    Brand Name Dispense Instructions for use Diagnosis    * ALLERGEN IMMUNOTHERAPY PRESCRIPTION     5 mL    Name of Mix: Mix #1  Mold Alternaria Tenuis GLY 1:10 w/v, HS  0.5 ml Hormodendrum Cladosporioides 1:10 w/v, HS 0.5 ml Diluent: HSA qs to 5ml    Chronic seasonal allergic rhinitis due to pollen, Allergic rhinitis due to mold       * ALLERGEN IMMUNOTHERAPY PRESCRIPTION     5 mL    Name of Mix: Mix #2  Tree  Julio Cesar, White  GLY 1:20 w/v, HS  0.5 ml Birch Mix GLY 1:20 w/v, HS  0.5 ml Boxelder-Maple  Mix BHR (Boxelder Hard Red) 1:20 w/v, HS  0.5 ml [place], Common GLY 1:20 w/v, HS  0.5 ml Elm, American GLY 1:20 w/v, HS  0.5 ml Hackberry GLY 1:20 w/v, HS 0.5 ml Hickory, Shagbark GLY 1:20 w/v, HS  0.5 ml [place] Mix GLY 1:20 w/v, HS 0.5 ml [place], Black GLY 1:20 w/v, HS 0.5 ml Diluent: HSA qs to 5ml    Chronic seasonal allergic rhinitis due to pollen, Allergic rhinitis due to mold       * ALLERGEN IMMUNOTHERAPY PRESCRIPTION     5 mL    Name of Mix: Mix #3  Weeds Nettle GLY 1:20 w/v, HS 0.5 ml Pigweed, Careless GLY 1:20 w/v, HS 0.5 ml Plantain, English GLY 1:20 w/v, HS 0.5 ml Diluent: HSA qs to 5ml    Chronic seasonal allergic rhinitis due to pollen, Allergic rhinitis due to mold       EPINEPHrine 0.3 MG/0.3ML injection 2-pack    AUVI-Q    2 mL    Inject 0.3 mLs (0.3 mg) into the muscle as needed for anaphylaxis    Need for desensitization to allergens       fluticasone 50 MCG/ACT spray    FLONASE    3 Bottle    Spray 1-2 sprays into both nostrils daily    Seasonal allergic rhinitis, unspecified allergic rhinitis trigger       omeprazole 20 MG CR capsule    priLOSEC    30 capsule    Take 1 capsule (20 mg) by mouth daily    Abdominal pain, epigastric       * Notice:  This list has 3 medication(s) that are the same as other medications prescribed for you. Read the directions carefully, and ask your doctor or other care provider to review them with you.

## 2017-11-27 NOTE — PROGRESS NOTES

## 2017-11-28 ENCOUNTER — TRANSFERRED RECORDS (OUTPATIENT)
Dept: HEALTH INFORMATION MANAGEMENT | Facility: CLINIC | Age: 37
End: 2017-11-28

## 2017-11-29 LAB
H PYLORI AG STL QL IA: NORMAL
SPECIMEN SOURCE: NORMAL

## 2017-12-01 ENCOUNTER — RADIANT APPOINTMENT (OUTPATIENT)
Dept: ULTRASOUND IMAGING | Facility: CLINIC | Age: 37
End: 2017-12-01
Attending: FAMILY MEDICINE
Payer: COMMERCIAL

## 2017-12-01 ENCOUNTER — RADIANT APPOINTMENT (OUTPATIENT)
Dept: MAMMOGRAPHY | Facility: CLINIC | Age: 37
End: 2017-12-01
Attending: FAMILY MEDICINE
Payer: COMMERCIAL

## 2017-12-01 DIAGNOSIS — T85.848A PAIN FROM BREAST IMPLANT, INITIAL ENCOUNTER: ICD-10-CM

## 2017-12-01 PROCEDURE — G0204 DX MAMMO INCL CAD BI: HCPCS | Performed by: RADIOLOGY

## 2017-12-01 PROCEDURE — 76642 ULTRASOUND BREAST LIMITED: CPT | Mod: RT | Performed by: RADIOLOGY

## 2017-12-01 PROCEDURE — G0279 TOMOSYNTHESIS, MAMMO: HCPCS | Performed by: RADIOLOGY

## 2017-12-05 ENCOUNTER — ALLIED HEALTH/NURSE VISIT (OUTPATIENT)
Dept: ALLERGY | Facility: CLINIC | Age: 37
End: 2017-12-05
Payer: COMMERCIAL

## 2017-12-05 DIAGNOSIS — J30.1 ALLERGIC RHINITIS DUE TO POLLEN: Primary | ICD-10-CM

## 2017-12-05 PROCEDURE — 95117 IMMUNOTHERAPY INJECTIONS: CPT

## 2017-12-05 PROCEDURE — 99207 ZZC NO CHARGE LOS: CPT

## 2017-12-05 NOTE — PROGRESS NOTES
Patient presented after waiting 30 minutes with no reaction to allergy injections. Discharged from clinic.  Lori Nguyen RN ............   12/5/2017...1:51 PM

## 2017-12-05 NOTE — MR AVS SNAPSHOT
After Visit Summary   12/5/2017    Gogo Bourgeois    MRN: [id number]           Patient Information     Date Of Birth          1980        Visit Information        Provider Department      12/5/2017 1:15 PM FZ ALLERGY SHOTS [facility] [place]        Today's Diagnoses     Allergic rhinitis due to pollen    -  1       Follow-ups after your visit        Your next 10 appointments already scheduled     Dec 12, 2017  1:15 PM CST   Nurse Only with FZ ALLERGY SHOTS   [facility] [place] ([facility] [place])    [address]  [place] [address]   [phone]            Dec 19, 2017  1:15 PM CST   Nurse Only with FZ ALLERGY SHOTS   [facility] [place] ([facility] [place])    [address]  [place] [address]   [phone]            Dec 26, 2017  1:15 PM CST   Nurse Only with FZ ALLERGY SHOTS   [facility] [place] ([facility] [place])    [address]  [place] [address]   [phone]            Jan 02, 2018  1:30 PM CST   Nurse Only with FZ ALLERGY SHOTS   [facility] [place] ([facility] [place])    [address]  [place] [address]   [phone]            Jan 09, 2018  3:40 PM CST   Nurse Only with FZ ALLERGY SHOTS   [facility] [place] ([facility] [place])    [address]  [place] [address]   [phone]            Jan 16, 2018 12:50 PM CST   Nurse Only with FZ ALLERGY SHOTS   [facility] [place] ([facility] [place])    [address]  [place] [address]   [phone]            Jan 23, 2018  3:50 PM CST   Nurse Only with FZ ALLERGY SHOTS   [facility] [place] ([facility] [place])    [address]  [place] [address]   [phone]            Jan 30, 2018 12:30 PM CST   Nurse Only with FZ ALLERGY SHOTS   [facility] [place] ([facility] [place])    [address]  [place] [address]   [phone]              Who to  "contact     If you have questions or need follow up information about today's clinic visit or your schedule please contact [facility] directly at [phone].  Normal or non-critical lab and imaging results will be communicated to you by MyChart, letter or phone within 4 business days after the clinic has received the results. If you do not hear from us within 7 days, please contact the clinic through [employer] or phone. If you have a critical or abnormal lab result, we will notify you by phone as soon as possible.  Submit refill requests through [employer] or call your pharmacy and they will forward the refill request to us. Please allow 3 business days for your refill to be completed.          Additional Information About Your Visit        [employer] Information     [employer] lets you send messages to your doctor, view your test results, renew your prescriptions, schedule appointments and more. To sign up, go to [email or web address] . Click on \"Log in\" on the left side of the screen, which will take you to the Welcome page. Then click on \"Sign up Now\" on the right side of the page.     You will be asked to enter the access code listed below, as well as some personal information. Please follow the directions to create your username and password.     Your access code is: [email or web address]  Expires: 2017  2:48 PM     Your access code will  in 90 days. If you need help or a new code, please call your [place] clinic or [phone].        Care EveryWhere ID     This is your Care EveryWhere ID. This could be used by other organizations to access your [place] medical records  [id number]         Blood Pressure from Last 3 Encounters:   17 109/70   06/15/17 104/71   05/10/17 108/70    Weight from Last 3 Encounters:   17 64.7 kg (142 lb 9.6 oz)   06/15/17 62.8 kg (138 lb 6.4 oz)   05/10/17 61.7 kg (136 lb)              We Performed the Following     Allergy Shot: Two or more injections        " Primary Care Provider Office Phone # Fax #    Paul Weber PA-C [phone]       [address] [place] [address]        Equal Access to Services     ZENAIDA CARR : Hadii aad ku hadsangitao Soomaali, waaxda luqadaha, qaybta kaalmada adeegyada, waxay idiin haydaronn adeakanksha roman laJulychente umaña. So [facility] [phone].    ATENCIÓN: Si habla español, tiene a arteaga disposición servicios gratuitos de asistencia lingüística. Llame al [phone].    We comply with applicable federal civil rights laws and [place] laws. We do not discriminate on the basis of race, color, national origin, age, disability, sex, sexual orientation, or gender identity.            Thank you!     Thank you for choosing [facility]  for your care. Our goal is always to provide you with excellent care. Hearing back from our patients is one way we can continue to improve our services. Please take a few minutes to complete the written survey that you may receive in the mail after your visit with us. Thank you!             Your Updated Medication List - Protect others around you: Learn how to safely use, store and throw away your medicines at www.disposemymeds.org.          This list is accurate as of: 12/5/17  1:51 PM.  Always use your most recent med list.                   Brand Name Dispense Instructions for use Diagnosis    * ALLERGEN IMMUNOTHERAPY PRESCRIPTION     5 mL    Name of Mix: Mix #1  Mold Alternaria Tenuis GLY 1:10 w/v, HS  0.5 ml Hormodendrum Cladosporioides 1:10 w/v, HS 0.5 ml Diluent: HSA qs to 5ml    Chronic seasonal allergic rhinitis due to pollen, Allergic rhinitis due to mold       * ALLERGEN IMMUNOTHERAPY PRESCRIPTION     5 mL    Name of Mix: Mix #2  Tree  [place], White  GLY 1:20 w/v, HS  0.5 ml Birch Mix GLY 1:20 w/v, HS  0.5 ml Boxelder-Maple  Mix BHR (Boxelder Hard Red) 1:20 w/v, HS  0.5 ml [place], Common GLY 1:20 w/v, HS  0.5 ml Elm, American GLY 1:20 w/v, HS  0.5 ml Hackberry GLY 1:20 w/v, HS 0.5 ml  [place], Shagbark GLY 1:20 w/v, HS  0.5 ml [place] Mix GLY 1:20 w/v, HS 0.5 ml [place], Black GLY 1:20 w/v, HS 0.5 ml Diluent: HSA qs to 5ml    Chronic seasonal allergic rhinitis due to pollen, Allergic rhinitis due to mold       * ALLERGEN IMMUNOTHERAPY PRESCRIPTION     5 mL    Name of Mix: Mix #3  Weeds Nettle GLY 1:20 w/v, HS 0.5 ml Pigweed, Careless GLY 1:20 w/v, HS 0.5 ml Plantain, English GLY 1:20 w/v, HS 0.5 ml Diluent: HSA qs to 5ml    Chronic seasonal allergic rhinitis due to pollen, Allergic rhinitis due to mold       EPINEPHrine 0.3 MG/0.3ML injection 2-pack    AUVI-Q    2 mL    Inject 0.3 mLs (0.3 mg) into the muscle as needed for anaphylaxis    Need for desensitization to allergens       fluticasone 50 MCG/ACT spray    FLONASE    3 Bottle    Spray 1-2 sprays into both nostrils daily    Seasonal allergic rhinitis, unspecified allergic rhinitis trigger       omeprazole 20 MG CR capsule    priLOSEC    30 capsule    Take 1 capsule (20 mg) by mouth daily    Abdominal pain, epigastric       * Notice:  This list has 3 medication(s) that are the same as other medications prescribed for you. Read the directions carefully, and ask your doctor or other care provider to review them with you.

## 2017-12-19 ENCOUNTER — ALLIED HEALTH/NURSE VISIT (OUTPATIENT)
Dept: ALLERGY | Facility: CLINIC | Age: 37
End: 2017-12-19
Payer: COMMERCIAL

## 2017-12-19 DIAGNOSIS — J30.1 ALLERGIC RHINITIS DUE TO POLLEN: Primary | ICD-10-CM

## 2017-12-19 PROCEDURE — 95117 IMMUNOTHERAPY INJECTIONS: CPT

## 2017-12-19 PROCEDURE — 99207 ZZC NO CHARGE LOS: CPT

## 2017-12-19 NOTE — MR AVS SNAPSHOT
After Visit Summary   12/19/2017    Gogo Bourgeois    MRN: [id number]           Patient Information     Date Of Birth          1980        Visit Information        Provider Department      12/19/2017 1:15 PM FZ ALLERGY SHOTS [facility] [place]        Today's Diagnoses     Allergic rhinitis due to pollen    -  1       Follow-ups after your visit        Your next 10 appointments already scheduled     Dec 26, 2017  1:15 PM CST   Nurse Only with FZ ALLERGY SHOTS   [facility] [place] ([facility])    [address]  [place] [address]   [phone]            Jan 02, 2018  1:30 PM CST   Nurse Only with FZ ALLERGY SHOTS   [facility] [place] ([facility] [place])    [address]  [place] [address]   [phone]            Jan 09, 2018  3:40 PM CST   Nurse Only with FZ ALLERGY SHOTS   [facility] [place] ([facility] [place])    [address]  [place] [address]   [phone]            Jan 16, 2018 12:50 PM CST   Nurse Only with FZ ALLERGY SHOTS   [facility] [place] ([facility] [place])    [address]  [place] [address]   [phone]            Jan 23, 2018  3:50 PM CST   Nurse Only with FZ ALLERGY SHOTS   [facility] [place] ([facility] [place])    [address]  [place] [address]   [phone]            Jan 30, 2018 12:30 PM CST   Nurse Only with FZ ALLERGY SHOTS   [facility] [place] ([facility] [place])    [address]  [place] [address]   [phone]              Who to contact     If you have questions or need follow up information about today's clinic visit or your schedule please contact [facility] directly at [phone].  Normal or non-critical lab and imaging results will be communicated to you by MyChart, letter or phone within 4 business days after the clinic has received the results. If you do not hear from us within 7  "days, please contact the clinic through [employer] or phone. If you have a critical or abnormal lab result, we will notify you by phone as soon as possible.  Submit refill requests through [employer] or call your pharmacy and they will forward the refill request to us. Please allow 3 business days for your refill to be completed.          Additional Information About Your Visit        [employer] Information     [employer] lets you send messages to your doctor, view your test results, renew your prescriptions, schedule appointments and more. To sign up, go to [email or web address] . Click on \"Log in\" on the left side of the screen, which will take you to the Welcome page. Then click on \"Sign up Now\" on the right side of the page.     You will be asked to enter the access code listed below, as well as some personal information. Please follow the directions to create your username and password.     Your access code is: [email or web address]  Expires: 2017  2:48 PM     Your access code will  in 90 days. If you need help or a new code, please call your [place] clinic or [phone].        Care EveryWhere ID     This is your Care EveryWhere ID. This could be used by other organizations to access your [place] medical records  [id number]         Blood Pressure from Last 3 Encounters:   17 109/70   06/15/17 104/71   05/10/17 108/70    Weight from Last 3 Encounters:   17 64.7 kg (142 lb 9.6 oz)   06/15/17 62.8 kg (138 lb 6.4 oz)   05/10/17 61.7 kg (136 lb)              We Performed the Following     Allergy Shot: Two or more injections        Primary Care Provider Office Phone # Fax #    Paul Weber PA-C [phone]       [address] [place] [address]        Equal Access to Services     MEGHAN CARR : Sandra ruiz Sokeisha, waaxda luqadaha, qaybta kaalmada micaelayachiqui, rema negron . [facility] [phone].    ATENCIÓN: Si stanton curry " disposición servicios gratuitos de asistencia lingüística. Luciana stratton [phone].    We comply with applicable federal civil rights laws and [place] laws. We do not discriminate on the basis of race, color, national origin, age, disability, sex, sexual orientation, or gender identity.            Thank you!     Thank you for choosing [facility]  for your care. Our goal is always to provide you with excellent care. Hearing back from our patients is one way we can continue to improve our services. Please take a few minutes to complete the written survey that you may receive in the mail after your visit with us. Thank you!             Your Updated Medication List - Protect others around you: Learn how to safely use, store and throw away your medicines at www.disposemymeds.org.          This list is accurate as of: 12/19/17  1:32 PM.  Always use your most recent med list.                   Brand Name Dispense Instructions for use Diagnosis    * ALLERGEN IMMUNOTHERAPY PRESCRIPTION     5 mL    Name of Mix: Mix #1  Mold Alternaria Tenuis GLY 1:10 w/v, HS  0.5 ml Hormodendrum Cladosporioides 1:10 w/v, HS 0.5 ml Diluent: HSA qs to 5ml    Chronic seasonal allergic rhinitis due to pollen, Allergic rhinitis due to mold       * ALLERGEN IMMUNOTHERAPY PRESCRIPTION     5 mL    Name of Mix: Mix #2  Tree  Julio Cesar, White  GLY 1:20 w/v, HS  0.5 ml Birch Mix GLY 1:20 w/v, HS  0.5 ml Boxelder-Maple  Mix BHR (Boxelder Hard Red) 1:20 w/v, HS  0.5 ml [place], Common GLY 1:20 w/v, HS  0.5 ml Elm, American GLY 1:20 w/v, HS  0.5 ml Hackberry GLY 1:20 w/v, HS 0.5 ml Hickory, Shagbark GLY 1:20 w/v, HS  0.5 ml [place] Mix GLY 1:20 w/v, HS 0.5 ml [place], Black GLY 1:20 w/v, HS 0.5 ml Diluent: HSA qs to 5ml    Chronic seasonal allergic rhinitis due to pollen, Allergic rhinitis due to mold       * ALLERGEN IMMUNOTHERAPY PRESCRIPTION     5 mL    Name of Mix: Mix #3  Weeds Nettle GLY 1:20 w/v, HS 0.5 ml Pigweed, Careless GLY 1:20 w/v,  HS 0.5 ml Plantain, English GLY 1:20 w/v, HS 0.5 ml Diluent: HSA qs to 5ml    Chronic seasonal allergic rhinitis due to pollen, Allergic rhinitis due to mold       EPINEPHrine 0.3 MG/0.3ML injection 2-pack    AUVI-Q    2 mL    Inject 0.3 mLs (0.3 mg) into the muscle as needed for anaphylaxis    Need for desensitization to allergens       fluticasone 50 MCG/ACT spray    FLONASE    3 Bottle    Spray 1-2 sprays into both nostrils daily    Seasonal allergic rhinitis, unspecified allergic rhinitis trigger       omeprazole 20 MG CR capsule    priLOSEC    30 capsule    Take 1 capsule (20 mg) by mouth daily    Abdominal pain, epigastric       * Notice:  This list has 3 medication(s) that are the same as other medications prescribed for you. Read the directions carefully, and ask your doctor or other care provider to review them with you.

## 2017-12-19 NOTE — PROGRESS NOTES
Patient presented after waiting 30 minutes with no reaction to allergy injections. Discharged from clinic.  Lori Nguyen RN ............   12/19/2017...1:32 PM

## 2017-12-27 ENCOUNTER — ALLIED HEALTH/NURSE VISIT (OUTPATIENT)
Dept: ALLERGY | Facility: CLINIC | Age: 37
End: 2017-12-27
Payer: COMMERCIAL

## 2017-12-27 DIAGNOSIS — J30.1 ALLERGIC RHINITIS DUE TO POLLEN: Primary | ICD-10-CM

## 2017-12-27 PROCEDURE — 95117 IMMUNOTHERAPY INJECTIONS: CPT

## 2017-12-27 PROCEDURE — 99207 ZZC NO CHARGE LOS: CPT

## 2017-12-27 NOTE — PROGRESS NOTES
Patient presented after waiting 30 minutes with no reaction to allergy injections. Discharged from clinic.    Katelyn Rowley RN

## 2017-12-27 NOTE — MR AVS SNAPSHOT
After Visit Summary   12/27/2017    Gogo Bourgeois    MRN: [id number]           Patient Information     Date Of Birth          1980        Visit Information        Provider Department      12/27/2017 12:00 PM FZ ALLERGY SHOTS [facility]        Today's Diagnoses     Allergic rhinitis due to pollen    -  1       Follow-ups after your visit        Your next 10 appointments already scheduled     Jan 02, 2018  1:30 PM CST   Nurse Only with FZ ALLERGY SHOTS   [facility] [place] [facility] [place]    [address]  [place] [address]   [phone]            Jan 09, 2018  3:40 PM CST   Nurse Only with FZ ALLERGY SHOTS   [facility] [place] ([facility] [place]    [address]  [place] [address]   [phone]            Jan 16, 2018 12:50 PM CST   Nurse Only with FZ ALLERGY SHOTS   [facility] [place] ([facility] [place]    [address]  [place] [address]   [phone]            Jan 23, 2018  3:50 PM CST   Nurse Only with FZ ALLERGY SHOTS   [facility] [place] ([facility] [place]    [address]  [place] [address]   [phone]            Jan 30, 2018 12:30 PM CST   Nurse Only with FZ ALLERGY SHOTS   [facility] [place] ([facility] [place])    [address]  [place] [address]   [phone]              Who to contact     If you have questions or need follow up information about today's clinic visit or your schedule please contact [facility] directly at [phone].  Normal or non-critical lab and imaging results will be communicated to you by MyChart, letter or phone within 4 business days after the clinic has received the results. If you do not hear from us within 7 days, please contact the clinic through MyChart or phone. If you have a critical or abnormal lab result, we will notify you by phone as soon as possible.  Submit refill requests through  "MyChart or call your pharmacy and they will forward the refill request to us. Please allow 3 business days for your refill to be completed.          Additional Information About Your Visit        MyChart Information     Skimblt lets you send messages to your doctor, view your test results, renew your prescriptions, schedule appointments and more. To sign up, go to [email or web address] . Click on \"Log in\" on the left side of the screen, which will take you to the Welcome page. Then click on \"Sign up Now\" on the right side of the page.     You will be asked to enter the access code listed below, as well as some personal information. Please follow the directions to create your username and password.     Your access code is: [email or web address]  Expires: 3/27/2018 12:38 PM     Your access code will  in 90 days. If you need help or a new code, please call your [place] clinic or [phone].        Care EveryWhere ID     This is your Care EveryWhere ID. This could be used by other organizations to access your [place] medical records  [id number]         Blood Pressure from Last 3 Encounters:   17 109/70   06/15/17 104/71   05/10/17 108/70    Weight from Last 3 Encounters:   17 64.7 kg (142 lb 9.6 oz)   06/15/17 62.8 kg (138 lb 6.4 oz)   05/10/17 61.7 kg (136 lb)              We Performed the Following     Allergy Shot: Two or more injections        Primary Care Provider Office Phone # Fax #    Paul Weber PA-C [phone]       [address] [place] [address]        Equal Access to Services     [facility] BRUNO : Hadii madai Solis, wadarrickda luqadaha, qaybta kaalmada mando, rema umaña. So [facility] [phone].    ATENCIÓN: Si habla español, tiene a arteaga disposición servicios gratuitos de asistencia lingüística. Llame al [phone].    We comply with applicable federal civil rights laws and [place] laws. We do not discriminate on the basis " of race, color, national origin, age, disability, sex, sexual orientation, or gender identity.            Thank you!     Thank you for choosing [facility]  for your care. Our goal is always to provide you with excellent care. Hearing back from our patients is one way we can continue to improve our services. Please take a few minutes to complete the written survey that you may receive in the mail after your visit with us. Thank you!             Your Updated Medication List - Protect others around you: Learn how to safely use, store and throw away your medicines at www.disposemymeds.org.          This list is accurate as of: 12/27/17 12:38 PM.  Always use your most recent med list.                   Brand Name Dispense Instructions for use Diagnosis    * ALLERGEN IMMUNOTHERAPY PRESCRIPTION     5 mL    Name of Mix: Mix #1  Mold Alternaria Tenuis GLY 1:10 w/v, HS  0.5 ml Hormodendrum Cladosporioides 1:10 w/v, HS 0.5 ml Diluent: HSA qs to 5ml    Chronic seasonal allergic rhinitis due to pollen, Allergic rhinitis due to mold       * ALLERGEN IMMUNOTHERAPY PRESCRIPTION     5 mL    Name of Mix: Mix #2  Tree  Julio Cesar, White  GLY 1:20 w/v, HS  0.5 ml Birch Mix GLY 1:20 w/v, HS  0.5 ml Boxelder-Maple  Mix BHR (Boxelder Hard Red) 1:20 w/v, HS  0.5 ml [place], Common GLY 1:20 w/v, HS  0.5 ml Elm, American GLY 1:20 w/v, HS  0.5 ml Hackberry GLY 1:20 w/v, HS 0.5 ml [place], Shagbark GLY 1:20 w/v, HS  0.5 ml [place] Mix GLY 1:20 w/v, HS 0.5 ml [place], Black GLY 1:20 w/v, HS 0.5 ml Diluent: HSA qs to 5ml    Chronic seasonal allergic rhinitis due to pollen, Allergic rhinitis due to mold       * ALLERGEN IMMUNOTHERAPY PRESCRIPTION     5 mL    Name of Mix: Mix #3  Weeds Nettle GLY 1:20 w/v, HS 0.5 ml Pigweed, Careless GLY 1:20 w/v, HS 0.5 ml Plantain, English GLY 1:20 w/v, HS 0.5 ml Diluent: HSA qs to 5ml    Chronic seasonal allergic rhinitis due to pollen, Allergic rhinitis due to mold       EPINEPHrine 0.3 MG/0.3ML  injection 2-pack    AUVI-Q    2 mL    Inject 0.3 mLs (0.3 mg) into the muscle as needed for anaphylaxis    Need for desensitization to allergens       fluticasone 50 MCG/ACT spray    FLONASE    3 Bottle    Spray 1-2 sprays into both nostrils daily    Seasonal allergic rhinitis, unspecified allergic rhinitis trigger       omeprazole 20 MG CR capsule    priLOSEC    30 capsule    Take 1 capsule (20 mg) by mouth daily    Abdominal pain, epigastric       * Notice:  This list has 3 medication(s) that are the same as other medications prescribed for you. Read the directions carefully, and ask your doctor or other care provider to review them with you.

## 2018-01-02 ENCOUNTER — OFFICE VISIT (OUTPATIENT)
Dept: FAMILY MEDICINE | Facility: CLINIC | Age: 38
End: 2018-01-02
Payer: COMMERCIAL

## 2018-01-02 ENCOUNTER — ALLIED HEALTH/NURSE VISIT (OUTPATIENT)
Dept: ALLERGY | Facility: CLINIC | Age: 38
End: 2018-01-02
Payer: COMMERCIAL

## 2018-01-02 VITALS
TEMPERATURE: 97.8 F | DIASTOLIC BLOOD PRESSURE: 81 MMHG | SYSTOLIC BLOOD PRESSURE: 125 MMHG | HEART RATE: 92 BPM | HEIGHT: 61 IN | WEIGHT: 141 LBS | BODY MASS INDEX: 26.62 KG/M2

## 2018-01-02 DIAGNOSIS — B37.31 YEAST INFECTION OF THE VAGINA: ICD-10-CM

## 2018-01-02 DIAGNOSIS — J30.1 ALLERGIC RHINITIS DUE TO POLLEN: Primary | ICD-10-CM

## 2018-01-02 DIAGNOSIS — Z00.01 ENCOUNTER FOR GENERAL ADULT MEDICAL EXAMINATION WITH ABNORMAL FINDINGS: Primary | ICD-10-CM

## 2018-01-02 DIAGNOSIS — Z13.220 LIPID SCREENING: ICD-10-CM

## 2018-01-02 DIAGNOSIS — Z13.1 SCREENING FOR DIABETES MELLITUS: ICD-10-CM

## 2018-01-02 DIAGNOSIS — Z13.29 SCREENING FOR THYROID DISORDER: ICD-10-CM

## 2018-01-02 DIAGNOSIS — R10.13 ABDOMINAL PAIN, EPIGASTRIC: ICD-10-CM

## 2018-01-02 LAB
CHOLEST SERPL-MCNC: 192 MG/DL
GLUCOSE SERPL-MCNC: 86 MG/DL (ref 70–99)
HDLC SERPL-MCNC: 83 MG/DL
LDLC SERPL CALC-MCNC: 85 MG/DL
NONHDLC SERPL-MCNC: 109 MG/DL
SPECIMEN SOURCE: ABNORMAL
TRIGL SERPL-MCNC: 118 MG/DL
TSH SERPL DL<=0.005 MIU/L-ACNC: 1.06 MU/L (ref 0.4–4)
WET PREP SPEC: ABNORMAL

## 2018-01-02 PROCEDURE — 84443 ASSAY THYROID STIM HORMONE: CPT | Performed by: NURSE PRACTITIONER

## 2018-01-02 PROCEDURE — 36415 COLL VENOUS BLD VENIPUNCTURE: CPT | Performed by: NURSE PRACTITIONER

## 2018-01-02 PROCEDURE — 80061 LIPID PANEL: CPT | Performed by: NURSE PRACTITIONER

## 2018-01-02 PROCEDURE — 87210 SMEAR WET MOUNT SALINE/INK: CPT | Performed by: NURSE PRACTITIONER

## 2018-01-02 PROCEDURE — 99395 PREV VISIT EST AGE 18-39: CPT | Performed by: NURSE PRACTITIONER

## 2018-01-02 PROCEDURE — 82947 ASSAY GLUCOSE BLOOD QUANT: CPT | Performed by: NURSE PRACTITIONER

## 2018-01-02 PROCEDURE — 99207 ZZC NO CHARGE LOS: CPT

## 2018-01-02 PROCEDURE — 95117 IMMUNOTHERAPY INJECTIONS: CPT

## 2018-01-02 RX ORDER — FLUCONAZOLE 150 MG/1
150 TABLET ORAL ONCE
Qty: 1 TABLET | Refills: 0 | Status: SHIPPED | OUTPATIENT
Start: 2018-01-02 | End: 2018-01-02

## 2018-01-02 NOTE — MR AVS SNAPSHOT
After Visit Summary   1/2/2018    Gogo Bourgeois    MRN: [id number]           Patient Information     Date Of Birth          1980        Visit Information        Provider Department      1/2/2018 1:30 PM FZ ALLERGY SHOTS [facility]        Today's Diagnoses     Allergic rhinitis due to pollen    -  1       Follow-ups after your visit        Your next 10 appointments already scheduled     Jan 02, 2018  3:20 PM CST   PHYSICAL with Barbara Jack NP   [facility] ([facility] [place])    [address]  [place] [address]   [phone]            Jan 09, 2018  3:40 PM CST   Nurse Only with FZ ALLERGY SHOTS   [facility] [place] ([facility] [place])    [address]  [place] [address]   [phone]            Jan 16, 2018 12:50 PM CST   Nurse Only with FZ ALLERGY SHOTS   [facility] [place] ([facility] [place])    [address]  [place] [address]   [phone]            Jan 23, 2018  3:50 PM CST   Nurse Only with FZ ALLERGY SHOTS   [facility] [place] ([facility] [place])    [address]  [place] [address]   [phone]            Jan 30, 2018 12:30 PM CST   Nurse Only with FZ ALLERGY SHOTS   [facility] [place] ([facility] [place])    [address]  [place] [address]   [phone]              Who to contact     If you have questions or need follow up information about today's clinic visit or your schedule please contact [facility] [place] [facility] directly at [phone].  Normal or non-critical lab and imaging results will be communicated to you by MyChart, letter or phone within 4 business days after the clinic has received the results. If you do not hear from us within 7 days, please contact the clinic through MyChart or phone. If you have a critical or abnormal lab result, we will notify you by phone as soon as possible.  Submit refill requests through  "MyChart or call your pharmacy and they will forward the refill request to us. Please allow 3 business days for your refill to be completed.          Additional Information About Your Visit        MyChart Information     MMRGlobalt lets you send messages to your doctor, view your test results, renew your prescriptions, schedule appointments and more. To sign up, go to [email or web address] . Click on \"Log in\" on the left side of the screen, which will take you to the Welcome page. Then click on \"Sign up Now\" on the right side of the page.     You will be asked to enter the access code listed below, as well as some personal information. Please follow the directions to create your username and password.     Your access code is: [email or web address]  Expires: 3/27/2018 12:38 PM     Your access code will  in 90 days. If you need help or a new code, please call your [place] clinic or [phone].        Care EveryWhere ID     This is your Care EveryWhere ID. This could be used by other organizations to access your [place] medical records  [id number]         Blood Pressure from Last 3 Encounters:   17 109/70   06/15/17 104/71   05/10/17 108/70    Weight from Last 3 Encounters:   17 64.7 kg (142 lb 9.6 oz)   06/15/17 62.8 kg (138 lb 6.4 oz)   05/10/17 61.7 kg (136 lb)              We Performed the Following     Allergy Shot: Two or more injections        Primary Care Provider Office Phone # Fax #    Paul Weber PA-C [phone]       [address] [place] [address]        Equal Access to Services     [facility] BRUNO : Hadii madai Solis, wadarrickda luqadaha, qaybta kaalmada mando, rema umaña. So [facility] [phone].    ATENCIÓN: Si habla español, tiene a arteaga disposición servicios gratuitos de asistencia lingüística. Llame al [phone].    We comply with applicable federal civil rights laws and [place] laws. We do not discriminate on the basis " of race, color, national origin, age, disability, sex, sexual orientation, or gender identity.            Thank you!     Thank you for choosing [facility]  for your care. Our goal is always to provide you with excellent care. Hearing back from our patients is one way we can continue to improve our services. Please take a few minutes to complete the written survey that you may receive in the mail after your visit with us. Thank you!             Your Updated Medication List - Protect others around you: Learn how to safely use, store and throw away your medicines at www.disposemymeds.org.          This list is accurate as of: 1/2/18  1:58 PM.  Always use your most recent med list.                   Brand Name Dispense Instructions for use Diagnosis    * ALLERGEN IMMUNOTHERAPY PRESCRIPTION     5 mL    Name of Mix: Mix #1  Mold Alternaria Tenuis GLY 1:10 w/v, HS  0.5 ml Hormodendrum Cladosporioides 1:10 w/v, HS 0.5 ml Diluent: HSA qs to 5ml    Chronic seasonal allergic rhinitis due to pollen, Allergic rhinitis due to mold       * ALLERGEN IMMUNOTHERAPY PRESCRIPTION     5 mL    Name of Mix: Mix #2  Tree  Julio Cesar, White  GLY 1:20 w/v, HS  0.5 ml Birch Mix GLY 1:20 w/v, HS  0.5 ml Boxelder-Maple  Mix BHR (Boxelder Hard Red) 1:20 w/v, HS  0.5 ml [place], Common GLY 1:20 w/v, HS  0.5 ml Elm, American GLY 1:20 w/v, HS  0.5 ml Hackberry GLY 1:20 w/v, HS 0.5 ml Hickory, Shagbark GLY 1:20 w/v, HS  0.5 ml [place] Mix GLY 1:20 w/v, HS 0.5 ml [place], Black GLY 1:20 w/v, HS 0.5 ml Diluent: HSA qs to 5ml    Chronic seasonal allergic rhinitis due to pollen, Allergic rhinitis due to mold       * ALLERGEN IMMUNOTHERAPY PRESCRIPTION     5 mL    Name of Mix: Mix #3  Weeds Nettle GLY 1:20 w/v, HS 0.5 ml Pigweed, Careless GLY 1:20 w/v, HS 0.5 ml Plantain, English GLY 1:20 w/v, HS 0.5 ml Diluent: HSA qs to 5ml    Chronic seasonal allergic rhinitis due to pollen, Allergic rhinitis due to mold       EPINEPHrine 0.3 MG/0.3ML  injection 2-pack    AUVI-Q    2 mL    Inject 0.3 mLs (0.3 mg) into the muscle as needed for anaphylaxis    Need for desensitization to allergens       fluticasone 50 MCG/ACT spray    FLONASE    3 Bottle    Spray 1-2 sprays into both nostrils daily    Seasonal allergic rhinitis, unspecified allergic rhinitis trigger       omeprazole 20 MG CR capsule    priLOSEC    30 capsule    Take 1 capsule (20 mg) by mouth daily    Abdominal pain, epigastric       * Notice:  This list has 3 medication(s) that are the same as other medications prescribed for you. Read the directions carefully, and ask your doctor or other care provider to review them with you.

## 2018-01-02 NOTE — PROGRESS NOTES
SUBJECTIVE:   CC: Gogo Bourgeois is an 37 year old woman who presents for preventive health visit.     Healthy Habits:    Do you get at least three servings of calcium containing foods daily (dairy, green leafy vegetables, etc.)? no    Amount of exercise or daily activities, outside of work: 1-2 day(s) per week    Problems taking medications regularly No    Medication side effects: No    Have you had an eye exam in the past two years? no    Do you see a dentist twice per year? yes    Do you have sleep apnea, excessive snoring or daytime drowsiness?no        Vaginal Symptoms x2 days  Reporting itching and thick yellow/ white discharge.   No urinary symptoms.  Reports recent antibiotic use due to frequent UTI.     Patient informed that anything we discuss that is not related to preventative medicine, may be billed for; patient verbalizes understanding.        Today's PHQ-2 Score:   PHQ-2 ( 1999 Pf[employer]) 1/2/2018 5/10/2017   Q1: Little interest or pleasure in doing things 0 0   Q2: Feeling down, depressed or hopeless 0 0   PHQ-2 Score 0 0         Abuse: Current or Past(Physical, Sexual or Emotional)- No  Do you feel safe in your environment - Yes  Social History   Substance Use Topics     Smoking status: Never Smoker     Smokeless tobacco: Never Used      Comment: Nonsmoking household     Alcohol use No     If you drink alcohol do you typically have >3 drinks per day or >7 drinks per week? No                     Reviewed orders with patient.  Reviewed health maintenance and updated orders accordingly - Yes  Labs reviewed in EPIC  BP Readings from Last 3 Encounters:   01/02/18 125/81   11/20/17 109/70   06/15/17 104/71    Wt Readings from Last 3 Encounters:   01/02/18 141 lb (64 kg)   11/20/17 142 lb 9.6 oz (64.7 kg)   06/15/17 138 lb 6.4 oz (62.8 kg)                  Patient Active Problem List   Diagnosis     S/P LEEP of cervix     Traction alopecia     S/P gastric bypass     Dyshidrotic eczema     Seasonal  allergic rhinitis due to pollen     Allergic rhinitis due to mold     Allergic conjunctivitis, bilateral     Pollen-food allergy syndrome, initial encounter     Past Surgical History:   Procedure Laterality Date     C MAMMOGRAM, W/BILAT. IMPLANTS  2012    in [place]     COSMETIC ABDOMINOPLASTY  2012    plus plast of arms and inner thighs     HC REMOVAL GALLBLADDER  12/2001     LAPAROSCOPIC BYPASS GASTRIC  8/23/11    Sleeve done in [facility] [place], CERVICAL  2002     TUBAL LIGATION  2014       Social History   Substance Use Topics     Smoking status: Never Smoker     Smokeless tobacco: Never Used      Comment: Nonsmoking household     Alcohol use No     Family History   Problem Relation Age of Onset     Hypertension Maternal Grandmother      Respiratory Paternal Grandmother      smoker     Alcohol/Drug Paternal Grandfather      alcohlism, cirrhosis of liver         Current Outpatient Prescriptions   Medication Sig Dispense Refill     omeprazole (PRILOSEC) 20 MG CR capsule Take 1 capsule (20 mg) by mouth daily 90 capsule 3     fluconazole (DIFLUCAN) 150 MG tablet Take 1 tablet (150 mg) by mouth once for 1 dose 1 tablet 0     fluticasone (FLONASE) 50 MCG/ACT spray Spray 1-2 sprays into both nostrils daily 3 Bottle 11     ORDER FOR ALLERGEN IMMUNOTHERAPY Name of Mix: Mix #1  Mold  Alternaria Tenuis GLY 1:10 w/v, HS  0.5 ml  Hormodendrum Cladosporioides 1:10 w/v, HS 0.5 ml  Diluent: HSA qs to 5ml (Patient not taking: Reported on 1/2/2018) 5 mL PRN     ORDER FOR ALLERGEN IMMUNOTHERAPY Name of Mix: Mix #2  Tree   Julio Cesar, White  GLY 1:20 w/v, HS  0.5 ml  Birch Mix GLY 1:20 w/v, HS  0.5 ml  Boxelder-Maple  Mix BHR (Boxelder Hard Red) 1:20 w/v, HS  0.5 ml  [place], Common GLY 1:20 w/v, HS  0.5 ml  Elm, American GLY 1:20 w/v, HS  0.5 ml  Hackberry GLY 1:20 w/v, HS 0.5 ml  [place], Shagbark GLY 1:20 w/v, HS  0.5 ml  [place] Mix GLY 1:20 w/v, HS 0.5 ml  [place], Black GLY 1:20 w/v, HS 0.5 ml  Diluent: HSA qs to 5ml (Patient  not taking: Reported on 2018) 5 mL PRN     ORDER FOR ALLERGEN IMMUNOTHERAPY Name of Mix: Mix #3  Weeds  Nettle GLY 1:20 w/v, HS 0.5 ml  Pigweed, Careless GLY 1:20 w/v, HS 0.5 ml  Plantain, English GLY 1:20 w/v, HS 0.5 ml  Diluent: HSA qs to 5ml (Patient not taking: Reported on 2018) 5 mL PRN     EPINEPHrine (AUVI-Q) 0.3 MG/0.3ML injection Inject 0.3 mLs (0.3 mg) into the muscle as needed for anaphylaxis (Patient not taking: Reported on 2017) 2 mL 2     Allergies   Allergen Reactions     Nkda [No Known Drug Allergies]      Seasonal Allergies            Mammogram not appropriate for this patient based on age.    Pertinent mammograms are reviewed under the imaging tab.  History of abnormal Pap smear: NO - age 30- 65 PAP every 3 years recommended    Reviewed and updated as needed this visit by clinical staffTobacco  Meds         Reviewed and updated as needed this visit by Provider        Past Medical History:   Diagnosis Date     Abnormal Pap smear of cervix     treated w/ cryotherapy in      ASCUS on Pap smear 10/2013    Neg for high risk HPV     Esophageal reflux      LSIL (low grade squamous intraepithelial lesion) on Pap smear 13    repeat pap 6 months     Overweight, obesity and other hyperalimentation      Papanicolaou smear of cervix with low grade squamous intraepithelial lesion (LGSIL) 6/15/12    2012 colp - SUE 1     SAB (spontaneous )     balanced transslocation of chromosones      Past Surgical History:   Procedure Laterality Date     C MAMMOGRAM, W/BILAT. IMPLANTS  2012    in [place]     COSMETIC ABDOMINOPLASTY      plus plast of arms and inner thighs     HC REMOVAL GALLBLADDER  2001     LAPAROSCOPIC BYPASS GASTRIC  11    Sleeve done in [facility] [place], CERVICAL       TUBAL LIGATION       Obstetric History       T4      L4     SAB4   TAB0   Ectopic0   Multiple0   Live Births4       # Outcome Date GA Lbr Ant/2nd Weight Sex Delivery  "Anes PTL Lv   9 Term 14   7 lb 10 oz (3.459 kg) F    [place]      Name: Gloria   8  08/09/10 36w0d  6 lb 15 oz (3.147 kg) M Vag-Spont         Name: Orlando   7 Term 08 39w0d  9 lb 2 oz (4.139 kg) F   N [place]      Name: Barbara   6 2007 12w0d       DEC   5 2005 12w0d       DEC   4 2004 12w0d       DEC   3 2003 12w0d       DEC   2 Term 02 37w0d  8 lb 15 oz (4.054 kg) M   N [place]      Name: Candelario   1 Term 00 37w0d  9 lb 9 oz (4.338 kg) F   N [place]      Name: Kary Casanova   Has had 4 spontaneous miscarriages, 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       ROS:  C: NEGATIVE for fever, chills, change in weight  I: NEGATIVE for worrisome rashes, moles or lesions  E: NEGATIVE for vision changes or irritation  ENT: NEGATIVE for ear, mouth and throat problems  R: NEGATIVE for significant cough or SOB  B: NEGATIVE for masses, tenderness or discharge  CV: NEGATIVE for chest pain, palpitations or peripheral edema  GI: NEGATIVE for nausea, abdominal pain, heartburn, or change in bowel habits  : NEGATIVE for unusual urinary or vaginal symptoms. Periods are regular.  M: NEGATIVE for significant arthralgias or myalgia  N: NEGATIVE for weakness, dizziness or paresthesias  E: NEGATIVE for temperature intolerance, skin/hair changes  H: NEGATIVE for bleeding problems  P: NEGATIVE for changes in mood or affect    OBJECTIVE:   /81  Pulse 92  Temp 97.8  F (36.6  C) (Tympanic)  Ht 5' 1\" (1.549 m)  Wt 141 lb (64 kg)  LMP 2017  Breastfeeding? No  BMI 26.64 kg/m2  EXAM:  GENERAL: healthy, alert and no distress  EYES: Eyes grossly normal to inspection, PERRL and conjunctivae and sclerae normal  HENT: ear canals and TM's normal, nose and mouth without ulcers or lesions  NECK: no adenopathy, no asymmetry, masses, or scars and thyroid normal to palpation  RESP: lungs clear to auscultation - no rales, " "rhonchi or wheezes  BREAST: normal without masses, tenderness or nipple discharge and no palpable axillary masses or adenopathy  CV: regular rate and rhythm, normal S1 S2, no S3 or S4, no murmur, click or rub, no peripheral edema and peripheral pulses strong  ABDOMEN: soft, nontender, no hepatosplenomegaly, no masses and bowel sounds normal   (female): exam deferred, yeast infection per wet prep  MS: no gross musculoskeletal defects noted, no edema  SKIN: no suspicious lesions or rashes  NEURO: Normal strength and tone, mentation intact and speech normal  PSYCH: mentation appears normal, affect normal/bright  LYMPH: no cervical, supraclavicular, axillary, or inguinal adenopathy    ASSESSMENT/PLAN:       ICD-10-CM    1. Encounter for general adult medical examination with abnormal findings Z00.01    2. Abdominal pain, epigastric R10.13 omeprazole (PRILOSEC) 20 MG CR capsule   3. Lipid screening Z13.220 Lipid Profile (Chol, Trig, HDL, LDL calc)   4. Screening for diabetes mellitus Z13.1 Glucose   5. Screening for thyroid disorder Z13.29 TSH with free T4 reflex   6. Yeast infection of the vagina B37.3 Wet prep     fluconazole (DIFLUCAN) 150 MG tablet       COUNSELING:   Reviewed preventive health counseling, as reflected in patient instructions       reports that she has never smoked. She has never used smokeless tobacco.    Estimated body mass index is 26.64 kg/(m^2) as calculated from the following:    Height as of this encounter: 5' 1\" (1.549 m).    Weight as of this encounter: 141 lb (64 kg).   Weight management plan: Discussed healthy diet and exercise guidelines and patient will follow up in 12 months in clinic to re-evaluate.    Counseling Resources:  ATP IV Guidelines  Pooled Cohorts Equation Calculator  Breast Cancer Risk Calculator  FRAX Risk Assessment  ICSI Preventive Guidelines  Dietary Guidelines for Americans, 2010  USDA's MyPlate  ASA Prophylaxis  Lung CA Screening    CINDY Gordon " [facility]

## 2018-01-02 NOTE — PATIENT INSTRUCTIONS
Activated charcoal- OTC for stomach bloating/ gas    Preventive Health Recommendations  Female Ages 26 - 39  Yearly exam:   See your health care provider every year in order to    Review health changes.     Discuss preventive care.      Review your medicines if you your doctor has prescribed any.    Until age 30: Get a Pap test every three years (more often if you have had an abnormal result).    After age 30: Talk to your doctor about whether you should have a Pap test every 3 years or have a Pap test with HPV screening every 5 years.   You do not need a Pap test if your uterus was removed (hysterectomy) and you have not had cancer.  You should be tested each year for STDs (sexually transmitted diseases), if you're at risk.   Talk to your provider about how often to have your cholesterol checked.  If you are at risk for diabetes, you should have a diabetes test (fasting glucose).  Shots: Get a flu shot each year. Get a tetanus shot every 10 years.   Nutrition:     Eat at least 5 servings of fruits and vegetables each day.    Eat whole-grain bread, whole-wheat pasta and brown rice instead of white grains and rice.    Talk to your provider about Calcium and Vitamin D.     Lifestyle    Exercise at least 150 minutes a week (30 minutes a day, 5 days of the week). This will help you control your weight and prevent disease.    Limit alcohol to one drink per day.    No smoking.     Wear sunscreen to prevent skin cancer.    See your dentist every six months for an exam and cleaning.    Vaginal Infection: Yeast (Candidiasis)  Yeast infection occurs when yeast in the vagina increase and attacks the vaginal tissues. Yeast is a type of fungus. These infections are often caused by a type of yeast called Candida albicans. Other species of yeast can also cause infections. Factors that may make infection more likely include recent antibiotic use, douching, or increased sex. Yeast infections are more common in women who have  diabetes, or are obese or pregnant, or have a weak immune system.  Symptoms of yeast infection    Clumpy or thin, white discharge, which may look like cottage cheese    No odor or minimal odor    Severe vaginal itching or burning    Burning with urination    Swelling, redness of vulva    Pain during sex  Treating yeast infection  Yeast infection is treated with a vaginal antifungal cream. In some cases, antifungal pills are prescribed instead. During treatment:    Finish all of your medicine, even if your symptoms go away.    Apply the cream before going to bed. Lie flat after applying so that it doesn't drip out.    Do not douche or use tampons.    Don't rely on a diaphragm or condoms, since the cream may weaken them.    Avoid intercourse if advised by your healthcare provider.     Should I treat a yeast infection myself?  Discuss with your healthcare provider whether you should use over-the-counter medicines to treat a yeast infection. Self-treatment may depend on whether:    You've had a yeast infection in the past.    You're at risk for STDs.  Call your healthcare provider if symptoms do not go away or come back after treatment.   Date Last Reviewed: 3/1/2017    2745-1844 [employer]. [address] [place] [address], [place] [address]. All rights reserved. This information is not intended as a substitute for professional medical care. Always follow your healthcare professional's instructions.

## 2018-01-02 NOTE — PROGRESS NOTES
Patient presented after waiting 30 minutes with no reaction to allergy injections. Discharged from clinic.  Lori Nguyen RN ............   1/2/2018...1:58 PM

## 2018-01-02 NOTE — PROGRESS NOTES
Results discussed directly with patient while patient was present. Any further details documented in the note.   Barbara Jack NP

## 2018-01-02 NOTE — MR AVS SNAPSHOT
After Visit Summary   1/2/2018    Gogo Bourgeois    MRN: [id number]           Patient Information     Date Of Birth          1980        Visit Information        Provider Department      1/2/2018 3:20 PM Barbara Jack NP [facility]        Today's Diagnoses     Vaginal symptom    -  1    Abdominal pain, epigastric        Lipid screening        Screening for diabetes mellitus        Screening for thyroid disorder        Yeast infection of the vagina          Care Instructions    Activated charcoal- OTC for stomach bloating/ gas    Preventive Health Recommendations  Female Ages 26 - 39  Yearly exam:   See your health care provider every year in order to    Review health changes.     Discuss preventive care.      Review your medicines if you your doctor has prescribed any.    Until age 30: Get a Pap test every three years (more often if you have had an abnormal result).    After age 30: Talk to your doctor about whether you should have a Pap test every 3 years or have a Pap test with HPV screening every 5 years.   You do not need a Pap test if your uterus was removed (hysterectomy) and you have not had cancer.  You should be tested each year for STDs (sexually transmitted diseases), if you're at risk.   Talk to your provider about how often to have your cholesterol checked.  If you are at risk for diabetes, you should have a diabetes test (fasting glucose).  Shots: Get a flu shot each year. Get a tetanus shot every 10 years.   Nutrition:     Eat at least 5 servings of fruits and vegetables each day.    Eat whole-grain bread, whole-wheat pasta and brown rice instead of white grains and rice.    Talk to your provider about Calcium and Vitamin D.     Lifestyle    Exercise at least 150 minutes a week (30 minutes a day, 5 days of the week). This will help you control your weight and prevent disease.    Limit alcohol to one drink per day.    No smoking.     Wear sunscreen to prevent skin  cancer.    See your dentist every six months for an exam and cleaning.    Vaginal Infection: Yeast (Candidiasis)  Yeast infection occurs when yeast in the vagina increase and attacks the vaginal tissues. Yeast is a type of fungus. These infections are often caused by a type of yeast called Candida albicans. Other species of yeast can also cause infections. Factors that may make infection more likely include recent antibiotic use, douching, or increased sex. Yeast infections are more common in women who have diabetes, or are obese or pregnant, or have a weak immune system.  Symptoms of yeast infection    Clumpy or thin, white discharge, which may look like cottage cheese    No odor or minimal odor    Severe vaginal itching or burning    Burning with urination    Swelling, redness of vulva    Pain during sex  Treating yeast infection  Yeast infection is treated with a vaginal antifungal cream. In some cases, antifungal pills are prescribed instead. During treatment:    Finish all of your medicine, even if your symptoms go away.    Apply the cream before going to bed. Lie flat after applying so that it doesn't drip out.    Do not douche or use tampons.    Don't rely on a diaphragm or condoms, since the cream may weaken them.    Avoid intercourse if advised by your healthcare provider.     Should I treat a yeast infection myself?  Discuss with your healthcare provider whether you should use over-the-counter medicines to treat a yeast infection. Self-treatment may depend on whether:    You've had a yeast infection in the past.    You're at risk for STDs.  Call your healthcare provider if symptoms do not go away or come back after treatment.   Date Last Reviewed: 3/1/2017    7963-9343 [employer] [address] [place] [address], [place] [address]. All rights reserved. This information is not intended as a substitute for professional medical care. Always follow your healthcare professional's instructions.              "   Follow-ups after your visit        Follow-up notes from your care team     Return if symptoms worsen or fail to improve.      Your next 10 appointments already scheduled     Jan 09, 2018  3:40 PM CST   Nurse Only with FZ ALLERGY SHOTS   [facility] ([facility])    [address]  [place] [address]   [phone]            Jan 16, 2018 12:50 PM CST   Nurse Only with FZ ALLERGY SHOTS   [facility] ([facility])    [address] [place] [address]   [phone]            Jan 23, 2018  3:50 PM CST   Nurse Only with FZ ALLERGY SHOTS   [facility] ([facility])    [address] [facility]  [place] [address]   [phone]            Jan 30, 2018 12:30 PM CST   Nurse Only with FZ ALLERGY SHOTS   [facility] ([facility])    [address] [facility]  [place] [address]   [phone]              Who to contact     Normal or non-critical lab and imaging results will be communicated to you by Crittercismhart, letter or phone within 4 business days after the clinic has received the results. If you do not hear from us within 7 days, please contact the clinic through [employer] or phone. If you have a critical or abnormal lab result, we will notify you by phone as soon as possible.  Submit refill requests through [employer] or call your pharmacy and they will forward the refill request to us. Please allow 3 business days for your refill to be completed.          If you need to speak with a  for additional information , please call: [phone]             Additional Information About Your Visit        [employer] Information     [employer] lets you send messages to your doctor, view your test results, renew your prescriptions, schedule appointments and more. To sign up, go to [email or web address] . Click on \"Log in\" on the left side of the screen, which will take you to the Welcome page. Then " "click on \"Sign up Now\" on the right side of the page.     You will be asked to enter the access code listed below, as well as some personal information. Please follow the directions to create your username and password.     Your access code is: [email or web address]  Expires: 3/27/2018 12:38 PM     Your access code will  in 90 days. If you need help or a new code, please call your [facility] clinic or [phone].        Care EveryWhere ID     This is your Care EveryWhere ID. This could be used by other organizations to access your [facility] medical records  [id number]        Your Vitals Were     Pulse Temperature Height Last Period Breastfeeding? BMI (Body Mass Index)    92 97.8  F (36.6  C) (Tympanic) 5' 1\" (1.549 m) 2017 No 26.64 kg/m2       Blood Pressure from Last 3 Encounters:   18 125/81   17 109/70   06/15/17 104/71    Weight from Last 3 Encounters:   18 141 lb (64 kg)   17 142 lb 9.6 oz (64.7 kg)   06/15/17 138 lb 6.4 oz (62.8 kg)              We Performed the Following     Glucose     Lipid Profile (Chol, Trig, HDL, LDL calc)     TSH with free T4 reflex     Wet prep          Today's Medication Changes          These changes are accurate as of: 18  3:33 PM.  If you have any questions, ask your nurse or doctor.               Start taking these medicines.        Dose/Directions    fluconazole 150 MG tablet   Commonly known as:  DIFLUCAN   Used for:  Yeast infection of the vagina   Started by:  Barbara Jack NP        Dose:  150 mg   Take 1 tablet (150 mg) by mouth once for 1 dose   Quantity:  1 tablet   Refills:  0            Where to get your medicines      These [id number] [place] - [address]  [address] [place] [address]     Phone:  [phone]     fluconazole 150 MG tablet    omeprazole 20 MG CR capsule                Primary Care Provider Office Phone # Fax #    Paul Weber PA-C [phone]       " [address]  [place] [address]        Equal Access to Services     MEGHAN BRUNO : Hadii aad ku hadsangitao Soomaali, waaxda luqadaha, qaybta kaalmada adeyanira, rema velazco suzanapurva albarranakanksha roman la'daronapurva . So [facility] [phone].    ATENCIÓN: Si habla español, tiene a arteaga disposición servicios gratuitos de asistencia lingüística. Llame al [phone].    We comply with applicable federal civil rights laws and [place] laws. We do not discriminate on the basis of race, color, national origin, age, disability, sex, sexual orientation, or gender identity.            Thank you!     Thank you for choosing [facility]  for your care. Our goal is always to provide you with excellent care. Hearing back from our patients is one way we can continue to improve our services. Please take a few minutes to complete the written survey that you may receive in the mail after your visit with us. Thank you!             Your Updated Medication List - Protect others around you: Learn how to safely use, store and throw away your medicines at www.disposemymeds.org.          This list is accurate as of: 1/2/18  3:33 PM.  Always use your most recent med list.                   Brand Name Dispense Instructions for use Diagnosis    * ALLERGEN IMMUNOTHERAPY PRESCRIPTION     5 mL    Name of Mix: Mix #1  Mold Alternaria Tenuis GLY 1:10 w/v, HS  0.5 ml Hormodendrum Cladosporioides 1:10 w/v, HS 0.5 ml Diluent: HSA qs to 5ml    Chronic seasonal allergic rhinitis due to pollen, Allergic rhinitis due to mold       * ALLERGEN IMMUNOTHERAPY PRESCRIPTION     5 mL    Name of Mix: Mix #2  Tree  Sangita, White  GLY 1:20 w/v, HS  0.5 ml Birch Mix GLY 1:20 w/v, HS  0.5 ml Boxelder-Maple  Mix BHR (Boxelder Hard Red) 1:20 w/v, HS  0.5 ml [place], Common GLY 1:20 w/v, HS  0.5 ml Elm, American GLY 1:20 w/v, HS  0.5 ml Hackberry GLY 1:20 w/v, HS 0.5 ml Hickory, Shagbark GLY 1:20 w/v, HS  0.5 ml Meridian Mix GLY 1:20 w/v, HS 0.5 ml [place], Black GLY 1:20  w/v, HS 0.5 ml Diluent: HSA qs to 5ml    Chronic seasonal allergic rhinitis due to pollen, Allergic rhinitis due to mold       * ALLERGEN IMMUNOTHERAPY PRESCRIPTION     5 mL    Name of Mix: Mix #3  Weeds Nettle GLY 1:20 w/v, HS 0.5 ml Pigweed, Careless GLY 1:20 w/v, HS 0.5 ml Plantain, English GLY 1:20 w/v, HS 0.5 ml Diluent: HSA qs to 5ml    Chronic seasonal allergic rhinitis due to pollen, Allergic rhinitis due to mold       EPINEPHrine 0.3 MG/0.3ML injection 2-pack    AUVI-Q    2 mL    Inject 0.3 mLs (0.3 mg) into the muscle as needed for anaphylaxis    Need for desensitization to allergens       fluconazole 150 MG tablet    DIFLUCAN    1 tablet    Take 1 tablet (150 mg) by mouth once for 1 dose    Yeast infection of the vagina       fluticasone 50 MCG/ACT spray    FLONASE    3 Bottle    Spray 1-2 sprays into both nostrils daily    Seasonal allergic rhinitis, unspecified allergic rhinitis trigger       omeprazole 20 MG CR capsule    priLOSEC    90 capsule    Take 1 capsule (20 mg) by mouth daily    Abdominal pain, epigastric       * Notice:  This list has 3 medication(s) that are the same as other medications prescribed for you. Read the directions carefully, and ask your doctor or other care provider to review them with you.

## 2018-01-04 NOTE — PROGRESS NOTES
Yvan Bowens,    Thank you for your recent office visit.    Here are your recent results.  Normal lab results.     Feel free to contact me via [employer] or call the clinic at [phone].    Sincerely,    TAYLER Tse, FNP-BC

## 2018-01-09 ENCOUNTER — TELEPHONE (OUTPATIENT)
Dept: FAMILY MEDICINE | Facility: CLINIC | Age: 38
End: 2018-01-09

## 2018-01-09 DIAGNOSIS — T85.848D PAIN FROM BREAST IMPLANT, SUBSEQUENT ENCOUNTER: Primary | ICD-10-CM

## 2018-01-11 NOTE — TELEPHONE ENCOUNTER
Patient saw  on 11/20/17 for breast pain, implants put in 2012. Imaging results in epic, patient is still having breast pain and would like to consult with a plastic surgeon. Routing to  for review.

## 2018-01-16 ENCOUNTER — ALLIED HEALTH/NURSE VISIT (OUTPATIENT)
Dept: ALLERGY | Facility: CLINIC | Age: 38
End: 2018-01-16
Payer: COMMERCIAL

## 2018-01-16 DIAGNOSIS — J30.1 ALLERGIC RHINITIS DUE TO POLLEN: Primary | ICD-10-CM

## 2018-01-16 PROCEDURE — 99207 ZZC DROP WITH A PROCEDURE: CPT

## 2018-01-16 PROCEDURE — 95117 IMMUNOTHERAPY INJECTIONS: CPT

## 2018-01-16 NOTE — MR AVS SNAPSHOT
After Visit Summary   1/16/2018    Gogo Bourgeois    MRN: [id number]           Patient Information     Date Of Birth          1980        Visit Information        Provider Department      1/16/2018 12:50 PM FZ ALLERGY SHOTS [facility]        Today's Diagnoses     Allergic rhinitis due to pollen    -  1       Follow-ups after your visit        Your next 10 appointments already scheduled     Jan 23, 2018  8:30 AM CST   (Arrive by 8:15 AM)   New Patient Visit with JULIANA Rosales MD   [facility] ([facility])    [address] [place] [address]  [address]  [facility] [address]   [phone]            Jan 23, 2018  3:50 PM CST   Nurse Only with FZ ALLERGY SHOTS   [facility] ([facility]    [address] [facility] [address] [phone]            Jan 30, 2018 12:30 PM CST   Nurse Only with FZ ALLERGY SHOTS   [facility] ([facility])    [address] [facility] [address] [phone]              Who to contact     If you have questions or need follow up information about today's clinic visit or your schedule please contact [facility] directly at [phone].  Normal or non-critical lab and imaging results will be communicated to you by MyChart, letter or phone within 4 business days after the clinic has received the results. If you do not hear from us within 7 days, please contact the clinic through MyChart or phone. If you have a critical or abnormal lab result, we will notify you by phone as soon as possible.  Submit refill requests through [employer] or call your pharmacy and they will forward the refill request to us. Please allow 3 business days for your refill to be completed.          Additional Information About Your Visit        [employer] Information     [employer] lets you send messages to your doctor, view your test results, renew your prescriptions,  "schedule appointments and more. To sign up, go to [email or web address] . Click on \"Log in\" on the left side of the screen, which will take you to the Welcome page. Then click on \"Sign up Now\" on the right side of the page.     You will be asked to enter the access code listed below, as well as some personal information. Please follow the directions to create your username and password.     Your access code is: [email or web address]  Expires: 3/27/2018 12:38 PM     Your access code will  in 90 days. If you need help or a new code, please call your [facility] clinic or [phone].        Care EveryWhere ID     This is your Care EveryWhere ID. This could be used by other organizations to access your [facility] medical records  [id number]        Your Vitals Were     Last Period                   2017            Blood Pressure from Last 3 Encounters:   18 125/81   17 109/70   06/15/17 104/71    Weight from Last 3 Encounters:   18 64 kg (141 lb)   17 64.7 kg (142 lb 9.6 oz)   06/15/17 62.8 kg (138 lb 6.4 oz)              We Performed the Following     Allergy Shot: Two or more injections        Primary Care Provider Office Phone # Fax #    Paul Weber PA-C [phone]       [address]  [place] [address]        Equal Access to Services     [facility]: Hadii aad ku hadasho Soomaali, waaxda luqadaha, qaybta kaalmada adeegyada, rema velazco haydaronn micaela negron . So [facility] [phone].    ATENCIÓN: Si habla toryañol, tiene a arteaga disposición servicios gratuitos de asistencia lingüística. Llame al [phone].    We comply with applicable federal civil rights laws and [place] laws. We do not discriminate on the basis of race, color, national origin, age, disability, sex, sexual orientation, or gender identity.            Thank you!     Thank you for choosing [facility] [place]  for your care. Our goal is always to provide you with excellent care. Hearing " back from our patients is one way we can continue to improve our services. Please take a few minutes to complete the written survey that you may receive in the mail after your visit with us. Thank you!             Your Updated Medication List - Protect others around you: Learn how to safely use, store and throw away your medicines at www.disposemymeds.org.          This list is accurate as of: 1/16/18  1:48 PM.  Always use your most recent med list.                   Brand Name Dispense Instructions for use Diagnosis    * ALLERGEN IMMUNOTHERAPY PRESCRIPTION     5 mL    Name of Mix: Mix #1  Mold Alternaria Tenuis GLY 1:10 w/v, HS  0.5 ml Hormodendrum Cladosporioides 1:10 w/v, HS 0.5 ml Diluent: HSA qs to 5ml    Chronic seasonal allergic rhinitis due to pollen, Allergic rhinitis due to mold       * ALLERGEN IMMUNOTHERAPY PRESCRIPTION     5 mL    Name of Mix: Mix #2  Tree  Julio Cesar, White  GLY 1:20 w/v, HS  0.5 ml Birch Mix GLY 1:20 w/v, HS  0.5 ml Boxelder-Maple  Mix BHR (Boxelder Hard Red) 1:20 w/v, HS  0.5 ml Dunn, Common GLY 1:20 w/v, HS  0.5 ml Elm, American GLY 1:20 w/v, HS  0.5 ml Hackberry GLY 1:20 w/v, HS 0.5 ml Hickory, Shagbark GLY 1:20 w/v, HS  0.5 ml G[place] Mix GLY 1:20 w/v, HS 0.5 ml [place], Black GLY 1:20 w/v, HS 0.5 ml Diluent: HSA qs to 5ml    Chronic seasonal allergic rhinitis due to pollen, Allergic rhinitis due to mold       * ALLERGEN IMMUNOTHERAPY PRESCRIPTION     5 mL    Name of Mix: Mix #3  Weeds Nettle GLY 1:20 w/v, HS 0.5 ml Pigweed, Careless GLY 1:20 w/v, HS 0.5 ml Plantain, English GLY 1:20 w/v, HS 0.5 ml Diluent: HSA qs to 5ml    Chronic seasonal allergic rhinitis due to pollen, Allergic rhinitis due to mold       EPINEPHrine 0.3 MG/0.3ML injection 2-pack    AUVI-Q    2 mL    Inject 0.3 mLs (0.3 mg) into the muscle as needed for anaphylaxis    Need for desensitization to allergens       fluticasone 50 MCG/ACT spray    FLONASE    3 Bottle    Spray 1-2 sprays into both nostrils daily     Seasonal allergic rhinitis, unspecified allergic rhinitis trigger       omeprazole 20 MG CR capsule    priLOSEC    90 capsule    Take 1 capsule (20 mg) by mouth daily    Abdominal pain, epigastric       * Notice:  This list has 3 medication(s) that are the same as other medications prescribed for you. Read the directions carefully, and ask your doctor or other care provider to review them with you.

## 2018-01-16 NOTE — PROGRESS NOTES
Patient presented after waiting 30 minutes with no reaction to allergy injections. Discharged from clinic.  Lori Nguyen RN ............   1/16/2018...1:48 PM

## 2018-01-23 ENCOUNTER — ALLIED HEALTH/NURSE VISIT (OUTPATIENT)
Dept: ALLERGY | Facility: CLINIC | Age: 38
End: 2018-01-23
Payer: COMMERCIAL

## 2018-01-23 ENCOUNTER — OFFICE VISIT (OUTPATIENT)
Dept: PLASTIC SURGERY | Facility: CLINIC | Age: 38
End: 2018-01-23
Attending: PLASTIC SURGERY
Payer: COMMERCIAL

## 2018-01-23 DIAGNOSIS — J30.1 ALLERGIC RHINITIS DUE TO POLLEN: Primary | ICD-10-CM

## 2018-01-23 DIAGNOSIS — N64.4 BREAST PAIN: Primary | ICD-10-CM

## 2018-01-23 PROCEDURE — 99207 ZZC DROP WITH A PROCEDURE: CPT

## 2018-01-23 PROCEDURE — 95117 IMMUNOTHERAPY INJECTIONS: CPT

## 2018-01-23 PROCEDURE — G0463 HOSPITAL OUTPT CLINIC VISIT: HCPCS | Mod: ZF

## 2018-01-23 NOTE — NURSING NOTE
"Oncology Rooming Note    January 23, 2018 8:22 AM   Gogo Bourgeois is a 53 year old female who presents for: Oncology Clinic Visit    Initial Vitals: LMP 12/26/2017 Estimated body mass index is 26.64 kg/(m^2) as calculated from the following:    Height as of 1/2/18: 1.549 m (5' 1\").    Weight as of 1/2/18: 64 kg (141 lb). There is no height or weight on file to calculate BSA.  Data Unavailable Comment: discomfort in right breast    Patient's last menstrual period was 12/26/2017.  Allergies reviewed: Yes  Medications reviewed: Yes    Medications: Medication refills not needed today.  Pharmacy name entered into [facility]:    [facility] - [place], [place] - [address]  [facility] [place] - [address] [facility] [address] [facility] [place] [address]  [facility] [address] [place] [address]  [facility] [address] [place], [place] - [address]  [place] - [place], [place] - [address] [place] [address] ALLERGY PHARMACY    Clinical concerns:No vitals needed per Dr Rosales     Pt received flu shot elsewhere. See Immunizations       6 minutes for nursing intake (face to face time)     Ramya Wright CMA                              "

## 2018-01-23 NOTE — LETTER
1/23/2018       RE: Gogo Bourgeois  [address] [facility] [address]     Dear Colleague,    Thank you for referring your patient, Gogo Bourgeois, to the [facility] at [facility]. Please see a copy of my visit note below.    REFERRING PHYSICIAN:  Magalis Lema MD.      PRESENTING COMPLAINT:  Consultation for right breast pain.      HISTORY OF PRESENT ILLNESS:  Ms. Bourgeois is 37 years old.  Back in 2011, she underwent a gastric sleeve procedure, lost a lot of weight, went from 215 pounds down to 140 pounds.  She was a C cup prior to that and went down to an AA cup.  In 2012 in [place], she underwent a mastopexy augmentation along with liposuction, abdominoplasty and arm lifts.  The patient went from an AA to a DDD bra.  She had 440 mL Allergan silicone implants placed for periareolar incision and seems like she only had a periareolar mastopexy.  Nonetheless, she was very happy with the overall results and over the years did breast-feed and then about 2 months ago noticed that her right breast started having some nonspecific discomfort on day-to-day activities.  This has remained relatively stable and the pain is minimal, but nonetheless it concerned her.  She was seen by her primary care physician who ordered a mammogram and an ultrasound which came back normal in 12/2017.  She is here to get my opinion regarding her issues.      PAST MEDICAL HISTORY:  Nil.      PAST SURGICAL HISTORY:  Laparoscopic cholecystectomy, tubal ligation along with breast augmentation mastopexy, liposuction, abdominoplasty, arm lift.      MEDICATIONS:  Nil.      ALLERGIES:  Nil.      SOCIAL HISTORY:  Does not smoke, socially drinks.  Lives in [place].  Is a  at [employer].      REVIEW OF SYSTEMS:  Denies chest pain, shortness of breath, MI, CVA, DVT and PE.      PHYSICAL EXAMINATION: Vital signs stable.  She is afebrile, in no obvious distress.  She is 5 feet 1  inch, 141 pounds, BMI of 26.7 kg/m2.  On examination of her breasts, she has relatively symmetric sized breasts, although slightly large for her body frame.  The nipples are in relatively similar position.  She has the snoopy dog appearance of each breast with the native breast tissue ptotic over the underlying implants, more on the left than on the right.  She has grade 2 capsular contracture bilaterally.  She has no tenderness.  She has no lumps in either breast.  She has a normal palpable implant bilaterally.  The distortion in the right breast is not visible that I would see in significant capsular contracture.  If anything, the left breast is slightly more upper pole fullness compared to the right side.      ASSESSMENT AND PLAN:  Based on above findings, a diagnosis of status post bilateral mastopexy augmentation with nonspecific right-sided breast implant pain was made.  At this moment in time, I do not feel or see anything pathologic.  Most likely the pain is related to the underlying scar/early capsular contracture, although clinically there is no evidence for distortion, no evidence for any extracapsular rupture, and the mammograms and ultrasounds are normal.  I reassured the patient one option is to get an MRI to ensure that there is no obvious rupture of the implants, although the rupture in of itself is not related to any significant medical pathology for the patient and this was explained to her.  Another option is to just follow this very closely over the ensuing months and if her pain continues to worsen or the area becomes firmer or it starts to distort the right breast, then I would recommend capsulectomy and implant replacement for grade III/IV capsular contracture.  She understood the plan and agreed.  All questions were answered.  All exam and discussion done in the presence of my nurse.  I will see her back on a p.r.n. basis.      Total time spent with the patient was 30 minutes, more than half  was counseling.        Again, thank you for allowing me to participate in the care of your patient.      Sincerely,    JULIANA Rosales MD    cc:   Magalis Lema MD   [address]  [address]

## 2018-01-23 NOTE — PROGRESS NOTES
Patient presented after waiting 30 minutes with no reaction to allergy injections. Discharged from clinic.  Lori Nguyen RN ............   1/23/2018...4:25 PM

## 2018-01-23 NOTE — PROGRESS NOTES
REFERRING PHYSICIAN:  Magalis Lema MD.      PRESENTING COMPLAINT:  Consultation for right breast pain.      HISTORY OF PRESENT ILLNESS:  Ms. Bourgeois is 37 years old.  Back in 2011, she underwent a gastric sleeve procedure, lost a lot of weight, went from 215 pounds down to 140 pounds.  She was a C cup prior to that and went down to an AA cup.  In 2012 in [place], she underwent a mastopexy augmentation along with liposuction, abdominoplasty and arm lifts.  The patient went from an AA to a DDD bra.  She had 440 mL Allergan silicone implants placed for periareolar incision and seems like she only had a periareolar mastopexy.  Nonetheless, she was very happy with the overall results and over the years did breast-feed and then about 2 months ago noticed that her right breast started having some nonspecific discomfort on day-to-day activities.  This has remained relatively stable and the pain is minimal, but nonetheless it concerned her.  She was seen by her primary care physician who ordered a mammogram and an ultrasound which came back normal in 12/2017.  She is here to get my opinion regarding her issues.      PAST MEDICAL HISTORY:  Nil.      PAST SURGICAL HISTORY:  Laparoscopic cholecystectomy, tubal ligation along with breast augmentation mastopexy, liposuction, abdominoplasty, arm lift.      MEDICATIONS:  Nil.      ALLERGIES:  Nil.      SOCIAL HISTORY:  Does not smoke, socially drinks.  Lives in [place].  Is a  at [employer].      REVIEW OF SYSTEMS:  Denies chest pain, shortness of breath, MI, CVA, DVT and PE.      PHYSICAL EXAMINATION: Vital signs stable.  She is afebrile, in no obvious distress.  She is 5 feet 1 inch, 141 pounds, BMI of 26.7 kg/m2.  On examination of her breasts, she has relatively symmetric sized breasts, although slightly large for her body frame.  The nipples are in relatively similar position.  She has the snoopy dog appearance of each breast with the native breast tissue  ptotic over the underlying implants, more on the left than on the right.  She has grade 2 capsular contracture bilaterally.  She has no tenderness.  She has no lumps in either breast.  She has a normal palpable implant bilaterally.  The distortion in the right breast is not visible that I would see in significant capsular contracture.  If anything, the left breast is slightly more upper pole fullness compared to the right side.      ASSESSMENT AND PLAN:  Based on above findings, a diagnosis of status post bilateral mastopexy augmentation with nonspecific right-sided breast implant pain was made.  At this moment in time, I do not feel or see anything pathologic.  Most likely the pain is related to the underlying scar/early capsular contracture, although clinically there is no evidence for distortion, no evidence for any extracapsular rupture, and the mammograms and ultrasounds are normal.  I reassured the patient one option is to get an MRI to ensure that there is no obvious rupture of the implants, although the rupture in of itself is not related to any significant medical pathology for the patient and this was explained to her.  Another option is to just follow this very closely over the ensuing months and if her pain continues to worsen or the area becomes firmer or it starts to distort the right breast, then I would recommend capsulectomy and implant replacement for grade III/IV capsular contracture.  She understood the plan and agreed.  All questions were answered.  All exam and discussion done in the presence of my nurse.  I will see her back on a p.r.n. basis.      Total time spent with the patient was 30 minutes, more than half was counseling.      cc:   Magalis Lema MD   [address]  [address]

## 2018-01-23 NOTE — MR AVS SNAPSHOT
"              After Visit Summary   1/23/2018    Gogo Bourgeois    MRN: [id number]           Patient Information     Date Of Birth          1980        Visit Information        Provider Department      1/23/2018 3:50 PM [facility]        Today's Diagnoses     Allergic rhinitis due to pollen    -  1       Follow-ups after your visit        Your next 10 appointments already scheduled     Jan 30, 2018 12:30 PM CST   Nurse Only with [facility]Z ALLERGY [facility]OTS   [facility] ([facility]    [address] [address] [phone]              Who to contact     If you have questions or need follow up information about today's clinic visit or your schedule please contact [facility] directly at [phone].  Normal or non-critical lab and imaging results will be communicated to you by BioGreen Teckhart, letter or phone within 4 business days after the clinic has received the results. If you do not hear from us within 7 days, please contact the clinic through MyChart or phone. If you have a critical or abnormal lab result, we will notify you by phone as soon as possible.  Submit refill requests through [email or web address] or call your pharmacy and they will forward the refill request to us. Please allow 3 business days for your refill to be completed.          Additional Information About Your Visit        BioGreen Teckhart Information     [email or web address] lets you send messages to your doctor, view your test results, renew your prescriptions, schedule appointments and more. To sign up, go to [email or web address] . Click on \"Log in\" on the left side of the screen, which will take you to the Welcome page. Then click on \"Sign up Now\" on the right side of the page.     You will be asked to enter the access code listed below, as well as some personal information. Please follow the directions to create your username and password.     Your access code is: [email or web address]  Expires: " 3/27/2018 12:38 PM     Your access code will  in 90 days. If you need help or a new code, please call your [place] clinic or [phone].        Care EveryWhere ID     This is your Care EveryWhere ID. This could be used by other organizations to access your [place] medical records  [id number]        Your Vitals Were     Last Period                   2017            Blood Pressure from Last 3 Encounters:   18 125/81   17 109/70   06/15/17 104/71    Weight from Last 3 Encounters:   18 64 kg (141 lb)   17 64.7 kg (142 lb 9.6 oz)   06/15/17 62.8 kg (138 lb 6.4 oz)              We Performed the Following     Allergy Shot: Two or more injections        Primary Care Provider Office Phone # Fax #    Paul Kimberlyn Weber PA-C [phone]       [address]  [place] [address]        Equal Access to Services     [facility]: Hadii aad ku hadasho Soomaali, waaxda luqadaha, qaybta kaalmada adeegyada, waxay idiin hayaan adeeg kharash la'chente . So [facility] [phone].    ATENCIÓN: Si habla español, tiene a arteaga disposición servicios gratuitos de asistencia lingüística. Ll[facility] [phone].    We comply with applicable federal civil rights laws and [place] laws. We do not discriminate on the basis of race, color, national origin, age, disability, sex, sexual orientation, or gender identity.            Thank you!     Thank you for choosing [facility]  for your care. Our goal is always to provide you with excellent care. Hearing back from our patients is one way we can continue to improve our services. Please take a few minutes to complete the written survey that you may receive in the mail after your visit with us. Thank you!             Your Updated Medication List - Protect others around you: Learn how to safely use, store and throw away your medicines at www.disposemymeds.org.          This list is accurate as of: 18  4:25 PM.  Always use your most  recent med list.                   Brand Name Dispense Instructions for use Diagnosis    * ALLERGEN IMMUNOTHERAPY PRESCRIPTION     5 mL    Name of Mix: Mix #1  Mold Alternaria Tenuis GLY 1:10 w/v, HS  0.5 ml Hormodendrum Cladosporioides 1:10 w/v, HS 0.5 ml Diluent: HSA qs to 5ml    Chronic seasonal allergic rhinitis due to pollen, Allergic rhinitis due to mold       * ALLERGEN IMMUNOTHERAPY PRESCRIPTION     5 mL    Name of Mix: Mix #2  Tree  Julio Cesar, White  GLY 1:20 w/v, HS  0.5 ml Birch Mix GLY 1:20 w/v, HS  0.5 ml Boxelder-Maple  Mix BHR (Boxelder Hard Red) 1:20 w/v, HS  0.5 ml [place], Common GLY 1:20 w/v, HS  0.5 ml Elm, American GLY 1:20 w/v, HS  0.5 ml Hackberry GLY 1:20 w/v, HS 0.5 ml Hickory, Shagbark GLY 1:20 w/v, HS  0.5 ml [place]olt Mix GLY 1:20 w/v, HS 0.5 ml [place], Black GLY 1:20 w/v, HS 0.5 ml Diluent: HSA qs to 5ml    Chronic seasonal allergic rhinitis due to pollen, Allergic rhinitis due to mold       * ALLERGEN IMMUNOTHERAPY PRESCRIPTION     5 mL    Name of Mix: Mix #3  Weeds Nettle GLY 1:20 w/v, HS 0.5 ml Pigweed, Careless GLY 1:20 w/v, HS 0.5 ml Plantain, English GLY 1:20 w/v, HS 0.5 ml Diluent: HSA qs to 5ml    Chronic seasonal allergic rhinitis due to pollen, Allergic rhinitis due to mold       EPINEPHrine 0.3 MG/0.3ML injection 2-pack    AUVI-Q    2 mL    Inject 0.3 mLs (0.3 mg) into the muscle as needed for anaphylaxis    Need for desensitization to allergens       fluticasone 50 MCG/ACT spray    FLONASE    3 Bottle    Spray 1-2 sprays into both nostrils daily    Seasonal allergic rhinitis, unspecified allergic rhinitis trigger       omeprazole 20 MG CR capsule    priLOSEC    90 capsule    Take 1 capsule (20 mg) by mouth daily    Abdominal pain, epigastric       * Notice:  This list has 3 medication(s) that are the same as other medications prescribed for you. Read the directions carefully, and ask your doctor or other care provider to review them with you.

## 2018-01-23 NOTE — MR AVS SNAPSHOT
"              After Visit Summary   1/23/2018    Gogo Bourgeois    MRN: [id number]           Patient Information     Date Of Birth          1980        Visit Information        Provider Department      1/23/2018 8:30 AM JULIANA Rosales MD [facility]        Today's Diagnoses     Breast pain    -  1       Follow-ups after your visit        Follow-up notes from your care team     Return if symptoms worsen or fail to improve.      Your next 10 appointments already scheduled     Jan 23, 2018  3:50 PM CST   Nurse Only with FZ ALLERGY SHOTS   [facility] ([address] [address]   [phone]            Jan 30, 2018 12:30 PM CST   Nurse Only with FZ ALLERGY SHOTS   [facility] ([facility]    [address] [facility] [address]   [phone]              Who to contact     If you have questions or need follow up information about today's clinic visit or your schedule please contact [facility] directly at [phone].  Normal or non-critical lab and imaging results will be communicated to you by Prescribe Wellnesshart, letter or phone within 4 business days after the clinic has received the results. If you do not hear from us within 7 days, please contact the clinic through Prescribe Wellnesshart or phone. If you have a critical or abnormal lab result, we will notify you by phone as soon as possible.  Submit refill requests through [employer] or call your pharmacy and they will forward the refill request to us. Please allow 3 business days for your refill to be completed.          Additional Information About Your Visit        Prescribe Wellnesshart Information     One[employer] Virtual lets you send messages to your doctor, view your test results, renew your prescriptions, schedule appointments and more. To sign up, go to [email or web address] . Click on \"Log in\" on the left side of the screen, which will take you to the Welcome page. Then click " "on \"Sign up Now\" on the right side of the page.     You will be asked to enter the access code listed below, as well as some personal information. Please follow the directions to create your username and password.     Your access code is: [email or web address]  Expires: 3/27/2018 12:38 PM     Your access code will  in 90 days. If you need help or a new code, please call your [place] [employer] clinic or [phone].        Care EveryWhere ID     This is your Care EveryWhere ID. This could be used by other organizations to access your [employer] medical records  [id number]        Your Vitals Were     Last Period                   2017            Blood Pressure from Last 3 Encounters:   18 125/81   17 109/70   06/15/17 104/71    Weight from Last 3 Encounters:   18 141 lb   17 142 lb 9.6 oz   06/15/17 138 lb 6.4 oz              Today, you had the following     No orders found for display       Primary Care Provider Office Phone # Fax #    Paul Weber PA-C [phone]       [address] [facility] [address]        Equal Access to Services     [facility] : Hadii aad ku hadasho Soomaali, waaxda luqadaha, qaybta kaalmada adeegyada, waxay janisin haydaronn micaela negron . So [facility] [phone].    ATENCIÓN: Si habla español, tiene a arteaga disposición servicios gratuitos de asistencia lingüística. Llame al [phone].    We comply with applicable federal civil rights laws and [place] laws. We do not discriminate on the basis of race, color, national origin, age, disability, sex, sexual orientation, or gender identity.            Thank you!     Thank you for choosing [facility]  for your care. Our goal is always to provide you with excellent care. Hearing back from our patients is one way we can continue to improve our services. Please take a few minutes to complete the written survey that you may receive in the mail after your visit with us. Thank you!           "   Your Updated Medication List - Protect others around you: Learn how to safely use, store and throw away your medicines at www.disposemymeds.org.          This list is accurate as of: 1/23/18 11:18 AM.  Always use your most recent med list.                   Brand Name Dispense Instructions for use Diagnosis    * ALLERGEN IMMUNOTHERAPY PRESCRIPTION     5 mL    Name of Mix: Mix #1  Mold Alternaria Tenuis GLY 1:10 w/v, HS  0.5 ml Hormodendrum Cladosporioides 1:10 w/v, HS 0.5 ml Diluent: HSA qs to 5ml    Chronic seasonal allergic rhinitis due to pollen, Allergic rhinitis due to mold       * ALLERGEN IMMUNOTHERAPY PRESCRIPTION     5 mL    Name of Mix: Mix #2  Tree  Julio Cesar, White  GLY 1:20 w/v, HS  0.5 ml Birch Mix GLY 1:20 w/v, HS  0.5 ml Boxelder-Maple  Mix BHR (Boxelder Hard Red) 1:20 w/v, HS  0.5 ml [place], Common GLY 1:20 w/v, HS  0.5 ml Elm, American GLY 1:20 w/v, HS  0.5 ml Hackberry GLY 1:20 w/v, HS 0.5 ml Hickory, Shagbark GLY 1:20 w/v, HS  0.5 ml Dugger Mix GLY 1:20 w/v, HS 0.5 ml [place], Black GLY 1:20 w/v, HS 0.5 ml Diluent: HSA qs to 5ml    Chronic seasonal allergic rhinitis due to pollen, Allergic rhinitis due to mold       * ALLERGEN IMMUNOTHERAPY PRESCRIPTION     5 mL    Name of Mix: Mix #3  Weeds Nettle GLY 1:20 w/v, HS 0.5 ml Pigweed, Careless GLY 1:20 w/v, HS 0.5 ml Plantain, English GLY 1:20 w/v, HS 0.5 ml Diluent: HSA qs to 5ml    Chronic seasonal allergic rhinitis due to pollen, Allergic rhinitis due to mold       EPINEPHrine 0.3 MG/0.3ML injection 2-pack    AUVI-Q    2 mL    Inject 0.3 mLs (0.3 mg) into the muscle as needed for anaphylaxis    Need for desensitization to allergens       fluticasone 50 MCG/ACT spray    FLONASE    3 Bottle    Spray 1-2 sprays into both nostrils daily    Seasonal allergic rhinitis, unspecified allergic rhinitis trigger       omeprazole 20 MG CR capsule    priLOSEC    90 capsule    Take 1 capsule (20 mg) by mouth daily    Abdominal pain, epigastric       * Notice:   This list has 3 medication(s) that are the same as other medications prescribed for you. Read the directions carefully, and ask your doctor or other care provider to review them with you.

## 2018-01-30 ENCOUNTER — ALLIED HEALTH/NURSE VISIT (OUTPATIENT)
Dept: ALLERGY | Facility: CLINIC | Age: 38
End: 2018-01-30
Payer: COMMERCIAL

## 2018-01-30 DIAGNOSIS — J30.1 ALLERGIC RHINITIS DUE TO POLLEN: Primary | ICD-10-CM

## 2018-01-30 PROCEDURE — 95117 IMMUNOTHERAPY INJECTIONS: CPT

## 2018-01-30 PROCEDURE — 99207 ZZC DROP WITH A PROCEDURE: CPT

## 2018-01-30 NOTE — MR AVS SNAPSHOT
After Visit Summary   1/30/2018    Gogo Bourgeois    MRN: [id number]           Patient Information     Date Of Birth          1980        Visit Information        Provider Department      1/30/2018 12:30 PM FZ ALLERGY SHOTS [facility]        Today's Diagnoses     Allergic rhinitis due to pollen    -  1       Follow-ups after your visit        Your next 10 appointments already scheduled     Feb 06, 2018  3:50 PM CST   Nurse Only with FZ ALLERGY SHOTS   [facility] ([facility])    [address] [address]   [phone]            Feb 13, 2018 12:40 PM CST   Nurse Only with FZ ALLERGY SHOTS   [facility] ([facility])    [address]  [place] [address]   [phone]            Feb 20, 2018  3:50 PM CST   Nurse Only with FZ ALLERGY SHOTS   [facility] ([facility])    [address]  [place] [address]   [phone]            Feb 27, 2018 12:40 PM CST   Nurse Only with FZ ALLERGY SHOTS   [facility] ([facility])    [address] [address]   [phone]              Who to contact     If you have questions or need follow up information about today's clinic visit or your schedule please contact [facility] directly at [phone].  Normal or non-critical lab and imaging results will be communicated to you by MyChart, letter or phone within 4 business days after the clinic has received the results. If you do not hear from us within 7 days, please contact the clinic through [employer] or phone. If you have a critical or abnormal lab result, we will notify you by phone as soon as possible.  Submit refill requests through E[employer] or call your pharmacy and they will forward the refill request to us. Please allow 3 business days for your refill to be completed.          Additional Information About Your Visit    "     MyChart Information     [employer] lets you send messages to your doctor, view your test results, renew your prescriptions, schedule appointments and more. To sign up, go to [email or web address] . Click on \"Log in\" on the left side of the screen, which will take you to the Welcome page. Then click on \"Sign up Now\" on the right side of the page.     You will be asked to enter the access code listed below, as well as some personal information. Please follow the directions to create your username and password.     Your access code is: [email or web address]  Expires: 3/27/2018 12:38 PM     Your access code will  in 90 days. If you need help or a new code, please call your [place] clinic or [phone].        Care EveryWhere ID     This is your Care EveryWhere ID. This could be used by other organizations to access your [place] medical records  [id number]         Blood Pressure from Last 3 Encounters:   18 125/81   17 109/70   06/15/17 104/71    Weight from Last 3 Encounters:   18 64 kg (141 lb)   17 64.7 kg (142 lb 9.6 oz)   06/15/17 62.8 kg (138 lb 6.4 oz)              We Performed the Following     Allergy Shot: Two or more injections        Primary Care Provider Office Phone # Fax #    Paul Weber PA-C [phone]       [address] [facility] [address]        Equal Access to Services     [facility] : Hadii aad ku hadasho Soomaali, waaxda luqadaha, qaybta kaalmada adeegyada, rema negron . So [facility] [phone].    ATENCIÓN: Si habla español, tiene a arteaga disposición servicios gratuitos de asistencia lingüística. Llame al [phone].    We comply with applicable federal civil rights laws and [place] laws. We do not discriminate on the basis of race, color, national origin, age, disability, sex, sexual orientation, or gender identity.            Thank you!     Thank you for choosing [facility] [place]  for your care. Our goal " is always to provide you with excellent care. Hearing back from our patients is one way we can continue to improve our services. Please take a few minutes to complete the written survey that you may receive in the mail after your visit with us. Thank you!             Your Updated Medication List - Protect others around you: Learn how to safely use, store and throw away your medicines at www.disposemymeds.org.          This list is accurate as of 1/30/18  1:33 PM.  Always use your most recent med list.                   Brand Name Dispense Instructions for use Diagnosis    * ALLERGEN IMMUNOTHERAPY PRESCRIPTION     5 mL    Name of Mix: Mix #1  Mold Alternaria Tenuis GLY 1:10 w/v, HS  0.5 ml Hormodendrum Cladosporioides 1:10 w/v, HS 0.5 ml Diluent: HSA qs to 5ml    Chronic seasonal allergic rhinitis due to pollen, Allergic rhinitis due to mold       * ALLERGEN IMMUNOTHERAPY PRESCRIPTION     5 mL    Name of Mix: Mix #2  Tree  Julio Cesar, White  GLY 1:20 w/v, HS  0.5 ml Birch Mix GLY 1:20 w/v, HS  0.5 ml Boxelder-Maple  Mix BHR (Boxelder Hard Red) 1:20 w/v, HS  0.5 ml [place], Common GLY 1:20 w/v, HS  0.5 ml Elm, American GLY 1:20 w/v, HS  0.5 ml Hackberry GLY 1:20 w/v, HS 0.5 ml Hickory, Shagbark GLY 1:20 w/v, HS  0.5 ml [place] Mix GLY 1:20 w/v, HS 0.5 ml [place], Black GLY 1:20 w/v, HS 0.5 ml Diluent: HSA qs to 5ml    Chronic seasonal allergic rhinitis due to pollen, Allergic rhinitis due to mold       * ALLERGEN IMMUNOTHERAPY PRESCRIPTION     5 mL    Name of Mix: Mix #3  Weeds Nettle GLY 1:20 w/v, HS 0.5 ml Pigweed, Careless GLY 1:20 w/v, HS 0.5 ml Plantain, English GLY 1:20 w/v, HS 0.5 ml Diluent: HSA qs to 5ml    Chronic seasonal allergic rhinitis due to pollen, Allergic rhinitis due to mold       EPINEPHrine 0.3 MG/0.3ML injection 2-pack    AUVI-Q    2 mL    Inject 0.3 mLs (0.3 mg) into the muscle as needed for anaphylaxis    Need for desensitization to allergens       fluticasone 50 MCG/ACT spray    FLONASE    3  Bottle    Spray 1-2 sprays into both nostrils daily    Seasonal allergic rhinitis, unspecified allergic rhinitis trigger       omeprazole 20 MG CR capsule    priLOSEC    90 capsule    Take 1 capsule (20 mg) by mouth daily    Abdominal pain, epigastric       * Notice:  This list has 3 medication(s) that are the same as other medications prescribed for you. Read the directions carefully, and ask your doctor or other care provider to review them with you.

## 2018-01-30 NOTE — PROGRESS NOTES
Patient presented after waiting 30 minutes with no reaction to allergy injections. Discharged from clinic.    Tabitha Ordonez RN

## 2018-02-13 ENCOUNTER — ALLIED HEALTH/NURSE VISIT (OUTPATIENT)
Dept: ALLERGY | Facility: CLINIC | Age: 38
End: 2018-02-13
Payer: COMMERCIAL

## 2018-02-13 DIAGNOSIS — J30.9 ALLERGIC RHINITIS: Primary | ICD-10-CM

## 2018-02-13 PROCEDURE — 95117 IMMUNOTHERAPY INJECTIONS: CPT

## 2018-02-13 PROCEDURE — 99207 ZZC DROP WITH A PROCEDURE: CPT

## 2018-02-13 NOTE — PROGRESS NOTES
Patient presented after waiting 30 minutes with no reaction to allergy injections. Discharged from clinic.  Lori Nguyen RN ............   2/13/2018...1:39 PM

## 2018-02-13 NOTE — MR AVS SNAPSHOT
"              After Visit Summary   2/13/2018    Gogo Bourgeois    MRN: [id number]           Patient Information     Date Of Birth          1980        Visit Information        Provider Department      2/13/2018 12:40 PM FZ ALLERGY SHOTS [facility]        Today's Diagnoses     Allergic rhinitis    -  1       Follow-ups after your visit        Your next 10 appointments already scheduled     Feb 27, 2018 12:40 PM CST   Nurse Only with FZ ALLERGY SHOTS   [facility] ([facility])    [address] [address] [phone]              Who to contact     If you have questions or need follow up information about today's clinic visit or your schedule please contact [facility] directly at [phone].  Normal or non-critical lab and imaging results will be communicated to you by MyChart, letter or phone within 4 business days after the clinic has received the results. If you do not hear from us within 7 days, please contact the clinic through MyChart or phone. If you have a critical or abnormal lab result, we will notify you by phone as soon as possible.  Submit refill requests through [employer] or call your pharmacy and they will forward the refill request to us. Please allow 3 business days for your refill to be completed.          Additional Information About Your Visit        MyChart Information     [employer] lets you send messages to your doctor, view your test results, renew your prescriptions, schedule appointments and more. To sign up, go to [email or web address] . Click on \"Log in\" on the left side of the screen, which will take you to the Welcome page. Then click on \"Sign up Now\" on the right side of the page.     You will be asked to enter the access code listed below, as well as some personal information. Please follow the directions to create your username and password.     Your access code is: [email or web address]  Expires: 3/27/2018 12:38 " PM     Your access code will  in 90 days. If you need help or a new code, please call your [place] clinic or [phone].        Care EveryWhere ID     This is your Care EveryWhere ID. This could be used by other organizations to access your [place] medical records  [id number]         Blood Pressure from Last 3 Encounters:   18 125/81   17 109/70   06/15/17 104/71    Weight from Last 3 Encounters:   18 64 kg (141 lb)   17 64.7 kg (142 lb 9.6 oz)   06/15/17 62.8 kg (138 lb 6.4 oz)              We Performed the Following     Allergy Shot: Two or more injections        Primary Care Provider Office Phone # Fax #    Paul Weber PA-C [phone] [address] [place] [address]        Equal Access to Services     [facility]: Hadii aad ku hadasho Soomaali, waaxda luqadaha, qaybta kaalmada adeegyada, waxay idiin hayaan adeeg kharash la'daronn . So [facility] [phone].    ATENCIÓN: Si habla español, tiene a arteaga disposición servicios gratuitos de asistencia lingüística. Ll[facility] [phone].    We comply with applicable federal civil rights laws and [place] laws. We do not discriminate on the basis of race, color, national origin, age, disability, sex, sexual orientation, or gender identity.            Thank you!     Thank you for choosing [facility] [place]  for your care. Our goal is always to provide you with excellent care. Hearing back from our patients is one way we can continue to improve our services. Please take a few minutes to complete the written survey that you may receive in the mail after your visit with us. Thank you!             Your Updated Medication List - Protect others around you: Learn how to safely use, store and throw away your medicines at www.disposemymeds.org.          This list is accurate as of 18  1:39 PM.  Always use your most recent med list.                   Brand Name Dispense Instructions for use Diagnosis    *  ALLERGEN IMMUNOTHERAPY PRESCRIPTION     5 mL    Name of Mix: Mix #1  Mold Alternaria Tenuis GLY 1:10 w/v, HS  0.5 ml Hormodendrum Cladosporioides 1:10 w/v, HS 0.5 ml Diluent: HSA qs to 5ml    Chronic seasonal allergic rhinitis due to pollen, Allergic rhinitis due to mold       * ALLERGEN IMMUNOTHERAPY PRESCRIPTION     5 mL    Name of Mix: Mix #2  Tree  Julio Cesar, White  GLY 1:20 w/v, HS  0.5 ml Birch Mix GLY 1:20 w/v, HS  0.5 ml Boxelder-Maple  Mix BHR (Boxelder Hard Red) 1:20 w/v, HS  0.5 ml [place], Common GLY 1:20 w/v, HS  0.5 ml Elm, American GLY 1:20 w/v, HS  0.5 ml Hackberry GLY 1:20 w/v, HS 0.5 ml Hickory, Shagbark GLY 1:20 w/v, HS  0.5 ml [place] Mix GLY 1:20 w/v, HS 0.5 ml [place], Black GLY 1:20 w/v, HS 0.5 ml Diluent: HSA qs to 5ml    Chronic seasonal allergic rhinitis due to pollen, Allergic rhinitis due to mold       * ALLERGEN IMMUNOTHERAPY PRESCRIPTION     5 mL    Name of Mix: Mix #3  Weeds Nettle GLY 1:20 w/v, HS 0.5 ml Pigweed, Careless GLY 1:20 w/v, HS 0.5 ml Plantain, English GLY 1:20 w/v, HS 0.5 ml Diluent: HSA qs to 5ml    Chronic seasonal allergic rhinitis due to pollen, Allergic rhinitis due to mold       EPINEPHrine 0.3 MG/0.3ML injection 2-pack    AUVI-Q    2 mL    Inject 0.3 mLs (0.3 mg) into the muscle as needed for anaphylaxis    Need for desensitization to allergens       fluticasone 50 MCG/ACT spray    FLONASE    3 Bottle    Spray 1-2 sprays into both nostrils daily    Seasonal allergic rhinitis, unspecified allergic rhinitis trigger       omeprazole 20 MG CR capsule    priLOSEC    90 capsule    Take 1 capsule (20 mg) by mouth daily    Abdominal pain, epigastric       * Notice:  This list has 3 medication(s) that are the same as other medications prescribed for you. Read the directions carefully, and ask your doctor or other care provider to review them with you.

## 2018-02-19 ENCOUNTER — ALLIED HEALTH/NURSE VISIT (OUTPATIENT)
Dept: ALLERGY | Facility: CLINIC | Age: 38
End: 2018-02-19
Payer: COMMERCIAL

## 2018-02-19 DIAGNOSIS — J30.9 ALLERGIC RHINITIS: Primary | ICD-10-CM

## 2018-02-19 PROCEDURE — 99207 ZZC DROP WITH A PROCEDURE: CPT

## 2018-02-19 PROCEDURE — 95117 IMMUNOTHERAPY INJECTIONS: CPT

## 2018-02-19 NOTE — MR AVS SNAPSHOT
After Visit Summary   2/19/2018    Gogo Bourgeois    MRN: [id number]           Patient Information     Date Of Birth          1980        Visit Information        Provider Department      2/19/2018 10:30 AM FZ ALLERGY SHOTS [facility]        Today's Diagnoses     Allergic rhinitis    -  1       Follow-ups after your visit        Your next 10 appointments already scheduled     Feb 22, 2018  7:50 AM CST   Nurse Only with FZ ALLERGY SHOTS   [facility] ([facility])    [address]  [place] [address]   [phone]            Mar 06, 2018  3:40 PM CST   Nurse Only with FZ ALLERGY SHOTS   [facility] ([facility])    [address]  [place] [address]   [phone]            Mar 13, 2018 12:30 PM CDT   Nurse Only with FZ ALLERGY SHOTS   [facility] ([facility])    [address]  [place] [address]   [phone]            Mar 20, 2018  3:20 PM CDT   Nurse Only with FZ ALLERGY SHOTS   [facility] ([facility])    [address]  [place] [address]   [phone]            Mar 27, 2018 12:40 PM CDT   Nurse Only with FZ ALLERGY SHOTS   [facility] ([facility])    [address]  [place] [address]   [phone]              Who to contact     If you have questions or need follow up information about today's clinic visit or your schedule please contact [facility] directly at [phone].  Normal or non-critical lab and imaging results will be communicated to you by MyChart, letter or phone within 4 business days after the clinic has received the results. If you do not hear from us within 7 days, please contact the clinic through MyChart or phone. If you have a critical or abnormal lab result, we will notify you by phone as soon as possible.  Submit refill requests through [employer] or call  "your pharmacy and they will forward the refill request to us. Please allow 3 business days for your refill to be completed.          Additional Information About Your Visit        MyChart Information     [employer] lets you send messages to your doctor, view your test results, renew your prescriptions, schedule appointments and more. To sign up, go to [email or web address] . Click on \"Log in\" on the left side of the screen, which will take you to the Welcome page. Then click on \"Sign up Now\" on the right side of the page.     You will be asked to enter the access code listed below, as well as some personal information. Please follow the directions to create your username and password.     Your access code is: [email or web address]  Expires: 3/27/2018 12:38 PM     Your access code will  in 90 days. If you need help or a new code, please call your [place] clinic or [phone].        Care EveryWhere ID     This is your Care EveryWhere ID. This could be used by other organizations to access your [place] medical records  [id number]         Blood Pressure from Last 3 Encounters:   18 125/81   17 109/70   06/15/17 104/71    Weight from Last 3 Encounters:   18 64 kg (141 lb)   17 64.7 kg (142 lb 9.6 oz)   06/15/17 62.8 kg (138 lb 6.4 oz)              We Performed the Following     Allergy Shot: Two or more injections        Primary Care Provider Office Phone # Fax #    Paul Weber PA-C [phone]       [address] [place] [address]        Equal Access to Services     [facility]: Hadii madai ruiz Sokeisha, waaxda luqadaha, qaybta kaalmada mando, rema negron . So [facility] [phone].    ATENCIÓN: Si habla español, tiene a arteaga disposición servicios gratuitos de asistencia lingüística. Llame al [phone].    We comply with applicable federal civil rights laws and [place] laws. We do not discriminate on the basis of race, color, " national origin, age, disability, sex, sexual orientation, or gender identity.            Thank you!     Thank you for choosing [facility]  for your care. Our goal is always to provide you with excellent care. Hearing back from our patients is one way we can continue to improve our services. Please take a few minutes to complete the written survey that you may receive in the mail after your visit with us. Thank you!             Your Updated Medication List - Protect others around you: Learn how to safely use, store and throw away your medicines at www.disposemymeds.org.          This list is accurate as of 2/19/18 10:52 AM.  Always use your most recent med list.                   Brand Name Dispense Instructions for use Diagnosis    * ALLERGEN IMMUNOTHERAPY PRESCRIPTION     5 mL    Name of Mix: Mix #1  Mold Alternaria Tenuis GLY 1:10 w/v, HS  0.5 ml Hormodendrum Cladosporioides 1:10 w/v, HS 0.5 ml Diluent: HSA qs to 5ml    Chronic seasonal allergic rhinitis due to pollen, Allergic rhinitis due to mold       * ALLERGEN IMMUNOTHERAPY PRESCRIPTION     5 mL    Name of Mix: Mix #2  Tree  Julio Cesar, White  GLY 1:20 w/v, HS  0.5 ml Birch Mix GLY 1:20 w/v, HS  0.5 ml Boxelder-Maple  Mix BHR (Boxelder Hard Red) 1:20 w/v, HS  0.5 ml [place], Common GLY 1:20 w/v, HS  0.5 ml Elm, American GLY 1:20 w/v, HS  0.5 ml Hackberry GLY 1:20 w/v, HS 0.5 ml Hickory, Shagbark GLY 1:20 w/v, HS  0.5 ml [place] Mix GLY 1:20 w/v, HS 0.5 ml [place], Black GLY 1:20 w/v, HS 0.5 ml Diluent: HSA qs to 5ml    Chronic seasonal allergic rhinitis due to pollen, Allergic rhinitis due to mold       * ALLERGEN IMMUNOTHERAPY PRESCRIPTION     5 mL    Name of Mix: Mix #3  Weeds Nettle GLY 1:20 w/v, HS 0.5 ml Pigweed, Careless GLY 1:20 w/v, HS 0.5 ml Plantain, English GLY 1:20 w/v, HS 0.5 ml Diluent: HSA qs to 5ml    Chronic seasonal allergic rhinitis due to pollen, Allergic rhinitis due to mold       EPINEPHrine 0.3 MG/0.3ML injection 2-pack     AUVI-Q    2 mL    Inject 0.3 mLs (0.3 mg) into the muscle as needed for anaphylaxis    Need for desensitization to allergens       fluticasone 50 MCG/ACT spray    FLONASE    3 Bottle    Spray 1-2 sprays into both nostrils daily    Seasonal allergic rhinitis, unspecified allergic rhinitis trigger       omeprazole 20 MG CR capsule    priLOSEC    90 capsule    Take 1 capsule (20 mg) by mouth daily    Abdominal pain, epigastric       * Notice:  This list has 3 medication(s) that are the same as other medications prescribed for you. Read the directions carefully, and ask your doctor or other care provider to review them with you.

## 2018-02-22 ENCOUNTER — ALLIED HEALTH/NURSE VISIT (OUTPATIENT)
Dept: ALLERGY | Facility: CLINIC | Age: 38
End: 2018-02-22
Payer: COMMERCIAL

## 2018-02-22 DIAGNOSIS — J30.9 ALLERGIC RHINITIS: Primary | ICD-10-CM

## 2018-02-22 PROCEDURE — 99207 ZZC DROP WITH A PROCEDURE: CPT

## 2018-02-22 PROCEDURE — 95117 IMMUNOTHERAPY INJECTIONS: CPT

## 2018-02-22 NOTE — MR AVS SNAPSHOT
After Visit Summary   2/22/2018    Gogo Bourgeois    MRN: [id number]           Patient Information     Date Of Birth          1980        Visit Information        Provider Department      2/22/2018 7:50 AM FZ ALLERGY SHOTS [facility]        Today's Diagnoses     Allergic rhinitis    -  1       Follow-ups after your visit        Your next 10 appointments already scheduled     Mar 06, 2018  3:40 PM CST   Nurse Only with FZ ALLERGY SHOTS   [facility] ([facility])    [address]  [place] [address]   [phone]            Mar 13, 2018 12:30 PM CDT   Nurse Only with FZ ALLERGY SHOTS   [facility] ([facility])    [address]  [place] [address]   [phone]            Mar 20, 2018  3:20 PM CDT   Nurse Only with FZ ALLERGY SHOTS   [facility] ([facility])    [address]  [place] [address]   [phone]            Mar 27, 2018 12:40 PM CDT   Nurse Only with FZ ALLERGY SHOTS   [facility] ([facility])    [address]  [place] [address]   [phone]              Who to contact     If you have questions or need follow up information about today's clinic visit or your schedule please contact [facility] directly at [phone].  Normal or non-critical lab and imaging results will be communicated to you by MyChart, letter or phone within 4 business days after the clinic has received the results. If you do not hear from us within 7 days, please contact the clinic through HeartThishart or phone. If you have a critical or abnormal lab result, we will notify you by phone as soon as possible.  Submit refill requests through iSnap or call your pharmacy and they will forward the refill request to us. Please allow 3 business days for your refill to be completed.          Additional Information About Your Visit        [facility]  "Information     FanFueled lets you send messages to your doctor, view your test results, renew your prescriptions, schedule appointments and more. To sign up, go to [email or web address] . Click on \"Log in\" on the left side of the screen, which will take you to the Welcome page. Then click on \"Sign up Now\" on the right side of the page.     You will be asked to enter the access code listed below, as well as some personal information. Please follow the directions to create your username and password.     Your access code is: [email or web address]  Expires: 3/27/2018 12:38 PM     Your access code will  in 90 days. If you need help or a new code, please call your [facility] clinic or [phone].        Care EveryWhere ID     This is your Care EveryWhere ID. This could be used by other organizations to access your [facility] medical records  [id number]         Blood Pressure from Last 3 Encounters:   18 125/81   17 109/70   06/15/17 104/71    Weight from Last 3 Encounters:   18 64 kg (141 lb)   17 64.7 kg (142 lb 9.6 oz)   06/15/17 62.8 kg (138 lb 6.4 oz)              We Performed the Following     Allergy Shot: Two or more injections        Primary Care Provider Office Phone # Fax #    Paul Weber PA-C [phone]       [address]  [place] [address]        Equal Access to Services     [facility]: Hadii aad ku hadasho Soomaali, waaxda luqadaha, qaybta kaalmada adeegyada, waxay mora negron . So [facility] [phone].    ATENCIÓN: Si habla español, tiene a arteaga disposición servicios gratuitos de asistencia lingüística. Llame al [phone].    We comply with applicable federal civil rights laws and [place] laws. We do not discriminate on the basis of race, color, national origin, age, disability, sex, sexual orientation, or gender identity.            Thank you!     Thank you for choosing [facility]  for your care. Our goal is always to " provide you with excellent care. Hearing back from our patients is one way we can continue to improve our services. Please take a few minutes to complete the written survey that you may receive in the mail after your visit with us. Thank you!             Your Updated Medication List - Protect others around you: Learn how to safely use, store and throw away your medicines at www.disposemymeds.org.          This list is accurate as of 2/22/18  8:43 AM.  Always use your most recent med list.                   Brand Name Dispense Instructions for use Diagnosis    * ALLERGEN IMMUNOTHERAPY PRESCRIPTION     5 mL    Name of Mix: Mix #1  Mold Alternaria Tenuis GLY 1:10 w/v, HS  0.5 ml Hormodendrum Cladosporioides 1:10 w/v, HS 0.5 ml Diluent: HSA qs to 5ml    Chronic seasonal allergic rhinitis due to pollen, Allergic rhinitis due to mold       * ALLERGEN IMMUNOTHERAPY PRESCRIPTION     5 mL    Name of Mix: Mix #2  Tree  Julio Cesar, White  GLY 1:20 w/v, HS  0.5 ml Birch Mix GLY 1:20 w/v, HS  0.5 ml Boxelder-Maple  Mix BHR (Boxelder Hard Red) 1:20 w/v, HS  0.5 ml [place], Common GLY 1:20 w/v, HS  0.5 ml Elm, American GLY 1:20 w/v, HS  0.5 ml Hackberry GLY 1:20 w/v, HS 0.5 ml Hickory, Shagbark GLY 1:20 w/v, HS  0.5 ml [place] Mix GLY 1:20 w/v, HS 0.5 ml [place], Black GLY 1:20 w/v, HS 0.5 ml Diluent: HSA qs to 5ml    Chronic seasonal allergic rhinitis due to pollen, Allergic rhinitis due to mold       * ALLERGEN IMMUNOTHERAPY PRESCRIPTION     5 mL    Name of Mix: Mix #3  Weeds Nettle GLY 1:20 w/v, HS 0.5 ml Pigweed, Careless GLY 1:20 w/v, HS 0.5 ml Plantain, English GLY 1:20 w/v, HS 0.5 ml Diluent: HSA qs to 5ml    Chronic seasonal allergic rhinitis due to pollen, Allergic rhinitis due to mold       EPINEPHrine 0.3 MG/0.3ML injection 2-pack    AUVI-Q    2 mL    Inject 0.3 mLs (0.3 mg) into the muscle as needed for anaphylaxis    Need for desensitization to allergens       fluticasone 50 MCG/ACT spray    FLONASE    3 Bottle    Spray  1-2 sprays into both nostrils daily    Seasonal allergic rhinitis, unspecified allergic rhinitis trigger       omeprazole 20 MG CR capsule    priLOSEC    90 capsule    Take 1 capsule (20 mg) by mouth daily    Abdominal pain, epigastric       * Notice:  This list has 3 medication(s) that are the same as other medications prescribed for you. Read the directions carefully, and ask your doctor or other care provider to review them with you.

## 2018-03-06 ENCOUNTER — ALLIED HEALTH/NURSE VISIT (OUTPATIENT)
Dept: ALLERGY | Facility: CLINIC | Age: 38
End: 2018-03-06
Payer: COMMERCIAL

## 2018-03-06 DIAGNOSIS — J30.9 ALLERGIC RHINITIS: Primary | ICD-10-CM

## 2018-03-06 PROCEDURE — 99207 ZZC DROP WITH A PROCEDURE: CPT

## 2018-03-06 PROCEDURE — 95117 IMMUNOTHERAPY INJECTIONS: CPT

## 2018-03-06 NOTE — MR AVS SNAPSHOT
After Visit Summary   3/6/2018    Gogo Bourgeois    MRN: [id number]           Patient Information     Date Of Birth          1980        Visit Information        Provider Department      3/6/2018 3:40 PM FZ ALLERGY SHOTS [facility]        Today's Diagnoses     Allergic rhinitis    -  1       Follow-ups after your visit        Your next 10 appointments already scheduled     Mar 06, 2018  3:40 PM CST   Nurse Only with FZ ALLERGY SHOTS   [facility] ([facility])    [address]  [place] [address]   [phone]            Mar 13, 2018 12:30 PM CDT   Nurse Only with FZ ALLERGY SHOTS   [facility] ([facility])    [address]  [place] [address]   [phone]            Mar 20, 2018  3:20 PM CDT   Nurse Only with FZ ALLERGY SHOTS   [facility] ([facility])    [address]  [place] [address]   [phone]            Mar 27, 2018 12:40 PM CDT   Nurse Only with FZ ALLERGY SHOTS   [facility] ([facility])    [address]  [place] [address]   [phone]              Who to contact     If you have questions or need follow up information about today's clinic visit or your schedule please contact [facility] directly at [phone].  Normal or non-critical lab and imaging results will be communicated to you by MyChart, letter or phone within 4 business days after the clinic has received the results. If you do not hear from us within 7 days, please contact the clinic through Krowderhart or phone. If you have a critical or abnormal lab result, we will notify you by phone as soon as possible.  Submit refill requests through Eachpal or call your pharmacy and they will forward the refill request to us. Please allow 3 business days for your refill to be completed.          Additional Information About Your Visit        [facility]  "Information     [employer] lets you send messages to your doctor, view your test results, renew your prescriptions, schedule appointments and more. To sign up, go to [email or web address] . Click on \"Log in\" on the left side of the screen, which will take you to the Welcome page. Then click on \"Sign up Now\" on the right side of the page.     You will be asked to enter the access code listed below, as well as some personal information. Please follow the directions to create your username and password.     Your access code is: [email or web address]  Expires: 3/27/2018 12:38 PM     Your access code will  in 90 days. If you need help or a new code, please call your [facility] clinic or [phone].        Care EveryWhere ID     This is your Care EveryWhere ID. This could be used by other organizations to access your [facility] medical records  [id number]         Blood Pressure from Last 3 Encounters:   18 125/81   17 109/70   06/15/17 104/71    Weight from Last 3 Encounters:   18 64 kg (141 lb)   17 64.7 kg (142 lb 9.6 oz)   06/15/17 62.8 kg (138 lb 6.4 oz)              We Performed the Following     Allergy Shot: Two or more injections        Primary Care Provider Office Phone # Fax #    Paul Weber PA-C [phone]       [address]  [place] [address]        Equal Access to Services     [facility]: Hadii aad ku hadasho Soomaali, waaxda luqadaha, qaybta kaalmada adeegyada, waxay mora negron . So [facility] [phone].    ATENCIÓN: Si habla español, tiene a arteaga disposición servicios gratuitos de asistencia lingüística. Llame al [phone].    We comply with applicable federal civil rights laws and [place] laws. We do not discriminate on the basis of race, color, national origin, age, disability, sex, sexual orientation, or gender identity.            Thank you!     Thank you for choosing [facility]  for your care. Our goal is always to " provide you with excellent care. Hearing back from our patients is one way we can continue to improve our services. Please take a few minutes to complete the written survey that you may receive in the mail after your visit with us. Thank you!             Your Updated Medication List - Protect others around you: Learn how to safely use, store and throw away your medicines at www.disposemymeds.org.          This list is accurate as of 3/6/18  3:28 PM.  Always use your most recent med list.                   Brand Name Dispense Instructions for use Diagnosis    * ALLERGEN IMMUNOTHERAPY PRESCRIPTION     5 mL    Name of Mix: Mix #1  Mold Alternaria Tenuis GLY 1:10 w/v, HS  0.5 ml Hormodendrum Cladosporioides 1:10 w/v, HS 0.5 ml Diluent: HSA qs to 5ml    Chronic seasonal allergic rhinitis due to pollen, Allergic rhinitis due to mold       * ALLERGEN IMMUNOTHERAPY PRESCRIPTION     5 mL    Name of Mix: Mix #2  Tree  Julio Cesar, White  GLY 1:20 w/v, HS  0.5 ml Birch Mix GLY 1:20 w/v, HS  0.5 ml Boxelder-Maple  Mix BHR (Boxelder Hard Red) 1:20 w/v, HS  0.5 ml [place], Common GLY 1:20 w/v, HS  0.5 ml Elm, American GLY 1:20 w/v, HS  0.5 ml Hackberry GLY 1:20 w/v, HS 0.5 ml Hickory, Shagbark GLY 1:20 w/v, HS  0.5 ml [place] Mix GLY 1:20 w/v, HS 0.5 ml [place], Black GLY 1:20 w/v, HS 0.5 ml Diluent: HSA qs to 5ml    Chronic seasonal allergic rhinitis due to pollen, Allergic rhinitis due to mold       * ALLERGEN IMMUNOTHERAPY PRESCRIPTION     5 mL    Name of Mix: Mix #3  Weeds Nettle GLY 1:20 w/v, HS 0.5 ml Pigweed, Careless GLY 1:20 w/v, HS 0.5 ml Plantain, English GLY 1:20 w/v, HS 0.5 ml Diluent: HSA qs to 5ml    Chronic seasonal allergic rhinitis due to pollen, Allergic rhinitis due to mold       EPINEPHrine 0.3 MG/0.3ML injection 2-pack    AUVI-Q    2 mL    Inject 0.3 mLs (0.3 mg) into the muscle as needed for anaphylaxis    Need for desensitization to allergens       fluticasone 50 MCG/ACT spray    FLONASE    3 Bottle    Spray  1-2 sprays into both nostrils daily    Seasonal allergic rhinitis, unspecified allergic rhinitis trigger       omeprazole 20 MG CR capsule    priLOSEC    90 capsule    Take 1 capsule (20 mg) by mouth daily    Abdominal pain, epigastric       * Notice:  This list has 3 medication(s) that are the same as other medications prescribed for you. Read the directions carefully, and ask your doctor or other care provider to review them with you.

## 2018-03-13 ENCOUNTER — ALLIED HEALTH/NURSE VISIT (OUTPATIENT)
Dept: ALLERGY | Facility: CLINIC | Age: 38
End: 2018-03-13
Payer: COMMERCIAL

## 2018-03-13 DIAGNOSIS — J30.9 ALLERGIC RHINITIS: Primary | ICD-10-CM

## 2018-03-13 PROCEDURE — 95117 IMMUNOTHERAPY INJECTIONS: CPT

## 2018-03-13 PROCEDURE — 99207 ZZC DROP WITH A PROCEDURE: CPT

## 2018-03-13 NOTE — PROGRESS NOTES
Patient presented after waiting 30 minutes with no reaction to allergy injections. Discharged from clinic.  Lori Nguyen RN on 3/13/2018 at 1:01 PM

## 2018-03-13 NOTE — MR AVS SNAPSHOT
"              After Visit Summary   3/13/2018    Gogo Bourgeois    MRN: [id number]           Patient Information     Date Of Birth          1980        Visit Information        Provider Department      3/13/2018 12:30 PM FZ ALLERGY SHOTS [facility]        Today's Diagnoses     Allergic rhinitis    -  1       Follow-ups after your visit        Your next 10 appointments already scheduled     Mar 20, 2018  3:20 PM CDT   Nurse Only with FZ ALLERGY SHOTS   [facility]    [address]  [place] [address] [phone]            Mar 27, 2018 12:40 PM CDT   Nurse Only with FZ ALLERGY SHOTS   [facility]    [address]  [place] [address] [phone]              Who to contact     If you have questions or need follow up information about today's clinic visit or your schedule please contact [facility] directly at [phone].  Normal or non-critical lab and imaging results will be communicated to you by Senscienthart, letter or phone within 4 business days after the clinic has received the results. If you do not hear from us within 7 days, please contact the clinic through Cimetrixt or phone. If you have a critical or abnormal lab result, we will notify you by phone as soon as possible.  Submit refill requests through Wellcore or call your pharmacy and they will forward the refill request to us. Please allow 3 business days for your refill to be completed.          Additional Information About Your Visit        Wellcore Information     Wellcore lets you send messages to your doctor, view your test results, renew your prescriptions, schedule appointments and more. To sign up, go to [email or web address] . Click on \"Log in\" on the left side of the screen, which will take you to the Welcome page. Then click on \"Sign up Now\" on the right side of the page.     You will be asked to enter the " access code listed below, as well as some personal information. Please follow the directions to create your username and password.     Your access code is: [email or web address]  Expires: 3/27/2018  1:38 PM     Your access code will  in 90 days. If you need help or a new code, please call your [facility] clinic or [phone].        Care EveryWhere ID     This is your Care EveryWhere ID. This could be used by other organizations to access your [facility] medical records  [id number]         Blood Pressure from Last 3 Encounters:   18 125/81   17 109/70   06/15/17 104/71    Weight from Last 3 Encounters:   18 64 kg (141 lb)   17 64.7 kg (142 lb 9.6 oz)   06/15/17 62.8 kg (138 lb 6.4 oz)              We Performed the Following     Allergy Shot: Two or more injections        Primary Care Provider Office Phone # Fax #    Paul Weber PA-C [phone]       [address] [place] [address]        Equal Access to Services     [facility]: Hadii aad ku hadasho Soomaali, waaxda luqadaha, qaybta kaalmada adeegyada, waxay idiin hayaan adeeg abel negron . So [facility] [phone].    ATENCIÓN: Si habla español, tiene a arteaga disposición servicios gratuitos de asistencia lingüística. [facility] [phone].    We comply with applicable federal civil rights laws and [place] laws. We do not discriminate on the basis of race, color, national origin, age, disability, sex, sexual orientation, or gender identity.            Thank you!     Thank you for choosing [facility]  for your care. Our goal is always to provide you with excellent care. Hearing back from our patients is one way we can continue to improve our services. Please take a few minutes to complete the written survey that you may receive in the mail after your visit with us. Thank you!             Your Updated Medication List - Protect others around you: Learn how to safely use, store and throw away your medicines  at www.disposemymeds.org.          This list is accurate as of 3/13/18  1:02 PM.  Always use your most recent med list.                   Brand Name Dispense Instructions for use Diagnosis    * ALLERGEN IMMUNOTHERAPY PRESCRIPTION     5 mL    Name of Mix: Mix #1  Mold Alternaria Tenuis GLY 1:10 w/v, HS  0.5 ml Hormodendrum Cladosporioides 1:10 w/v, HS 0.5 ml Diluent: HSA qs to 5ml    Chronic seasonal allergic rhinitis due to pollen, Allergic rhinitis due to mold       * ALLERGEN IMMUNOTHERAPY PRESCRIPTION     5 mL    Name of Mix: Mix #2  Tree  Julio Cesar, White  GLY 1:20 w/v, HS  0.5 ml Birch Mix GLY 1:20 w/v, HS  0.5 ml Boxelder-Maple  Mix BHR (Boxelder Hard Red) 1:20 w/v, HS  0.5 ml [place], Common GLY 1:20 w/v, HS  0.5 ml Elm, American GLY 1:20 w/v, HS  0.5 ml Hackberry GLY 1:20 w/v, HS 0.5 ml Hickory, Shagbark GLY 1:20 w/v, HS  0.5 ml [place] Mix GLY 1:20 w/v, HS 0.5 ml [place], Black GLY 1:20 w/v, HS 0.5 ml Diluent: HSA qs to 5ml    Chronic seasonal allergic rhinitis due to pollen, Allergic rhinitis due to mold       * ALLERGEN IMMUNOTHERAPY PRESCRIPTION     5 mL    Name of Mix: Mix #3  Weeds Nettle GLY 1:20 w/v, HS 0.5 ml Pigweed, Careless GLY 1:20 w/v, HS 0.5 ml Plantain, English GLY 1:20 w/v, HS 0.5 ml Diluent: HSA qs to 5ml    Chronic seasonal allergic rhinitis due to pollen, Allergic rhinitis due to mold       EPINEPHrine 0.3 MG/0.3ML injection 2-pack    AUVI-Q    2 mL    Inject 0.3 mLs (0.3 mg) into the muscle as needed for anaphylaxis    Need for desensitization to allergens       fluticasone 50 MCG/ACT spray    FLONASE    3 Bottle    Spray 1-2 sprays into both nostrils daily    Seasonal allergic rhinitis, unspecified allergic rhinitis trigger       omeprazole 20 MG CR capsule    priLOSEC    90 capsule    Take 1 capsule (20 mg) by mouth daily    Abdominal pain, epigastric       * Notice:  This list has 3 medication(s) that are the same as other medications prescribed for you. Read the directions carefully,  and ask your doctor or other care provider to review them with you.

## 2018-03-20 ENCOUNTER — ALLIED HEALTH/NURSE VISIT (OUTPATIENT)
Dept: ALLERGY | Facility: CLINIC | Age: 38
End: 2018-03-20
Payer: COMMERCIAL

## 2018-03-20 DIAGNOSIS — J30.9 ALLERGIC RHINITIS: Primary | ICD-10-CM

## 2018-03-20 PROCEDURE — 95117 IMMUNOTHERAPY INJECTIONS: CPT

## 2018-03-20 PROCEDURE — 99207 ZZC DROP WITH A PROCEDURE: CPT

## 2018-03-20 NOTE — MR AVS SNAPSHOT
"              After Visit Summary   3/20/2018    Gogo Bourgeois    MRN: [id number]           Patient Information     Date Of Birth          1980        Visit Information        Provider Department      3/20/2018 3:20 PM FZ ALLERGY SHOTS [facility]        Today's Diagnoses     Allergic rhinitis    -  1       Follow-ups after your visit        Your next 10 appointments already scheduled     Mar 27, 2018 12:40 PM CDT   Nurse Only with FZ ALLERGY SHOTS   [facility]    [address]  [place] [address] [phone]              Who to contact     If you have questions or need follow up information about today's clinic visit or your schedule please contact [facility] directly at [phone].  Normal or non-critical lab and imaging results will be communicated to you by MyChart, letter or phone within 4 business days after the clinic has received the results. If you do not hear from us within 7 days, please contact the clinic through MyChart or phone. If you have a critical or abnormal lab result, we will notify you by phone as soon as possible.  Submit refill requests through [employer] or call your pharmacy and they will forward the refill request to us. Please allow 3 business days for your refill to be completed.          Additional Information About Your Visit        MyChart Information     [employer] lets you send messages to your doctor, view your test results, renew your prescriptions, schedule appointments and more. To sign up, go to [email or web address] . Click on \"Log in\" on the left side of the screen, which will take you to the Welcome page. Then click on \"Sign up Now\" on the right side of the page.     You will be asked to enter the access code listed below, as well as some personal information. Please follow the directions to create your username and password.     Your access code is: [email or web address]  Expires: 3/27/2018  1:38 " PM     Your access code will  in 90 days. If you need help or a new code, please call your [place] clinic or [phone].        Care EveryWhere ID     This is your Care EveryWhere ID. This could be used by other organizations to access your [place] medical records  [id number]         Blood Pressure from Last 3 Encounters:   18 125/81   17 109/70   06/15/17 104/71    Weight from Last 3 Encounters:   18 64 kg (141 lb)   17 64.7 kg (142 lb 9.6 oz)   06/15/17 62.8 kg (138 lb 6.4 oz)              We Performed the Following     Allergy Shot: Two or more injections        Primary Care Provider Office Phone # Fax #    Paul Weber PA-C [phone] [address] [place] [address]        Equal Access to Services     [facility]: Hadii aad ku hadasho Soomaali, waaxda luqadaha, qaybta kaalmada adeegyada, waxay idiin hayaan adeeg kharash la'daronn . So [facility] [phone].    ATENCIÓN: Si habla español, tiene a arteaga disposición servicios gratuitos de asistencia lingüística. [facility] [phone].    We comply with applicable federal civil rights laws and [place] laws. We do not discriminate on the basis of race, color, national origin, age, disability, sex, sexual orientation, or gender identity.            Thank you!     Thank you for choosing [facility]  for your care. Our goal is always to provide you with excellent care. Hearing back from our patients is one way we can continue to improve our services. Please take a few minutes to complete the written survey that you may receive in the mail after your visit with us. Thank you!             Your Updated Medication List - Protect others around you: Learn how to safely use, store and throw away your medicines at www.disposemymeds.org.          This list is accurate as of 3/20/18  3:59 PM.  Always use your most recent med list.                   Brand Name Dispense Instructions for use Diagnosis    *  ALLERGEN IMMUNOTHERAPY PRESCRIPTION     5 mL    Name of Mix: Mix #1  Mold Alternaria Tenuis GLY 1:10 w/v, HS  0.5 ml Hormodendrum Cladosporioides 1:10 w/v, HS 0.5 ml Diluent: HSA qs to 5ml    Chronic seasonal allergic rhinitis due to pollen, Allergic rhinitis due to mold       * ALLERGEN IMMUNOTHERAPY PRESCRIPTION     5 mL    Name of Mix: Mix #2  Tree  Julio Cesar, White  GLY 1:20 w/v, HS  0.5 ml Birch Mix GLY 1:20 w/v, HS  0.5 ml Boxelder-Maple  Mix BHR (Boxelder Hard Red) 1:20 w/v, HS  0.5 ml [place], Common GLY 1:20 w/v, HS  0.5 ml Elm, American GLY 1:20 w/v, HS  0.5 ml Hackberry GLY 1:20 w/v, HS 0.5 ml Hickory, Shagbark GLY 1:20 w/v, HS  0.5 ml [place] Mix GLY 1:20 w/v, HS 0.5 ml [place], Black GLY 1:20 w/v, HS 0.5 ml Diluent: HSA qs to 5ml    Chronic seasonal allergic rhinitis due to pollen, Allergic rhinitis due to mold       * ALLERGEN IMMUNOTHERAPY PRESCRIPTION     5 mL    Name of Mix: Mix #3  Weeds Nettle GLY 1:20 w/v, HS 0.5 ml Pigweed, Careless GLY 1:20 w/v, HS 0.5 ml Plantain, English GLY 1:20 w/v, HS 0.5 ml Diluent: HSA qs to 5ml    Chronic seasonal allergic rhinitis due to pollen, Allergic rhinitis due to mold       EPINEPHrine 0.3 MG/0.3ML injection 2-pack    AUVI-Q    2 mL    Inject 0.3 mLs (0.3 mg) into the muscle as needed for anaphylaxis    Need for desensitization to allergens       fluticasone 50 MCG/ACT spray    FLONASE    3 Bottle    Spray 1-2 sprays into both nostrils daily    Seasonal allergic rhinitis, unspecified allergic rhinitis trigger       omeprazole 20 MG CR capsule    priLOSEC    90 capsule    Take 1 capsule (20 mg) by mouth daily    Abdominal pain, epigastric       * Notice:  This list has 3 medication(s) that are the same as other medications prescribed for you. Read the directions carefully, and ask your doctor or other care provider to review them with you.

## 2018-03-20 NOTE — PROGRESS NOTES
Patient presents for allergy injections today, reports that she experienced itching of her feet the night of her last injections. At about 3 in the morning she woke up and took a Zyrtec and this made it resolve. Denies other symptoms- no hives, denies generalized itching, runny nose, itchy throat, difficulty breathing, nausea or lip/tongue/face swelling. She reports that she did not premedicate prior to her last shots the way she was told to- and did not take them the night prior. She admits she did not tell us this at the time of her injections, but wanted to be honest today.     Reviewed when to use the epi-pen- if combination of two or more mild symptoms, or one severe symptom. Reviewed that she must premedicate prior to her shots- or she cannot receive her injections. Patient verbalized understanding to this. Patient did premedicate by taking her two tablets of Zyrtec last night and this morning, she will plan to take an additional two this evening.   As this was one mild symptom, patient took Zyrtec and it resolved, and patient premedicated per provider order proceeded per protocol today.     Patient presented after waiting 30 minutes with no reaction to allergy injections. Discharged from clinic.  Lori Nguyen RN on 3/20/2018 at 3:56 PM

## 2018-03-27 ENCOUNTER — ALLIED HEALTH/NURSE VISIT (OUTPATIENT)
Dept: ALLERGY | Facility: CLINIC | Age: 38
End: 2018-03-27
Payer: COMMERCIAL

## 2018-03-27 ENCOUNTER — NURSE TRIAGE (OUTPATIENT)
Dept: NURSING | Facility: CLINIC | Age: 38
End: 2018-03-27

## 2018-03-27 DIAGNOSIS — J30.9 ALLERGIC RHINITIS: Primary | ICD-10-CM

## 2018-03-27 PROCEDURE — 99207 ZZC DROP WITH A PROCEDURE: CPT

## 2018-03-27 PROCEDURE — 95117 IMMUNOTHERAPY INJECTIONS: CPT

## 2018-03-27 NOTE — MR AVS SNAPSHOT
"              After Visit Summary   3/27/2018    Gogo Bourgeois    MRN: [id number]           Patient Information     Date Of Birth          1980        Visit Information        Provider Department      3/27/2018 12:40 PM FZ ALLERGY SHOTS [facility] [place]        Today's Diagnoses     Allergic rhinitis    -  1       Follow-ups after your visit        Who to contact     If you have questions or need follow up information about today's clinic visit or your schedule please contact [facility] directly at [phone].  Normal or non-critical lab and imaging results will be communicated to you by MyChart, letter or phone within 4 business days after the clinic has received the results. If you do not hear from us within 7 days, please contact the clinic through [employer] or phone. If you have a critical or abnormal lab result, we will notify you by phone as soon as possible.  Submit refill requests through [employer] or call your pharmacy and they will forward the refill request to us. Please allow 3 business days for your refill to be completed.          Additional Information About Your Visit        MyChart Information     [employer] lets you send messages to your doctor, view your test results, renew your prescriptions, schedule appointments and more. To sign up, go to [email or web address] . Click on \"Log in\" on the left side of the screen, which will take you to the Welcome page. Then click on \"Sign up Now\" on the right side of the page.     You will be asked to enter the access code listed below, as well as some personal information. Please follow the directions to create your username and password.     Your access code is: [email or web address]  Expires: 2018  1:51 PM     Your access code will  in 90 days. If you need help or a new code, please call your [facility] or [phone].        Care EveryWhere ID     This is your Care EveryWhere ID. This could be used by other organizations to " access your [place] medical records  [id number]         Blood Pressure from Last 3 Encounters:   01/02/18 125/81   11/20/17 109/70   06/15/17 104/71    Weight from Last 3 Encounters:   01/02/18 64 kg (141 lb)   11/20/17 64.7 kg (142 lb 9.6 oz)   06/15/17 62.8 kg (138 lb 6.4 oz)              We Performed the Following     Allergy Shot: Two or more injections        Primary Care Provider Office Phone # Fax #    Paul Weber PA-C [phone] [address]  [place] [address]        Equal Access to Services     MEGHAN CARR : Hadii aad ku hadasho Soomaali, waaxda luqadaha, qaybta kaalmada adeegyada, waxqiana velazco haydaronn micaela negron . So [facility] [phone].    ATENCIÓN: Si habla español, tiene a arteaga disposición servicios gratuitos de asistencia lingüística. Llame al [phone].    We comply with applicable federal civil rights laws and [place] laws. We do not discriminate on the basis of race, color, national origin, age, disability, sex, sexual orientation, or gender identity.            Thank you!     Thank you for choosing [facility]  for your care. Our goal is always to provide you with excellent care. Hearing back from our patients is one way we can continue to improve our services. Please take a few minutes to complete the written survey that you may receive in the mail after your visit with us. Thank you!             Your Updated Medication List - Protect others around you: Learn how to safely use, store and throw away your medicines at www.disposemymeds.org.          This list is accurate as of 3/27/18  1:51 PM.  Always use your most recent med list.                   Brand Name Dispense Instructions for use Diagnosis    * ALLERGEN IMMUNOTHERAPY PRESCRIPTION     5 mL    Name of Mix: Mix #1  Mold Alternaria Tenuis GLY 1:10 w/v, HS  0.5 ml Hormodendrum Cladosporioides 1:10 w/v, HS 0.5 ml Diluent: HSA qs to 5ml    Chronic seasonal allergic rhinitis due to  pollen, Allergic rhinitis due to mold       * ALLERGEN IMMUNOTHERAPY PRESCRIPTION     5 mL    Name of Mix: Mix #2  Tree  Julio Cesar, White  GLY 1:20 w/v, HS  0.5 ml Birch Mix GLY 1:20 w/v, HS  0.5 ml Boxelder-Maple  Mix BHR (Boxelder Hard Red) 1:20 w/v, HS  0.5 ml [place], Common GLY 1:20 w/v, HS  0.5 ml Elm, American GLY 1:20 w/v, HS  0.5 ml Hackberry GLY 1:20 w/v, HS 0.5 ml Hickory, Shagbark GLY 1:20 w/v, HS  0.5 ml [place] Mix GLY 1:20 w/v, HS 0.5 ml [place], Black GLY 1:20 w/v, HS 0.5 ml Diluent: HSA qs to 5ml    Chronic seasonal allergic rhinitis due to pollen, Allergic rhinitis due to mold       * ALLERGEN IMMUNOTHERAPY PRESCRIPTION     5 mL    Name of Mix: Mix #3  Weeds Nettle GLY 1:20 w/v, HS 0.5 ml Pigweed, Careless GLY 1:20 w/v, HS 0.5 ml Plantain, English GLY 1:20 w/v, HS 0.5 ml Diluent: HSA qs to 5ml    Chronic seasonal allergic rhinitis due to pollen, Allergic rhinitis due to mold       EPINEPHrine 0.3 MG/0.3ML injection 2-pack    AUVI-Q    2 mL    Inject 0.3 mLs (0.3 mg) into the muscle as needed for anaphylaxis    Need for desensitization to allergens       fluticasone 50 MCG/ACT spray    FLONASE    3 Bottle    Spray 1-2 sprays into both nostrils daily    Seasonal allergic rhinitis, unspecified allergic rhinitis trigger       omeprazole 20 MG CR capsule    priLOSEC    90 capsule    Take 1 capsule (20 mg) by mouth daily    Abdominal pain, epigastric       * Notice:  This list has 3 medication(s) that are the same as other medications prescribed for you. Read the directions carefully, and ask your doctor or other care provider to review them with you.

## 2018-03-27 NOTE — TELEPHONE ENCOUNTER
"  Additional Information    Negative: Severe difficulty breathing (e.g., struggling for each breath, speaks in single words)    Negative: Difficult to awaken or acting confused (e.g., disoriented, slurred speech)    Negative: Shock suspected (e.g., cold/pale/clammy skin, too weak to stand, low BP, rapid pulse)    Negative: [1] Chest pain lasts > 5 minutes AND [2] history of heart disease  (i.e., heart attack, bypass surgery, angina, angioplasty, CHF; not just a heart murmur)    Negative: [1] Chest pain lasts > 5 minutes AND [2] described as crushing, pressure-like, or heavy    Negative: [1] Chest pain lasts > 5 minutes AND [2] age > 50    Negative: [1] Chest pain lasts > 5 minutes AND [2] age > 30 AND [3] at least one cardiac risk factor (i.e., hypertension, diabetes, obesity, smoker or strong family history of heart disease)    Negative: [1] Chest pain lasts > 5 minutes AND [2] not relieved with nitroglycerin    Negative: Passed out (i.e., lost consciousness, collapsed and was not responding)    Negative: Heart beating < 50 beats per minute OR > 140 beats per minute    Negative: Visible sweat on face or sweat dripping down face    Negative: Sounds like a life-threatening emergency to the triager    Negative: Followed a chest injury    Negative: SEVERE chest pain     Pain at 4.    Negative: [1] Intermittent  chest pain or \"angina\" AND [2] increasing in severity or frequency  (Exception: pains lasting a few seconds)    Negative: Pain also present in shoulder(s) or arm(s) or jaw  (Exception: pain is clearly made worse by movement)    Negative: Difficulty breathing    Negative: Dizziness or lightheadedness    Negative: Coughing up blood    Negative: Cocaine use within last 3 days    Negative: History of prior \"blood clot\" in leg or lungs (i.e., deep vein thrombosis, pulmonary embolism)    Negative: Recent illness requiring prolonged bedrest (i.e., immobilization)    Negative: Hip or leg fracture in past 2 months (e.g., " "had cast on leg or ankle)    Negative: Major surgery in the past month    Negative: Recent long-distance travel with prolonged time in car, bus, plane, or train (i.e., within past 2 weeks; 6 or  more hours duration)    Negative: Chest pain lasts > 5 minutes (Exceptions: chest pain occurring > 3 days ago and now asymptomatic; same as previously diagnosed heartburn and has accompanying sour taste in mouth)    Negative: Taking a deep breath makes pain worse    Negative: Patient sounds very sick or weak to the triager    Negative: [1] Chest pain lasts > 5 minutes AND [2] occurred > 3 days ago (72 hours) AND [3] NO chest pain or cardiac symptoms now    Negative: [1] Chest pain lasting <= 5 minutes AND [2] NO chest pain or cardiac symptoms now(Exceptions: pains lasting a few seconds)    Negative: Fever > 100.5 F (38.1 C)    Negative: Rash in same area as pain (may be described as \"small blisters\")    Negative: [1] Patient claims chest pain is same as previously diagnosed \"heartburn\" AND [2] describes burning in chest AND [3] accompanying sour taste in mouth    Negative: [1] Chest pain lasting <= 5 minutes AND [2] has not taken prescribed nitroglycerin    Negative: [1] Chest pain lasting <= 5 minutes AND [2] completely relieved by nitroglycerin    Negative: [1] Intermittent chest pain from \"angina\" AND [2] NO increase in severity or frequency    Negative: Chest pain(s) lasting a few seconds from coughing AND [2] persists > 3 days    Negative: [1] Chest pain(s) lasting a few seconds AND [2] persists > 3 days    Negative: Chest pain(s) lasting a few seconds from coughing (all triage questions negative)    Chest pain(s) lasting a few seconds (all triage questions negative)    Protocols used: CHEST PAIN-ADULT-AH    She will try Tylenol or ibuprofen and will call back with changes.  She Koehler RN-[facility]    "

## 2018-04-10 ENCOUNTER — ALLIED HEALTH/NURSE VISIT (OUTPATIENT)
Dept: ALLERGY | Facility: CLINIC | Age: 38
End: 2018-04-10
Payer: COMMERCIAL

## 2018-04-10 DIAGNOSIS — J30.9 ALLERGIC RHINITIS: Primary | ICD-10-CM

## 2018-04-10 PROCEDURE — 99207 ZZC DROP WITH A PROCEDURE: CPT

## 2018-04-10 PROCEDURE — 95117 IMMUNOTHERAPY INJECTIONS: CPT

## 2018-04-10 NOTE — MR AVS SNAPSHOT
After Visit Summary   4/10/2018    Gogo Bourgeois    MRN: [id number]           Patient Information     Date Of Birth          1980        Visit Information        Provider Department      4/10/2018 12:40 PM FZ ALLERGY SHOTS [facility] [place]        Today's Diagnoses     Allergic rhinitis    -  1       Follow-ups after your visit        Your next 10 appointments already scheduled     Apr 24, 2018 12:30 PM CDT   Nurse Only with FZ ALLERGY SHOTS   [facility] [place] ([facility] [place])    [address]  [place] [address]   [phone]            May 01, 2018  3:20 PM CDT   Nurse Only with FZ ALLERGY SHOTS   [facility] [place] ([facility] [place])    [address]  [place] [address]   [phone]            May 08, 2018 12:30 PM CDT   Nurse Only with FZ ALLERGY SHOTS   [facility] [place] ([facility] [place])    [address]  [place] [address]   [phone]            May 15, 2018  3:20 PM CDT   Nurse Only with FZ ALLERGY SHOTS   [facility] [place] ([facility] [place])    [address]  [place] [address]   [phone]            May 22, 2018 12:40 PM CDT   Nurse Only with FZ ALLERGY SHOTS   [facility] [place] ([facility] [place])    [address]  [place] [address]   [phone]            May 29, 2018  3:20 PM CDT   Nurse Only with FZ ALLERGY SHOTS   [facility] [place] ([facility] [place])    [address]  [place] [address]   [phone]              Who to contact     If you have questions or need follow up information about today's clinic visit or your schedule please contact [facility] directly at [phone].  Normal or non-critical lab and imaging results will be communicated to you by MyChart, letter or phone within 4 business days after the clinic has received the results. If you do not hear from us within 7 days, please  "contact the clinic through [employer] or phone. If you have a critical or abnormal lab result, we will notify you by phone as soon as possible.  Submit refill requests through [employer] or call your pharmacy and they will forward the refill request to us. Please allow 3 business days for your refill to be completed.          Additional Information About Your Visit        [employer] Information     [employer] lets you send messages to your doctor, view your test results, renew your prescriptions, schedule appointments and more. To sign up, go to [email or web address] . Click on \"Log in\" on the left side of the screen, which will take you to the Welcome page. Then click on \"Sign up Now\" on the right side of the page.     You will be asked to enter the access code listed below, as well as some personal information. Please follow the directions to create your username and password.     Your access code is: [email or web address]  Expires: 2018  1:51 PM     Your access code will  in 90 days. If you need help or a new code, please call your [place] clinic or [phone].        Care EveryWhere ID     This is your Care EveryWhere ID. This could be used by other organizations to access your [place] medical records  [id number]         Blood Pressure from Last 3 Encounters:   18 125/81   17 109/70   06/15/17 104/71    Weight from Last 3 Encounters:   18 64 kg (141 lb)   17 64.7 kg (142 lb 9.6 oz)   06/15/17 62.8 kg (138 lb 6.4 oz)              We Performed the Following     Allergy Shot: Two or more injections        Primary Care Provider Office Phone # Fax #    Paul Weber PA-C [phone]       [address] [place] [address]        Equal Access to Services     ZENAIDA CARR : Sandra Solis, waradha luqadaha, qaybta kaalmarema wynne. [facility] [phone].    ATENCIÓN: Si gene britton, tiene a arteaga disposición servicios " jose de asistencia lingüística. Luciana stratton [phone].    We comply with applicable federal civil rights laws and [place] laws. We do not discriminate on the basis of race, color, national origin, age, disability, sex, sexual orientation, or gender identity.            Thank you!     Thank you for [facility]  for your [facility]. Our goal is always to provide you with excellent care. Hearing back from our patients is one way we can continue to improve our services. Please take a few minutes to complete the written survey that you may receive in the mail after your visit with us. Thank you!             Your Updated Medication List - Protect others around you: Learn how to safely use, store and throw away your medicines at www.disposemymeds.org.          This list is accurate as of 4/10/18  1:22 PM.  Always use your most recent med list.                   Brand Name Dispense Instructions for use Diagnosis    * ALLERGEN IMMUNOTHERAPY PRESCRIPTION     5 mL    Name of Mix: Mix #1  Mold Alternaria Tenuis GLY 1:10 w/v, HS  0.5 ml Hormodendrum Cladosporioides 1:10 w/v, HS 0.5 ml Diluent: HSA qs to 5ml    Chronic seasonal allergic rhinitis due to pollen, Allergic rhinitis due to mold       * ALLERGEN IMMUNOTHERAPY PRESCRIPTION     5 mL    Name of Mix: Mix #2  Tree  Julio Cesar, White  GLY 1:20 w/v, HS  0.5 ml Birch Mix GLY 1:20 w/v, HS  0.5 ml Boxelder-Maple  Mix BHR (Boxelder Hard Red) 1:20 w/v, HS  0.5 ml [place], Common GLY 1:20 w/v, HS  0.5 ml Elm, American GLY 1:20 w/v, HS  0.5 ml Hackberry GLY 1:20 w/v, HS 0.5 ml Hickory, Shagbark GLY 1:20 w/v, HS  0.5 ml [place] Mix GLY 1:20 w/v, HS 0.5 ml [place], Black GLY 1:20 w/v, HS 0.5 ml Diluent: HSA qs to 5ml    Chronic seasonal allergic rhinitis due to pollen, Allergic rhinitis due to mold       * ALLERGEN IMMUNOTHERAPY PRESCRIPTION     5 mL    Name of Mix: Mix #3  Weeds Nettle GLY 1:20 w/v, HS 0.5 ml Pigweed, Careless GLY 1:20 w/v, HS 0.5 ml Plantain,  English GLY 1:20 w/v, HS 0.5 ml Diluent: HSA qs to 5ml    Chronic seasonal allergic rhinitis due to pollen, Allergic rhinitis due to mold       EPINEPHrine 0.3 MG/0.3ML injection 2-pack    AUVI-Q    2 mL    Inject 0.3 mLs (0.3 mg) into the muscle as needed for anaphylaxis    Need for desensitization to allergens       fluticasone 50 MCG/ACT spray    FLONASE    3 Bottle    Spray 1-2 sprays into both nostrils daily    Seasonal allergic rhinitis, unspecified allergic rhinitis trigger       omeprazole 20 MG CR capsule    priLOSEC    90 capsule    Take 1 capsule (20 mg) by mouth daily    Abdominal pain, epigastric       * Notice:  This list has 3 medication(s) that are the same as other medications prescribed for you. Read the directions carefully, and ask your doctor or other care provider to review them with you.

## 2018-04-24 ENCOUNTER — ALLIED HEALTH/NURSE VISIT (OUTPATIENT)
Dept: ALLERGY | Facility: CLINIC | Age: 38
End: 2018-04-24
Payer: COMMERCIAL

## 2018-04-24 DIAGNOSIS — J30.9 ALLERGIC RHINITIS: Primary | ICD-10-CM

## 2018-04-24 PROCEDURE — 99207 ZZC DROP WITH A PROCEDURE: CPT

## 2018-04-24 PROCEDURE — 95117 IMMUNOTHERAPY INJECTIONS: CPT

## 2018-04-24 NOTE — PROGRESS NOTES
Patient presented after waiting 30 minutes with no reaction to allergy injections. Discharged from clinic.  Lori Nguyen RN on 4/24/2018 at 1:16 PM

## 2018-04-24 NOTE — MR AVS SNAPSHOT
After Visit Summary   4/24/2018    Gogo Bourgeois    MRN: [id number]           Patient Information     Date Of Birth          1980        Visit Information        Provider Department      4/24/2018 12:30 PM FZ ALLERGY SHOTS [facility]        Today's Diagnoses     Allergic rhinitis    -  1       Follow-ups after your visit        Your next 10 appointments already scheduled     May 01, 2018  3:20 PM CDT   Nurse Only with FZ ALLERGY SHOTS   [facility] ([facility])    [address]  [place] [address]   [phone]            May 08, 2018 12:30 PM CDT   Nurse Only with FZ ALLERGY SHOTS   [facility] ([facility])    [address]  [place] [address]   [phone]            May 15, 2018  3:20 PM CDT   Nurse Only with FZ ALLERGY SHOTS   [facility] ([facility])    [address]  [place] [address]   [phone]            May 22, 2018 12:40 PM CDT   Nurse Only with FZ ALLERGY SHOTS   [facility] ([facility])    [address]  [place] [address]   [phone]            May 29, 2018  3:20 PM CDT   Nurse Only with FZ ALLERGY SHOTS   [facility] ([facility])    [address]  [place] [address]   [phone]              Who to contact     If you have questions or need follow up information about today's clinic visit or your schedule please contact [facility] directly at [phone].  Normal or non-critical lab and imaging results will be communicated to you by MyChart, letter or phone within 4 business days after the clinic has received the results. If you do not hear from us within 7 days, please contact the clinic through MyChart or phone. If you have a critical or abnormal lab result, we will notify you by phone as soon as possible.  Submit refill requests through PumpUphart or call  "your pharmacy and they will forward the refill request to us. Please allow 3 business days for your refill to be completed.          Additional Information About Your Visit        MyChart Information     ShowMe lets you send messages to your doctor, view your test results, renew your prescriptions, schedule appointments and more. To sign up, go to [email or web address] . Click on \"Log in\" on the left side of the screen, which will take you to the Welcome page. Then click on \"Sign up Now\" on the right side of the page.     You will be asked to enter the access code listed below, as well as some personal information. Please follow the directions to create your username and password.     Your access code is: [email or web address]  Expires: 2018  1:51 PM     Your access code will  in 90 days. If you need help or a new code, please call your [facility] clinic or [phone].        Care EveryWhere ID     This is your Care EveryWhere ID. This could be used by other organizations to access your [facility] medical records  [id number]         Blood Pressure from Last 3 Encounters:   18 125/81   17 109/70   06/15/17 104/71    Weight from Last 3 Encounters:   18 64 kg (141 lb)   17 64.7 kg (142 lb 9.6 oz)   06/15/17 62.8 kg (138 lb 6.4 oz)              We Performed the Following     Allergy Shot: Two or more injections        Primary Care Provider Office Phone # Fax #    Paul Weber PA-C [phone]       [address] [place] [address]        Equal Access to Services     [facility]: Hadii madai ruiz Sokeisha, waaxda luqadaha, qaybta kaalmada adeyanira, rema negron . So [facility] [phone].    ATENCIÓN: Si habla español, tiene a arteaga disposición servicios gratuitos de asistencia lingüística. Llame al [phone].    We comply with applicable federal civil rights laws and [place] laws. We do not discriminate on the basis of race, color, " national origin, age, disability, sex, sexual orientation, or gender identity.            Thank you!     Thank you for choosing [facility]  for your care. Our goal is always to provide you with excellent care. Hearing back from our patients is one way we can continue to improve our services. Please take a few minutes to complete the written survey that you may receive in the mail after your visit with us. Thank you!             Your Updated Medication List - Protect others around you: Learn how to safely use, store and throw away your medicines at www.disposemymeds.org.          This list is accurate as of 4/24/18  1:16 PM.  Always use your most recent med list.                   Brand Name Dispense Instructions for use Diagnosis    * ALLERGEN IMMUNOTHERAPY PRESCRIPTION     5 mL    Name of Mix: Mix #1  Mold Alternaria Tenuis GLY 1:10 w/v, HS  0.5 ml Hormodendrum Cladosporioides 1:10 w/v, HS 0.5 ml Diluent: HSA qs to 5ml    Chronic seasonal allergic rhinitis due to pollen, Allergic rhinitis due to mold       * ALLERGEN IMMUNOTHERAPY PRESCRIPTION     5 mL    Name of Mix: Mix #2  Tree  Julio Cesar, White  GLY 1:20 w/v, HS  0.5 ml Birch Mix GLY 1:20 w/v, HS  0.5 ml Boxelder-Maple  Mix BHR (Boxelder Hard Red) 1:20 w/v, HS  0.5 ml Tyrone, Common GLY 1:20 w/v, HS  0.5 ml Elm, American GLY 1:20 w/v, HS  0.5 ml Hackberry GLY 1:20 w/v, HS 0.5 ml Hickory, Shagbark GLY 1:20 w/v, HS  0.5 ml [place] Mix GLY 1:20 w/v, HS 0.5 ml [place], Black GLY 1:20 w/v, HS 0.5 ml Diluent: HSA qs to 5ml    Chronic seasonal allergic rhinitis due to pollen, Allergic rhinitis due to mold       * ALLERGEN IMMUNOTHERAPY PRESCRIPTION     5 mL    Name of Mix: Mix #3  Weeds Nettle GLY 1:20 w/v, HS 0.5 ml Pigweed, Careless GLY 1:20 w/v, HS 0.5 ml Plantain, English GLY 1:20 w/v, HS 0.5 ml Diluent: HSA qs to 5ml    Chronic seasonal allergic rhinitis due to pollen, Allergic rhinitis due to mold       EPINEPHrine 0.3 MG/0.3ML injection 2-pack     AUVI-Q    2 mL    Inject 0.3 mLs (0.3 mg) into the muscle as needed for anaphylaxis    Need for desensitization to allergens       fluticasone 50 MCG/ACT spray    FLONASE    3 Bottle    Spray 1-2 sprays into both nostrils daily    Seasonal allergic rhinitis, unspecified allergic rhinitis trigger       omeprazole 20 MG CR capsule    priLOSEC    90 capsule    Take 1 capsule (20 mg) by mouth daily    Abdominal pain, epigastric       * Notice:  This list has 3 medication(s) that are the same as other medications prescribed for you. Read the directions carefully, and ask your doctor or other care provider to review them with you.

## 2018-05-08 ENCOUNTER — ALLIED HEALTH/NURSE VISIT (OUTPATIENT)
Dept: ALLERGY | Facility: CLINIC | Age: 38
End: 2018-05-08
Payer: COMMERCIAL

## 2018-05-08 ENCOUNTER — OFFICE VISIT (OUTPATIENT)
Dept: FAMILY MEDICINE | Facility: CLINIC | Age: 38
End: 2018-05-08
Payer: COMMERCIAL

## 2018-05-08 VITALS
WEIGHT: 143.4 LBS | BODY MASS INDEX: 27.1 KG/M2 | TEMPERATURE: 98.1 F | OXYGEN SATURATION: 99 % | DIASTOLIC BLOOD PRESSURE: 68 MMHG | HEART RATE: 86 BPM | SYSTOLIC BLOOD PRESSURE: 100 MMHG

## 2018-05-08 DIAGNOSIS — R22.9 LOCALIZED SUPERFICIAL SWELLING, MASS, OR LUMP: ICD-10-CM

## 2018-05-08 DIAGNOSIS — J30.9 ALLERGIC RHINITIS: Primary | ICD-10-CM

## 2018-05-08 DIAGNOSIS — R25.3 EYELID TWITCH: Primary | ICD-10-CM

## 2018-05-08 PROCEDURE — 95117 IMMUNOTHERAPY INJECTIONS: CPT

## 2018-05-08 PROCEDURE — 99207 ZZC DROP WITH A PROCEDURE: CPT

## 2018-05-08 PROCEDURE — 99213 OFFICE O/P EST LOW 20 MIN: CPT | Performed by: PHYSICIAN ASSISTANT

## 2018-05-08 NOTE — MR AVS SNAPSHOT
"              After Visit Summary   5/8/2018    Gogo Bourgeois    MRN: [id number]           Patient Information     Date Of Birth          1980        Visit Information        Provider Department      5/8/2018 12:30 PM FZ ALLERGY SHOTS [facility]        Today's Diagnoses     Allergic rhinitis    -  1       Follow-ups after your visit        Your next 10 appointments already scheduled     May 22, 2018 12:40 PM CDT   Nurse Only with FZ ALLERGY SHOTS   [facility] ([facility])    [address]  [place] [address] [phone]            May 29, 2018  3:20 PM CDT   Nurse Only with FZ ALLERGY SHOTS   [facility] ([facility])    [address]  [place] [address] [phone]              Who to contact     If you have questions or need follow up information about today's clinic visit or your schedule please contact [facility] directly at [phone].  Normal or non-critical lab and imaging results will be communicated to you by SAJE Pharmahart, letter or phone within 4 business days after the clinic has received the results. If you do not hear from us within 7 days, please contact the clinic through Mochilat or phone. If you have a critical or abnormal lab result, we will notify you by phone as soon as possible.  Submit refill requests through Wise Connect or call your pharmacy and they will forward the refill request to us. Please allow 3 business days for your refill to be completed.          Additional Information About Your Visit        Wise Connect Information     Wise Connect lets you send messages to your doctor, view your test results, renew your prescriptions, schedule appointments and more. To sign up, go to [email or web address] . Click on \"Log in\" on the left side of the screen, which will take you to the Welcome page. Then click on \"Sign up Now\" on the right side of the page.     You will be asked to enter the access " code listed below, as well as some personal information. Please follow the directions to create your username and password.     Your access code is: [email or web address]  Expires: 2018  1:51 PM     Your access code will  in 90 days. If you need help or a new code, please call your [facility] clinic or [phone].        Care EveryWhere ID     This is your Care EveryWhere ID. This could be used by other organizations to access your [facility] medical records  [id number]         Blood Pressure from Last 3 Encounters:   18 100/68   18 125/81   17 109/70    Weight from Last 3 Encounters:   18 65 kg (143 lb 6.4 oz)   18 64 kg (141 lb)   17 64.7 kg (142 lb 9.6 oz)              We Performed the Following     Allergy Shot: Two or more injections        Primary Care Provider Office Phone # Fax #    Paul Weber PA-C [phone]       [address] [facility] [facility] [address]        Equal Access to Services     [facility]: Hadii aad ku hadasho Soomaali, waaxda luqadaha, qaybta kaalmada adeegyada, waxay idiin hayaan adeeg abel negron . So [facility] [phone].    ATENCIÓN: Si habla español, tiene a arteaga disposición servicios gratuitos de asistencia lingüística. [facility] [phone].    We comply with applicable federal civil rights laws and [place] laws. We do not discriminate on the basis of race, color, national origin, age, disability, sex, sexual orientation, or gender identity.            Thank you!     Thank you for choosing [facility]  for your care. Our goal is always to provide you with excellent care. Hearing back from our patients is one way we can continue to improve our services. Please take a few minutes to complete the written survey that you may receive in the mail after your visit with us. Thank you!             Your Updated Medication List - Protect others around you: Learn how to safely use, store and throw away your medicines at  www.disposemymeds.org.          This list is accurate as of 5/8/18  1:11 PM.  Always use your most recent med list.                   Brand Name Dispense Instructions for use Diagnosis    * ALLERGEN IMMUNOTHERAPY PRESCRIPTION     5 mL    Name of Mix: Mix #1  Mold Alternaria Tenuis GLY 1:10 w/v, HS  0.5 ml Hormodendrum Cladosporioides 1:10 w/v, HS 0.5 ml Diluent: HSA qs to 5ml    Chronic seasonal allergic rhinitis due to pollen, Allergic rhinitis due to mold       * ALLERGEN IMMUNOTHERAPY PRESCRIPTION     5 mL    Name of Mix: Mix #2  Tree  Julio Cesar, White  GLY 1:20 w/v, HS  0.5 ml Birch Mix GLY 1:20 w/v, HS  0.5 ml Boxelder-Maple  Mix BHR (Boxelder Hard Red) 1:20 w/v, HS  0.5 ml [place], Common GLY 1:20 w/v, HS  0.5 ml Elm, American GLY 1:20 w/v, HS  0.5 ml Hackberry GLY 1:20 w/v, HS 0.5 ml Hickory, Shagbark GLY 1:20 w/v, HS  0.5 ml [place] Mix GLY 1:20 w/v, HS 0.5 ml [place], Black GLY 1:20 w/v, HS 0.5 ml Diluent: HSA qs to 5ml    Chronic seasonal allergic rhinitis due to pollen, Allergic rhinitis due to mold       * ALLERGEN IMMUNOTHERAPY PRESCRIPTION     5 mL    Name of Mix: Mix #3  Weeds Nettle GLY 1:20 w/v, HS 0.5 ml Pigweed, Careless GLY 1:20 w/v, HS 0.5 ml Plantain, English GLY 1:20 w/v, HS 0.5 ml Diluent: HSA qs to 5ml    Chronic seasonal allergic rhinitis due to pollen, Allergic rhinitis due to mold       EPINEPHrine 0.3 MG/0.3ML injection 2-pack    AUVI-Q    2 mL    Inject 0.3 mLs (0.3 mg) into the muscle as needed for anaphylaxis    Need for desensitization to allergens       fluticasone 50 MCG/ACT spray    FLONASE    3 Bottle    Spray 1-2 sprays into both nostrils daily    Seasonal allergic rhinitis, unspecified allergic rhinitis trigger       omeprazole 20 MG CR capsule    priLOSEC    90 capsule    Take 1 capsule (20 mg) by mouth daily    Abdominal pain, epigastric       * Notice:  This list has 3 medication(s) that are the same as other medications prescribed for you. Read the directions carefully, and  ask your doctor or other care provider to review them with you.

## 2018-05-08 NOTE — MR AVS SNAPSHOT
After Visit Summary   5/8/2018    Gogo Bourgeois    MRN: [id number]           Patient Information     Date Of Birth          1980        Visit Information        Provider Department      5/8/2018 9:20 AM Miladys Dominguez PA-C [facility]        Today's Diagnoses     Eyelid twitch    -  1    Localized superficial swelling, mass, or lump           Follow-ups after your visit        Your next 10 appointments already scheduled     May 08, 2018 12:30 PM CDT   Nurse Only with FZ ALLERGY SHOTS   [facility] ([facility]    [address] [address]   [phone]            May 15, 2018  3:20 PM CDT   Nurse Only with FZ ALLERGY SHOTS   [facility] ([facility])    [address] [address]   [phone]            May 22, 2018 12:40 PM CDT   Nurse Only with FZ ALLERGY SHOTS   [facility] ([facility])    [address] [address]   [phone]            May 29, 2018  3:20 PM CDT   Nurse Only with FZ ALLERGY SHOTS   [facility] ([facility])    [address] [address]   [phone]              Who to contact     Normal or non-critical lab and imaging results will be communicated to you by MyChart, letter or phone within 4 business days after the clinic has received the results. If you do not hear from us within 7 days, please contact the clinic through MyChart or phone. If you have a critical or abnormal lab result, we will notify you by phone as soon as possible.  Submit refill requests through TweetPhoto or call your pharmacy and they will forward the refill request to us. Please allow 3 business days for your refill to be completed.          If you need to speak with a  for additional information , please call: [phone]             Additional Information About Your Visit       "  MyChart Information     DataCrowd lets you send messages to your doctor, view your test results, renew your prescriptions, schedule appointments and more. To sign up, go to [email or web address] . Click on \"Log in\" on the left side of the screen, which will take you to the Welcome page. Then click on \"Sign up Now\" on the right side of the page.     You will be asked to enter the access code listed below, as well as some personal information. Please follow the directions to create your username and password.     Your access code is: [email or web address]  Expires: 2018  1:51 PM     Your access code will  in 90 days. If you need help or a new code, please call your [facility] clinic or [phone].        Care EveryWhere ID     This is your Care EveryWhere ID. This could be used by other organizations to access your [facility] medical records  [id number]        Your Vitals Were     Pulse Temperature Pulse Oximetry BMI (Body Mass Index)          86 98.1  F (36.7  C) (Tympanic) 99% 27.1 kg/m2         Blood Pressure from Last 3 Encounters:   18 100/68   18 125/81   17 109/70    Weight from Last 3 Encounters:   18 143 lb 6.4 oz (65 kg)   18 141 lb (64 kg)   17 142 lb 9.6 oz (64.7 kg)              Today, you had the following     No orders found for display       Primary Care Provider Office Phone # Fax #    Paul Weber PA-C [phone]       [address]  [place] [address]        Equal Access to Services     [facility] : Hadii madai Solis, waaxda luqadaha, qaybta kaalmada micaelayada, rema umaña. So [facility] [phone].    ATENCIÓN: Si habla español, tiene a arteaga disposición servicios gratuitos de asistencia lingüística. Llame al [phone].    We comply with applicable federal civil rights laws and [place] laws. We do not discriminate on the basis of race, color, national origin, age, disability, sex, sexual " orientation, or gender identity.            Thank you!     Thank you for choosing [facility]  for your care. Our goal is always to provide you with excellent care. Hearing back from our patients is one way we can continue to improve our services. Please take a few minutes to complete the written survey that you may receive in the mail after your visit with us. Thank you!             Your Updated Medication List - Protect others around you: Learn how to safely use, store and throw away your medicines at www.disposemymeds.org.          This list is accurate as of 5/8/18  9:56 AM.  Always use your most recent med list.                   Brand Name Dispense Instructions for use Diagnosis    * ALLERGEN IMMUNOTHERAPY PRESCRIPTION     5 mL    Name of Mix: Mix #1  Mold Alternaria Tenuis GLY 1:10 w/v, HS  0.5 ml Hormodendrum Cladosporioides 1:10 w/v, HS 0.5 ml Diluent: HSA qs to 5ml    Chronic seasonal allergic rhinitis due to pollen, Allergic rhinitis due to mold       * ALLERGEN IMMUNOTHERAPY PRESCRIPTION     5 mL    Name of Mix: Mix #2  Tree  Julio Cesar, White  GLY 1:20 w/v, HS  0.5 ml Birch Mix GLY 1:20 w/v, HS  0.5 ml Boxelder-Maple  Mix BHR (Boxelder Hard Red) 1:20 w/v, HS  0.5 ml Molt, Common GLY 1:20 w/v, HS  0.5 ml Elm, American GLY 1:20 w/v, HS  0.5 ml Hackberry GLY 1:20 w/v, HS 0.5 ml Hickory, Shagbark GLY 1:20 w/v, HS  0.5 ml Adrian Mix GLY 1:20 w/v, HS 0.5 ml Avera, Black GLY 1:20 w/v, HS 0.5 ml Diluent: HSA qs to 5ml    Chronic seasonal allergic rhinitis due to pollen, Allergic rhinitis due to mold       * ALLERGEN IMMUNOTHERAPY PRESCRIPTION     5 mL    Name of Mix: Mix #3  Weeds Nettle GLY 1:20 w/v, HS 0.5 ml Pigweed, Careless GLY 1:20 w/v, HS 0.5 ml Plantain, English GLY 1:20 w/v, HS 0.5 ml Diluent: HSA qs to 5ml    Chronic seasonal allergic rhinitis due to pollen, Allergic rhinitis due to mold       EPINEPHrine 0.3 MG/0.3ML injection 2-pack    AUVI-Q    2 mL    Inject 0.3 mLs (0.3 mg) into the  muscle as needed for anaphylaxis    Need for desensitization to allergens       fluticasone 50 MCG/ACT spray    FLONASE    3 Bottle    Spray 1-2 sprays into both nostrils daily    Seasonal allergic rhinitis, unspecified allergic rhinitis trigger       omeprazole 20 MG CR capsule    priLOSEC    90 capsule    Take 1 capsule (20 mg) by mouth daily    Abdominal pain, epigastric       * Notice:  This list has 3 medication(s) that are the same as other medications prescribed for you. Read the directions carefully, and ask your doctor or other care provider to review them with you.

## 2018-05-08 NOTE — PROGRESS NOTES
SUBJECTIVE:   Gogo Bourgeois is a 38 year old female who presents to clinic today for the following health issues:      Eye(s) Problem      Duration: x 1month    Description:  Location: right  Pain: no   Redness: no   Discharge: no     Accompanying signs and symptoms: uncomfortable,     History (Trauma, foreign body exposure,): None    Precipitating or alleviating factors (contact use): None    Therapies tried and outcome: None      PROBLEMS TO ADD ON...  Bump behind left ear (x2wks, not painful)  -------------------------------------    Problem list and histories reviewed & adjusted, as indicated.  Additional history: as documented    Patient Active Problem List   Diagnosis     S/P LEEP of cervix     Traction alopecia     S/P gastric bypass     Dyshidrotic eczema     Seasonal allergic rhinitis due to pollen     Allergic rhinitis due to mold     Allergic conjunctivitis, bilateral     Pollen-food allergy syndrome, initial encounter     Breast pain     Past Surgical History:   Procedure Laterality Date     C MAMMOGRAM, W/BILAT. IMPLANTS  2012    in [place]     COSMETIC ABDOMINOPLASTY  2012    plus plast of arms and inner thighs     HC REMOVAL GALLBLADDER  12/2001     LAPAROSCOPIC BYPASS GASTRIC  8/23/11    Sleeve done in [facility], CERVICAL  2002     TUBAL LIGATION  2014       Social History   Substance Use Topics     Smoking status: Never Smoker     Smokeless tobacco: Never Used      Comment: Nonsmoking household     Alcohol use No     Family History   Problem Relation Age of Onset     Hypertension Maternal Grandmother      Respiratory Paternal Grandmother      smoker     Alcohol/Drug Paternal Grandfather      alcohlism, cirrhosis of liver           Reviewed and updated as needed this visit by clinical staff       Reviewed and updated as needed this visit by Provider         ROS:  Constitutional, HEENT, skin systems are negative, except as otherwise noted.    OBJECTIVE:                                                     /68  Pulse 86  Temp 98.1  F (36.7  C) (Tympanic)  Wt 143 lb 6.4 oz (65 kg)  SpO2 99%  BMI 27.1 kg/m2  Body mass index is 27.1 kg/(m^2).  GENERAL APPEARANCE: healthy, alert and no distress  EYES: Eyes grossly normal to inspection and conjunctivae and sclerae normal  LYMPHATICS: small, smooth, mobile subQ mass posterior left ear, non tender  PSYCH: mentation appears normal and affect normal/bright       ASSESSMENT:                                                      1. Eyelid twitch    2. Localized superficial swelling, mass, or lump         PLAN:                                                    Patient reassured regarding the blepharospasm. Likely related to her increase in stress or frequent computer use (eye strain).     The small mass behind her ear could either be a cyst or lymph node. If it increases in size or is still present in 1 month we can image. It could be reactive LAD from her allergies or allergy injections.    The patient was in agreement with the plan today and had no questions or concerns prior to leaving the clinic.     Miladys Dominguez PA-C  [facility]

## 2018-05-22 ENCOUNTER — ALLIED HEALTH/NURSE VISIT (OUTPATIENT)
Dept: ALLERGY | Facility: CLINIC | Age: 38
End: 2018-05-22
Payer: COMMERCIAL

## 2018-05-22 DIAGNOSIS — J30.9 ALLERGIC RHINITIS: Primary | ICD-10-CM

## 2018-05-22 PROCEDURE — 95117 IMMUNOTHERAPY INJECTIONS: CPT

## 2018-05-22 PROCEDURE — 99207 ZZC DROP WITH A PROCEDURE: CPT

## 2018-05-22 NOTE — PROGRESS NOTES
Patient presented after waiting 30 minutes with normal reaction to allergy injections. Discharged from clinic.    Lori Nguyen RN ............   5/22/2018...3:06 PM

## 2018-05-22 NOTE — MR AVS SNAPSHOT
After Visit Summary   5/22/2018    Gogo Bourgeois    MRN: [id number]           Patient Information     Date Of Birth          1980        Visit Information        Provider Department      5/22/2018 2:30 PM FZ ALLERGY SHOTS [facility]        Today's Diagnoses     Allergic rhinitis    -  1       Follow-ups after your visit        Your next 10 appointments already scheduled     May 29, 2018 12:40 PM CDT   Nurse Only with FZ ALLERGY SHOTS   [facility] ([facility])    [address] [address]   [phone]            Jun 12, 2018  3:20 PM CDT   Nurse Only with FZ ALLERGY SHOTS   [facility] ([facility])    [address]  [place] [address]   [phone]            Jun 12, 2018  3:40 PM CDT   Return Visit with Zachary Olivas MD   [facility] ([facility])    [address]  [place] [address]   [phone]            Jun 26, 2018  3:20 PM CDT   Nurse Only with FZ ALLERGY SHOTS   [facility] ([facility])    [address] [facility] [address]   [phone]              Who to contact     If you have questions or need follow up information about today's clinic visit or your schedule please contact [facility] directly at [phone].  Normal or non-critical lab and imaging results will be communicated to you by MyChart, letter or phone within 4 business days after the clinic has received the results. If you do not hear from us within 7 days, please contact the clinic through Supertechart or phone. If you have a critical or abnormal lab result, we will notify you by phone as soon as possible.  Submit refill requests through [employer] or call your pharmacy and they will forward the refill request to us. Please allow 3 business days for your refill to be completed.          Additional Information About Your Visit        [facility] [employer]  Information     Veeda gives you secure access to your electronic health record. If you see a primary care provider, you can also send messages to your care team and make appointments. If you have questions, please call your primary care clinic.  If you do not have a primary care provider, please call [phone] and they will assist you.        Care EveryWhere ID     This is your Care EveryWhere ID. This could be used by other organizations to access your [facility] medical records  [id number]         Blood Pressure from Last 3 Encounters:   05/08/18 100/68   01/02/18 125/81   11/20/17 109/70    Weight from Last 3 Encounters:   05/08/18 65 kg (143 lb 6.4 oz)   01/02/18 64 kg (141 lb)   11/20/17 64.7 kg (142 lb 9.6 oz)              We Performed the Following     Allergy Shot: Two or more injections        Primary Care Provider Office Phone # Fax #    Paul Weber PA-C [phone]       [address] [place] [address]        Equal Access to Services     [facility]: Hadii aad ku hadasho Soomaali, waaxda luqadaha, qaybta kaalmada adeegyada, waxay mora haychente negron . So [facility] [phone].    ATENCIÓN: Si habla español, tiene a arteaga disposición servicios gratuitos de asistencia lingüística. [facility] [phone].    We comply with applicable federal civil rights laws and [place] laws. We do not discriminate on the basis of race, color, national origin, age, disability, sex, sexual orientation, or gender identity.            Thank you!     Thank you for choosing [facility]  for your care. Our goal is always to provide you with excellent care. Hearing back from our patients is one way we can continue to improve our services. Please take a few minutes to complete the written survey that you may receive in the mail after your visit with us. Thank you!             Your Updated Medication List - Protect others around you: Learn how to safely use, store and throw away  your medicines at www.disposemymeds.org.          This list is accurate as of 5/22/18  3:06 PM.  Always use your most recent med list.                   Brand Name Dispense Instructions for use Diagnosis    * ALLERGEN IMMUNOTHERAPY PRESCRIPTION     5 mL    Name of Mix: Mix #1  Mold Alternaria Tenuis GLY 1:10 w/v, HS  0.5 ml Hormodendrum Cladosporioides 1:10 w/v, HS 0.5 ml Diluent: HSA qs to 5ml    Chronic seasonal allergic rhinitis due to pollen, Allergic rhinitis due to mold       * ALLERGEN IMMUNOTHERAPY PRESCRIPTION     5 mL    Name of Mix: Mix #2  Tree  Julio Cesar, White  GLY 1:20 w/v, HS  0.5 ml Birch Mix GLY 1:20 w/v, HS  0.5 ml Boxelder-Maple  Mix BHR (Boxelder Hard Red) 1:20 w/v, HS  0.5 ml Creole, Common GLY 1:20 w/v, HS  0.5 ml Elm, American GLY 1:20 w/v, HS  0.5 ml Hackberry GLY 1:20 w/v, HS 0.5 ml Hickory, Shagbark GLY 1:20 w/v, HS  0.5 ml Weogufka Mix GLY 1:20 w/v, HS 0.5 ml Pinetta, Black GLY 1:20 w/v, HS 0.5 ml Diluent: HSA qs to 5ml    Chronic seasonal allergic rhinitis due to pollen, Allergic rhinitis due to mold       * ALLERGEN IMMUNOTHERAPY PRESCRIPTION     5 mL    Name of Mix: Mix #3  Weeds Nettle GLY 1:20 w/v, HS 0.5 ml Pigweed, Careless GLY 1:20 w/v, HS 0.5 ml Plantain, English GLY 1:20 w/v, HS 0.5 ml Diluent: HSA qs to 5ml    Chronic seasonal allergic rhinitis due to pollen, Allergic rhinitis due to mold       EPINEPHrine 0.3 MG/0.3ML injection 2-pack    AUVI-Q    2 mL    Inject 0.3 mLs (0.3 mg) into the muscle as needed for anaphylaxis    Need for desensitization to allergens       fluticasone 50 MCG/ACT spray    FLONASE    3 Bottle    Spray 1-2 sprays into both nostrils daily    Seasonal allergic rhinitis, unspecified allergic rhinitis trigger       omeprazole 20 MG CR capsule    priLOSEC    90 capsule    Take 1 capsule (20 mg) by mouth daily    Abdominal pain, epigastric       * Notice:  This list has 3 medication(s) that are the same as other medications prescribed for you. Read the  directions carefully, and ask your doctor or other care provider to review them with you.

## 2018-05-29 ENCOUNTER — ALLIED HEALTH/NURSE VISIT (OUTPATIENT)
Dept: ALLERGY | Facility: CLINIC | Age: 38
End: 2018-05-29
Payer: COMMERCIAL

## 2018-05-29 DIAGNOSIS — J30.9 ALLERGIC RHINITIS: Primary | ICD-10-CM

## 2018-05-29 PROCEDURE — 95117 IMMUNOTHERAPY INJECTIONS: CPT

## 2018-05-29 PROCEDURE — 99207 ZZC DROP WITH A PROCEDURE: CPT

## 2018-05-29 NOTE — PROGRESS NOTES
Patient presented after waiting 30 minutes with normal reaction to allergy injections. Discharged from clinic.    Lori Nguyen RN ............   5/29/2018...1:24 PM

## 2018-05-29 NOTE — MR AVS SNAPSHOT
After Visit Summary   5/29/2018    Gogo Bourgeois    MRN: [id number]           Patient Information     Date Of Birth          1980        Visit Information        Provider Department      5/29/2018 12:40 PM FZ ALLERGY SHOTS [facility]        Today's Diagnoses     Allergic rhinitis    -  1       Follow-ups after your visit        Your next 10 appointments already scheduled     Jun 12, 2018  3:20 PM CDT   Nurse Only with FZ ALLERGY SHOTS   [facility] ([facility])    [address] [address]   [phone]            Jun 12, 2018  3:40 PM CDT   Return Visit with Zachary Olivas MD   [facility] ([facility])    [address]  [place] [address] [phone]            Jun 26, 2018  3:20 PM CDT   Nurse Only with FZ ALLERGY SHOTS   [facility] ([facility])    [address]  [place] [address] [phone]              Who to contact     If you have questions or need follow up information about today's clinic visit or your schedule please contact [facility] directly at [phone].  Normal or non-critical lab and imaging results will be communicated to you by Amitivehart, letter or phone within 4 business days after the clinic has received the results. If you do not hear from us within 7 days, please contact the clinic through Amitivehart or phone. If you have a critical or abnormal lab result, we will notify you by phone as soon as possible.  Submit refill requests through [employer] or call your pharmacy and they will forward the refill request to us. Please allow 3 business days for your refill to be completed.          Additional Information About Your Visit        [employer] Information     K[employer] gives you secure access to your electronic health record. If you see a primary care provider, you can also send messages to your care team and make appointments. If  you have questions, please call your primary care clinic.  If you do not have a primary care provider, please call [phone] and they will assist you.        Care EveryWhere ID     This is your Care EveryWhere ID. This could be used by other organizations to access your [facility] medical records  [id number]         Blood Pressure from Last 3 Encounters:   05/08/18 100/68   01/02/18 125/81   11/20/17 109/70    Weight from Last 3 Encounters:   05/08/18 65 kg (143 lb 6.4 oz)   01/02/18 64 kg (141 lb)   11/20/17 64.7 kg (142 lb 9.6 oz)              We Performed the Following     Allergy Shot: Two or more injections        Primary Care Provider Office Phone # Fax #    Paul Weber PA-C [phone] [address] [place] [address]        Equal Access to Services     [facility]: Hadii aad ku hadasho Soomaali, waaxda luqadaha, qaybta kaalmada adeegyada, waxay idiin hayaan adeeg kharadianne laharoonn . So [facility] [phone].    ATENCIÓN: Si habla español, tiene a arteaga disposición servicios gratuitos de asistencia lingüística. Luciana al [phone].    We comply with applicable federal civil rights laws and [place] laws. We do not discriminate on the basis of race, color, national origin, age, disability, sex, sexual orientation, or gender identity.            Thank you!     Thank you for choosing [facility]  for your care. Our goal is always to provide you with excellent care. Hearing back from our patients is one way we can continue to improve our services. Please take a few minutes to complete the written survey that you may receive in the mail after your visit with us. Thank you!             Your Updated Medication List - Protect others around you: Learn how to safely use, store and throw away your medicines at www.disposemymeds.org.          This list is accurate as of 5/29/18  1:24 PM.  Always use your most recent med list.                   Brand Name Dispense Instructions  for use Diagnosis    * ALLERGEN IMMUNOTHERAPY PRESCRIPTION     5 mL    Name of Mix: Mix #1  Mold Alternaria Tenuis GLY 1:10 w/v, HS  0.5 ml Hormodendrum Cladosporioides 1:10 w/v, HS 0.5 ml Diluent: HSA qs to 5ml    Chronic seasonal allergic rhinitis due to pollen, Allergic rhinitis due to mold       * ALLERGEN IMMUNOTHERAPY PRESCRIPTION     5 mL    Name of Mix: Mix #2  Tree  Julio Cesar, White  GLY 1:20 w/v, HS  0.5 ml Birch Mix GLY 1:20 w/v, HS  0.5 ml Boxelder-Maple  Mix BHR (Boxelder Hard Red) 1:20 w/v, HS  0.5 ml Saline, Common GLY 1:20 w/v, HS  0.5 ml Elm, American GLY 1:20 w/v, HS  0.5 ml Hackberry GLY 1:20 w/v, HS 0.5 ml Hickory, Shagbark GLY 1:20 w/v, HS  0.5 ml Napoleon Mix GLY 1:20 w/v, HS 0.5 ml Lutz, Black GLY 1:20 w/v, HS 0.5 ml Diluent: HSA qs to 5ml    Chronic seasonal allergic rhinitis due to pollen, Allergic rhinitis due to mold       * ALLERGEN IMMUNOTHERAPY PRESCRIPTION     5 mL    Name of Mix: Mix #3  Weeds Nettle GLY 1:20 w/v, HS 0.5 ml Pigweed, Careless GLY 1:20 w/v, HS 0.5 ml Plantain, English GLY 1:20 w/v, HS 0.5 ml Diluent: HSA qs to 5ml    Chronic seasonal allergic rhinitis due to pollen, Allergic rhinitis due to mold       EPINEPHrine 0.3 MG/0.3ML injection 2-pack    AUVI-Q    2 mL    Inject 0.3 mLs (0.3 mg) into the muscle as needed for anaphylaxis    Need for desensitization to allergens       fluticasone 50 MCG/ACT spray    FLONASE    3 Bottle    Spray 1-2 sprays into both nostrils daily    Seasonal allergic rhinitis, unspecified allergic rhinitis trigger       omeprazole 20 MG CR capsule    priLOSEC    90 capsule    Take 1 capsule (20 mg) by mouth daily    Abdominal pain, epigastric       * Notice:  This list has 3 medication(s) that are the same as other medications prescribed for you. Read the directions carefully, and ask your doctor or other care provider to review them with you.

## 2018-06-12 ENCOUNTER — OFFICE VISIT (OUTPATIENT)
Dept: ALLERGY | Facility: CLINIC | Age: 38
End: 2018-06-12
Payer: COMMERCIAL

## 2018-06-12 ENCOUNTER — ALLIED HEALTH/NURSE VISIT (OUTPATIENT)
Dept: ALLERGY | Facility: CLINIC | Age: 38
End: 2018-06-12
Payer: COMMERCIAL

## 2018-06-12 VITALS
HEIGHT: 61 IN | WEIGHT: 143.4 LBS | HEART RATE: 77 BPM | OXYGEN SATURATION: 99 % | RESPIRATION RATE: 16 BRPM | SYSTOLIC BLOOD PRESSURE: 114 MMHG | BODY MASS INDEX: 27.08 KG/M2 | DIASTOLIC BLOOD PRESSURE: 80 MMHG | TEMPERATURE: 98.5 F

## 2018-06-12 DIAGNOSIS — H10.13 ALLERGIC CONJUNCTIVITIS, BILATERAL: ICD-10-CM

## 2018-06-12 DIAGNOSIS — J30.89 ALLERGIC RHINITIS DUE TO MOLD: ICD-10-CM

## 2018-06-12 DIAGNOSIS — J30.9 ALLERGIC RHINITIS: Primary | ICD-10-CM

## 2018-06-12 DIAGNOSIS — J30.1 CHRONIC SEASONAL ALLERGIC RHINITIS DUE TO POLLEN: Primary | ICD-10-CM

## 2018-06-12 DIAGNOSIS — J30.1 CHRONIC SEASONAL ALLERGIC RHINITIS DUE TO POLLEN: ICD-10-CM

## 2018-06-12 DIAGNOSIS — T78.1XXD POLLEN-FOOD ALLERGY, SUBSEQUENT ENCOUNTER: ICD-10-CM

## 2018-06-12 PROBLEM — T78.1XXA POLLEN-FOOD ALLERGY SYNDROME: Status: ACTIVE | Noted: 2017-06-15

## 2018-06-12 PROCEDURE — 99213 OFFICE O/P EST LOW 20 MIN: CPT | Performed by: ALLERGY & IMMUNOLOGY

## 2018-06-12 PROCEDURE — 95117 IMMUNOTHERAPY INJECTIONS: CPT

## 2018-06-12 PROCEDURE — 99207 ZZC DROP WITH A PROCEDURE: CPT

## 2018-06-12 RX ORDER — CETIRIZINE HYDROCHLORIDE 10 MG/1
10-20 TABLET ORAL DAILY
COMMUNITY
Start: 2018-06-12

## 2018-06-12 RX ORDER — FLUTICASONE PROPIONATE 50 MCG
1-2 SPRAY, SUSPENSION (ML) NASAL DAILY
Qty: 3 BOTTLE | Refills: 3 | Status: SHIPPED | OUTPATIENT
Start: 2018-06-12 | End: 2018-09-14

## 2018-06-12 NOTE — MR AVS SNAPSHOT
After Visit Summary   6/12/2018    Gogo Bourgeois    MRN: [id number]           Patient Information     Date Of Birth          1980        Visit Information        Provider Department      6/12/2018 3:40 PM Zachary Olivas MD [facility]        Today's Diagnoses     Chronic seasonal allergic rhinitis due to pollen    -  1    Allergic rhinitis due to mold        Allergic conjunctivitis, bilateral        Pollen-food allergy, subsequent encounter           Follow-ups after your visit        Your next 10 appointments already scheduled     Jun 26, 2018  3:40 PM CDT   Nurse Only with FZ ALLERGY SHOTS   [facility] ([facility]    [address] [address] [phone]              Who to contact     If you have questions or need follow up information about today's clinic visit or your schedule please contact [facility] directly at [phone].  Normal or non-critical lab and imaging results will be communicated to you by MyChart, letter or phone within 4 business days after the clinic has received the results. If you do not hear from us within 7 days, please contact the clinic through E-[employer] or phone. If you have a critical or abnormal lab result, we will notify you by phone as soon as possible.  Submit refill requests through [employer] or call your pharmacy and they will forward the refill request to us. Please allow 3 business days for your refill to be completed.          Additional Information About Your Visit        MyChart Information     [employer] gives you secure access to your electronic health record. If you see a primary care provider, you can also send messages to your care team and make appointments. If you have questions, please call your primary care clinic.  If you do not have a primary care provider, please call [phone] and they will assist you.        Care EveryWhere ID     This is your Care EveryWhere ID. This  "could be used by other organizations to access your [facility] medical records  [id number]        Your Vitals Were     Pulse Temperature Respirations Height Pulse Oximetry BMI (Body Mass Index)    77 98.5  F (36.9  C) (Oral) 16 1.549 m (5' 1\") 99% 27.1 kg/m2       Blood Pressure from Last 3 Encounters:   06/12/18 114/80   05/08/18 100/68   01/02/18 125/81    Weight from Last 3 Encounters:   06/12/18 65 kg (143 lb 6.4 oz)   05/08/18 65 kg (143 lb 6.4 oz)   01/02/18 64 kg (141 lb)              Today, you had the following     No orders found for display         Where to get your medicines      These medications were sent to [place] [id number] [place] [facility] [place] - [address]  [address] [address]     Phone:  [phone]     fluticasone 50 MCG/ACT spray          Primary Care Provider Office Phone # Fax #    Paul Weber PA-C [phone] [address] [place] [address]        Equal Access to Services     [facility]: Hadii madai randhawa hadasho Soomaali, waaxda luqadaha, qaybta kaalmada adeegyada, rema umaña. So [facility] [phone].    ATENCIÓN: Si habla español, tiene a arteaga disposición servicios gratuitos de asistencia lingüística. [facility] [phone].    We comply with applicable federal civil rights laws and [place] laws. We do not discriminate on the basis of race, color, national origin, age, disability, sex, sexual orientation, or gender identity.            Thank you!     Thank you for choosing [facility]  for your care. Our goal is always to provide you with excellent care. Hearing back from our patients is one way we can continue to improve our services. Please take a few minutes to complete the written survey that you may receive in the mail after your visit with us. Thank you!             Your Updated Medication List - Protect others around you: Learn how to safely use, store and throw away your " medicines at www.disposemymeds.org.          This list is accurate as of 6/12/18  7:02 PM.  Always use your most recent med list.                   Brand Name Dispense Instructions for use Diagnosis    * ALLERGEN IMMUNOTHERAPY PRESCRIPTION     5 mL    Name of Mix: Mix #1  Mold Alternaria Tenuis GLY 1:10 w/v, HS  0.5 ml Hormodendrum Cladosporioides 1:10 w/v, HS 0.5 ml Diluent: HSA qs to 5ml    Chronic seasonal allergic rhinitis due to pollen, Allergic rhinitis due to mold       * ALLERGEN IMMUNOTHERAPY PRESCRIPTION     5 mL    Name of Mix: Mix #2  Tree  Julio Cesar, White  GLY 1:20 w/v, HS  0.5 ml Birch Mix GLY 1:20 w/v, HS  0.5 ml Boxelder-Maple  Mix BHR (Boxelder Hard Red) 1:20 w/v, HS  0.5 ml Lee, Common GLY 1:20 w/v, HS  0.5 ml Elm, American GLY 1:20 w/v, HS  0.5 ml Hackberry GLY 1:20 w/v, HS 0.5 ml Hickory, Shagbark GLY 1:20 w/v, HS  0.5 ml Baxter Mix GLY 1:20 w/v, HS 0.5 ml [place], Black GLY 1:20 w/v, HS 0.5 ml Diluent: HSA qs to 5ml    Chronic seasonal allergic rhinitis due to pollen, Allergic rhinitis due to mold       * ALLERGEN IMMUNOTHERAPY PRESCRIPTION     5 mL    Name of Mix: Mix #3  Weeds Nettle GLY 1:20 w/v, HS 0.5 ml Pigweed, Careless GLY 1:20 w/v, HS 0.5 ml Plantain, English GLY 1:20 w/v, HS 0.5 ml Diluent: HSA qs to 5ml    Chronic seasonal allergic rhinitis due to pollen, Allergic rhinitis due to mold       cetirizine 10 MG tablet    zyrTEC     Take 1-2 tablets (10-20 mg) by mouth daily        EPINEPHrine 0.3 MG/0.3ML injection 2-pack    AUVI-Q    2 mL    Inject 0.3 mLs (0.3 mg) into the muscle as needed for anaphylaxis    Need for desensitization to allergens       fluticasone 50 MCG/ACT spray    FLONASE    3 Bottle    Spray 1-2 sprays into both nostrils daily    Chronic seasonal allergic rhinitis due to pollen       omeprazole 20 MG CR capsule    priLOSEC    90 capsule    Take 1 capsule (20 mg) by mouth daily    Abdominal pain, epigastric       * Notice:  This list has 3 medication(s) that are  the same as other medications prescribed for you. Read the directions carefully, and ask your doctor or other care provider to review them with you.

## 2018-06-12 NOTE — PROGRESS NOTES
Gogo Bourgeois was seen in the Allergy Clinic at [facility]. The following are my recommendations regarding her Allergic Rhinitis Due to Pollen, Allergic Rhinitis Due to Mold, Allergic Conjunctivitis and Food Pollen Syndrome    1. Continue allergen immunotherapy per protocol to complete 5 years of treatment  2. Continue cetirizine 10-20mg daily  3. Continue fluticasone nasal spray, 1 spray in each nostril twice daily  4. Advised complete avoidance of almond and raw carrot  5. Follow-up in 1 year, sooner if needed      Gogo Bourgeois is a 38 year old [place] female who is seen today for follow-up of allergic rhinoconjunctivitis. She began immunotherapy treatment on 8/1/17. This spring she reports that she has continued to have fairly intense allergy symptoms though better than in prior years. Her symptoms have not been affecting her sleep as they have in the past. Gogo does also note that she has become more sensitive to nonallergic triggers including dust and perfumes. She becomes very itchy when around dust and starts to sneeze. When wearing certain perfumes she has also had more sneezing and itchy nose as well. Ggoo continues to use flonase daily along with cetirizine.    Gogo states that she continues to have symptoms of itchy mouth and swelling or bumps on her lips after eating almonds and raw carrots. Her significant other has started a new diet that includes more almonds and she is wondering if it is safe for her to continue with this diet as well.      Past Medical History:   Diagnosis Date     Abnormal Pap smear of cervix     treated w/ cryotherapy in 2002     ASCUS on Pap smear 10/2013    Neg for high risk HPV     Esophageal reflux      LSIL (low grade squamous intraepithelial lesion) on Pap smear 5/7/13    repeat pap 6 months     Overweight, obesity and other hyperalimentation      Papanicolaou smear of cervix with low grade squamous intraepithelial lesion (LGSIL) 6/15/12    7/2012 colp -  SUE 1     SAB (spontaneous )     balanced transslocation of chromosones       REVIEW OF SYSTEMS:  General: negative for weight gain. negative for weight loss. negative for changes in sleep.   Eyes: negative for itching. negative for redness. negative for tearing/watering. negative for vision changes  Ears: negative for fullness. negative for hearing loss. negative for dizziness.   Nose: positive  for snoring.negative for changes in smell. negative for drainage.   Throat: negative for hoarseness. positive  for sore throat. negative for trouble swallowing.   Lungs: negative for cough. negative for shortness of breath.negative for wheezing. negative for sputum production.   Cardiovascular: negative for chest pain. negative for swelling of ankles. negative for fast or irregular heartbeat.   Gastrointestinal: negative for nausea. negative for heartburn. negative for acid reflux.   Musculoskeletal: negative for joint pain. negative for joint stiffness. negative for joint swelling.   Neurologic: negative for seizures. negative for fainting. negative for weakness.   Psychiatric: negative for changes in mood. negative for anxiety.   Endocrine: negative for cold intolerance. negative for heat intolerance. negative for tremors.   Hematologic: positive  for easy bruising. negative for easy bleeding.  Integumentary: negative for rash. negative for scaling. negative for nail changes.       Current Outpatient Prescriptions:      EPINEPHrine (AUVI-Q) 0.3 MG/0.3ML injection, Inject 0.3 mLs (0.3 mg) into the muscle as needed for anaphylaxis, Disp: 2 mL, Rfl: 2     fluticasone (FLONASE) 50 MCG/ACT spray, Spray 1-2 sprays into both nostrils daily, Disp: 3 Bottle, Rfl: 11     omeprazole (PRILOSEC) 20 MG CR capsule, Take 1 capsule (20 mg) by mouth daily, Disp: 90 capsule, Rfl: 3     ORDER FOR ALLERGEN IMMUNOTHERAPY, Name of Mix: Mix #1  Mold Alternaria Tenuis GLY 1:10 w/v, HS  0.5 ml Hormodendrum Cladosporioides 1:10 w/v, HS 0.5  "ml Diluent: HSA qs to 5ml, Disp: 5 mL, Rfl: PRN     ORDER FOR ALLERGEN IMMUNOTHERAPY, Name of Mix: Mix #2  Tree  Julio Cesar, White  GLY 1:20 w/v, HS  0.5 ml Birch Mix GLY 1:20 w/v, HS  0.5 ml Boxelder-Maple  Mix BHR (Boxelder Hard Red) 1:20 w/v, HS  0.5 ml [place], Common GLY 1:20 w/v, HS  0.5 ml Elm, American GLY 1:20 w/v, HS  0.5 ml Hackberry GLY 1:20 w/v, HS 0.5 ml Hickory, Shagbark GLY 1:20 w/v, HS  0.5 ml [place] Mix GLY 1:20 w/v, HS 0.5 ml [place], Black GLY 1:20 w/v, HS 0.5 ml Diluent: HSA qs to 5ml, Disp: 5 mL, Rfl: PRN     ORDER FOR ALLERGEN IMMUNOTHERAPY, Name of Mix: Mix #3  Weeds Nettle GLY 1:20 w/v, HS 0.5 ml Pigweed, Careless GLY 1:20 w/v, HS 0.5 ml Plantain, English GLY 1:20 w/v, HS 0.5 ml Diluent: HSA qs to 5ml, Disp: 5 mL, Rfl: PRN    EXAM:   /80 (BP Location: Left arm, Patient Position: Chair, Cuff Size: Adult Regular)  Pulse 77  Temp 98.5  F (36.9  C) (Oral)  Resp 16  Ht 1.549 m (5' 1\")  Wt 65 kg (143 lb 6.4 oz)  SpO2 99%  BMI 27.1 kg/m2  GENERAL APPEARANCE: alert, cooperative and not in distress  SKIN: no rashes, no lesions  HEAD: atraumatic, normocephalic  ENT: no scars or lesions, nasal exam showed no discharge, swelling or lesions noted, tongue midline and normal, soft palate, uvula, and tonsils normal  NECK: no asymmetry, masses, or scars, supple without significant adenopathy  LUNGS: unlabored respirations, no intercostal retractions or accessory muscle use, clear to auscultation without rales or wheezes  HEART: regular rate and rhythm without murmurs and normal S1 and S2  MUSCULOSKELETAL: no musculoskeletal defects are noted  NEURO: no focal deficits noted  PSYCH: does not appear depressed or anxious      WORKUP:  None    ASSESSMENT/PLAN:  Gogo Bourgeois is a 38 year old female here for follow-up of allergic rhinoconjunctivitis and food-pollen syndrome. She has completed nearly 1 year of immunotherapy treatment and has now reached her maintenance dose. This spring she has " continued to have significant allergy symptoms though they are not as severe as previous years. Gogo continues to manage with zyrtec and flonase. She was advised to avoid all almond and raw carrots. Though her symptoms have been fairly mild thus far, 2-10% of those with food-pollen syndrome may develop more severe IgE mediated allergic reactions.    1. Continue allergen immunotherapy per protocol to complete 5 years of treatment  2. Continue cetirizine 10-20mg daily  3. Continue fluticasone nasal spray, 1 spray in each nostril twice daily  4. Advised complete avoidance of almond and raw carrot  5. Follow-up in 1 year, sooner if needed      Zachary Olivas MD  Allergy/Immunology  [facility] and [place], [place]      Chart documentation done in part with Dragon Voice Recognition Software. Although reviewed after completion, some word and grammatical errors may remain.

## 2018-06-12 NOTE — TELEPHONE ENCOUNTER
ALLERGY SOLUTION RE-ORDER REQUEST    Gogo Bourgeois 1980 MRN: [id number]    DATE NEEDED:  7/3/2018  Vial Color Content   Top Dose       Last Dose     Vial Size  Red 1:1 Trees   Red 1:1 0.5       Red 1:10.5     5ml  Red 1:1 Molds   Red 1:1 0.5       Red 1:10.5     5ml  Red 1:1 Weeds   Red 1:1 0.5       Red 1:10.5     5ml        Serum reorder consent signed and patient/parent was advised that new serums would be ordered through the pharmacy and billed to their insurance company when they arrive in clinic. Yes    [facility] Location:  [facility]  Ship to Location: [facility]  Special Instructions:        Updated Prescription Needed: No      Requester Signature  Yamilka Abdalla

## 2018-06-12 NOTE — MR AVS SNAPSHOT
After Visit Summary   6/12/2018    Gogo Bourgeois    MRN: [id number]           Patient Information     Date Of Birth          1980        Visit Information        Provider Department      6/12/2018 3:20 PM [facility]        Today's Diagnoses     Allergic rhinitis    -  1       Follow-ups after your visit        Your next 10 appointments already scheduled     Jun 26, 2018  3:40 PM CDT   Nurse Only with [facility]   [facility] ([facility])    [address]  [place] [address] [phone]              Who to contact     If you have questions or need follow up information about today's clinic visit or your schedule please contact [facility] directly at [phone].  Normal or non-critical lab and imaging results will be communicated to you by MyChart, letter or phone within 4 business days after the clinic has received the results. If you do not hear from us within 7 days, please contact the clinic through MyChart or phone. If you have a critical or abnormal lab result, we will notify you by phone as soon as possible.  Submit refill requests through [employer] or call your pharmacy and they will forward the refill request to us. Please allow 3 business days for your refill to be completed.          Additional Information About Your Visit        MyChart Information     [employer] gives you secure access to your electronic health record. If you see a primary care provider, you can also send messages to your care team and make appointments. If you have questions, please call your primary care clinic.  If you do not have a primary care provider, please call [phone] and they will assist you.        Care EveryWhere ID     This is your Care EveryWhere ID. This could be used by other organizations to access your [place] medical records  [id number]         Blood Pressure from Last 3 Encounters:   06/12/18 114/80    05/08/18 100/68   01/02/18 125/81    Weight from Last 3 Encounters:   06/12/18 65 kg (143 lb 6.4 oz)   05/08/18 65 kg (143 lb 6.4 oz)   01/02/18 64 kg (141 lb)              We Performed the Following     Allergy Shot: Two or more injections          Where to get your medicines      These medications were sent to [facility] [address] [facility] - [place], [place] - [address]  [address] [place] [facility] [address]     Phone:  [phone]     fluticasone 50 MCG/ACT spray          Primary Care Provider Office Phone # Fax #    Paul Weber PA-C [phone] [address] [place] [address]        Equal Access to Services     [facility]: Hadii aad ku hadasho Soomaali, waaxda luqadaha, qaybta kaalmada adeegyada, waxay janisin hayaan micaela negron . So [facility] [phone].    ATENCIÓN: Si habla español, tiene a arteaga disposición servicios gratuitos de asistencia lingüística. Luciana al [phone].    We comply with applicable federal civil rights laws and [place] laws. We do not discriminate on the basis of race, color, national origin, age, disability, sex, sexual orientation, or gender identity.            Thank you!     Thank you for choosing [facility]  for your care. Our goal is always to provide you with excellent care. Hearing back from our patients is one way we can continue to improve our services. Please take a few minutes to complete the written survey that you may receive in the mail after your visit with us. Thank you!             Your Updated Medication List - Protect others around you: Learn how to safely use, store and throw away your medicines at www.disposemymeds.org.          This list is accurate as of 6/12/18  4:05 PM.  Always use your most recent med list.                   Brand Name Dispense Instructions for use Diagnosis    * ALLERGEN IMMUNOTHERAPY PRESCRIPTION     5 mL    Name of Mix: Mix #1  Mold Alternaria Tenuis GLY 1:10 w/v, HS  0.5 ml  Hormodendrum Cladosporioides 1:10 w/v, HS 0.5 ml Diluent: HSA qs to 5ml    Chronic seasonal allergic rhinitis due to pollen, Allergic rhinitis due to mold       * ALLERGEN IMMUNOTHERAPY PRESCRIPTION     5 mL    Name of Mix: Mix #2  Tree  Julio Cesar, White  GLY 1:20 w/v, HS  0.5 ml Birch Mix GLY 1:20 w/v, HS  0.5 ml Boxelder-Maple  Mix BHR (Boxelder Hard Red) 1:20 w/v, HS  0.5 ml [place], Common GLY 1:20 w/v, HS  0.5 ml Elm, American GLY 1:20 w/v, HS  0.5 ml Hackberry GLY 1:20 w/v, HS 0.5 ml Hickory, Shagbark GLY 1:20 w/v, HS  0.5 ml [place] Mix GLY 1:20 w/v, HS 0.5 ml [place], Black GLY 1:20 w/v, HS 0.5 ml Diluent: HSA qs to 5ml    Chronic seasonal allergic rhinitis due to pollen, Allergic rhinitis due to mold       * ALLERGEN IMMUNOTHERAPY PRESCRIPTION     5 mL    Name of Mix: Mix #3  Weeds Nettle GLY 1:20 w/v, HS 0.5 ml Pigweed, Careless GLY 1:20 w/v, HS 0.5 ml Plantain, English GLY 1:20 w/v, HS 0.5 ml Diluent: HSA qs to 5ml    Chronic seasonal allergic rhinitis due to pollen, Allergic rhinitis due to mold       cetirizine 10 MG tablet    zyrTEC     Take 1-2 tablets (10-20 mg) by mouth daily        EPINEPHrine 0.3 MG/0.3ML injection 2-pack    AUVI-Q    2 mL    Inject 0.3 mLs (0.3 mg) into the muscle as needed for anaphylaxis    Need for desensitization to allergens       fluticasone 50 MCG/ACT spray    FLONASE    3 Bottle    Spray 1-2 sprays into both nostrils daily    Chronic seasonal allergic rhinitis due to pollen       omeprazole 20 MG CR capsule    priLOSEC    90 capsule    Take 1 capsule (20 mg) by mouth daily    Abdominal pain, epigastric       * Notice:  This list has 3 medication(s) that are the same as other medications prescribed for you. Read the directions carefully, and ask your doctor or other care provider to review them with you.

## 2018-06-12 NOTE — LETTER
6/12/2018         RE: Gogo Bourgeois  [address] [address] [address]        Dear Colleague,    Thank you for referring your patient, Gogo Bourgeois, to the [facility]. Please see a copy of my visit note below.    Gogo Bourgeois was seen in the Allergy Clinic at [facility]. The following are my recommendations regarding her Allergic Rhinitis Due to Pollen, Allergic Rhinitis Due to Mold, Allergic Conjunctivitis and Food Pollen Syndrome    1. Continue allergen immunotherapy per protocol to complete 5 years of treatment  2. Continue cetirizine 10-20mg daily  3. Continue fluticasone nasal spray, 1 spray in each nostril twice daily  4. Advised complete avoidance of almond and raw carrot  5. Follow-up in 1 year, sooner if needed      Gogo Bourgeois is a 38 year old [place] female who is seen today for follow-up of allergic rhinoconjunctivitis. She began immunotherapy treatment on 8/1/17. This spring she reports that she has continued to have fairly intense allergy symptoms though better than in prior years. Her symptoms have not been affecting her sleep as they have in the past. Gogo does also note that she has become more sensitive to nonallergic triggers including dust and perfumes. She becomes very itchy when around dust and starts to sneeze. When wearing certain perfumes she has also had more sneezing and itchy nose as well. Gogo continues to use flonase daily along with cetirizine.    Gogo states that she continues to have symptoms of itchy mouth and swelling or bumps on her lips after eating almonds and raw carrots. Her significant other has started a new diet that includes more almonds and she is wondering if it is safe for her to continue with this diet as well.      Past Medical History:   Diagnosis Date     Abnormal Pap smear of cervix     treated w/ cryotherapy in 2002     ASCUS on Pap smear 10/2013    Neg for high risk HPV     Esophageal reflux      LSIL (low  grade squamous intraepithelial lesion) on Pap smear 13    repeat pap 6 months     Overweight, obesity and other hyperalimentation      Papanicolaou smear of cervix with low grade squamous intraepithelial lesion (LGSIL) 6/15/12    2012 colp - SUE 1     SAB (spontaneous )     balanced transslocation of chromosones       REVIEW OF SYSTEMS:  General: negative for weight gain. negative for weight loss. negative for changes in sleep.   Eyes: negative for itching. negative for redness. negative for tearing/watering. negative for vision changes  Ears: negative for fullness. negative for hearing loss. negative for dizziness.   Nose: positive  for snoring.negative for changes in smell. negative for drainage.   Throat: negative for hoarseness. positive  for sore throat. negative for trouble swallowing.   Lungs: negative for cough. negative for shortness of breath.negative for wheezing. negative for sputum production.   Cardiovascular: negative for chest pain. negative for swelling of ankles. negative for fast or irregular heartbeat.   Gastrointestinal: negative for nausea. negative for heartburn. negative for acid reflux.   Musculoskeletal: negative for joint pain. negative for joint stiffness. negative for joint swelling.   Neurologic: negative for seizures. negative for fainting. negative for weakness.   Psychiatric: negative for changes in mood. negative for anxiety.   Endocrine: negative for cold intolerance. negative for heat intolerance. negative for tremors.   Hematologic: positive  for easy bruising. negative for easy bleeding.  Integumentary: negative for rash. negative for scaling. negative for nail changes.       Current Outpatient Prescriptions:      EPINEPHrine (AUVI-Q) 0.3 MG/0.3ML injection, Inject 0.3 mLs (0.3 mg) into the muscle as needed for anaphylaxis, Disp: 2 mL, Rfl: 2     fluticasone (FLONASE) 50 MCG/ACT spray, Spray 1-2 sprays into both nostrils daily, Disp: 3 Bottle, Rfl: 11      "omeprazole (PRILOSEC) 20 MG CR capsule, Take 1 capsule (20 mg) by mouth daily, Disp: 90 capsule, Rfl: 3     ORDER FOR ALLERGEN IMMUNOTHERAPY, Name of Mix: Mix #1  Mold Alternaria Tenuis GLY 1:10 w/v, HS  0.5 ml Hormodendrum Cladosporioides 1:10 w/v, HS 0.5 ml Diluent: HSA qs to 5ml, Disp: 5 mL, Rfl: PRN     ORDER FOR ALLERGEN IMMUNOTHERAPY, Name of Mix: Mix #2  Tree  Julio Cesar, White  GLY 1:20 w/v, HS  0.5 ml Birch Mix GLY 1:20 w/v, HS  0.5 ml Boxelder-Maple  Mix BHR (Boxelder Hard Red) 1:20 w/v, HS  0.5 ml [place], Common GLY 1:20 w/v, HS  0.5 ml Elm, American GLY 1:20 w/v, HS  0.5 ml Hackberry GLY 1:20 w/v, HS 0.5 ml Hickory, Shagbark GLY 1:20 w/v, HS  0.5 ml [place] Mix GLY 1:20 w/v, HS 0.5 ml [place], Black GLY 1:20 w/v, HS 0.5 ml Diluent: HSA qs to 5ml, Disp: 5 mL, Rfl: PRN     ORDER FOR ALLERGEN IMMUNOTHERAPY, Name of Mix: Mix #3  Weeds Nettle GLY 1:20 w/v, HS 0.5 ml Pigweed, Careless GLY 1:20 w/v, HS 0.5 ml Plantain, English GLY 1:20 w/v, HS 0.5 ml Diluent: HSA qs to 5ml, Disp: 5 mL, Rfl: PRN    EXAM:   /80 (BP Location: Left arm, Patient Position: Chair, Cuff Size: Adult Regular)  Pulse 77  Temp 98.5  F (36.9  C) (Oral)  Resp 16  Ht 1.549 m (5' 1\")  Wt 65 kg (143 lb 6.4 oz)  SpO2 99%  BMI 27.1 kg/m2  GENERAL APPEARANCE: alert, cooperative and not in distress  SKIN: no rashes, no lesions  HEAD: atraumatic, normocephalic  ENT: no scars or lesions, nasal exam showed no discharge, swelling or lesions noted, tongue midline and normal, soft palate, uvula, and tonsils normal  NECK: no asymmetry, masses, or scars, supple without significant adenopathy  LUNGS: unlabored respirations, no intercostal retractions or accessory muscle use, clear to auscultation without rales or wheezes  HEART: regular rate and rhythm without murmurs and normal S1 and S2  MUSCULOSKELETAL: no musculoskeletal defects are noted  NEURO: no focal deficits noted  PSYCH: does not appear depressed or anxious      WORKUP:  " None    ASSESSMENT/PLAN:  Gogo Bourgeois is a 38 year old female here for follow-up of allergic rhinoconjunctivitis and food-pollen syndrome. She has completed nearly 1 year of immunotherapy treatment and has now reached her maintenance dose. This spring she has continued to have significant allergy symptoms though they are not as severe as previous years. Gogo continues to manage with zyrtec and flonase. She was advised to avoid all almond and raw carrots. Though her symptoms have been fairly mild thus far, 2-10% of those with food-pollen syndrome may develop more severe IgE mediated allergic reactions.    1. Continue allergen immunotherapy per protocol to complete 5 years of treatment  2. Continue cetirizine 10-20mg daily  3. Continue fluticasone nasal spray, 1 spray in each nostril twice daily  4. Advised complete avoidance of almond and raw carrot  5. Follow-up in 1 year, sooner if needed      Zachary Olivas MD  Allergy/Immunology  [place], [place]      Chart documentation done in part with Dragon Voice Recognition Software. Although reviewed after completion, some word and grammatical errors may remain.      Again, thank you for allowing me to participate in the care of your patient.        Sincerely,        Zachary Olivas MD

## 2018-06-12 NOTE — PROGRESS NOTES
Patient presented after waiting 30 minutes with normal reaction to allergy injections. Discharged from clinic.    Lori Nguyen RN ............   6/12/2018...4:05 PM

## 2018-06-26 ENCOUNTER — ALLIED HEALTH/NURSE VISIT (OUTPATIENT)
Dept: ALLERGY | Facility: CLINIC | Age: 38
End: 2018-06-26
Payer: COMMERCIAL

## 2018-06-26 DIAGNOSIS — J30.1 CHRONIC SEASONAL ALLERGIC RHINITIS DUE TO POLLEN: Primary | ICD-10-CM

## 2018-06-26 DIAGNOSIS — J30.9 ALLERGIC RHINITIS: Primary | ICD-10-CM

## 2018-06-26 PROCEDURE — 99207 ZZC DROP WITH A PROCEDURE: CPT

## 2018-06-26 PROCEDURE — 95165 ANTIGEN THERAPY SERVICES: CPT | Performed by: ALLERGY & IMMUNOLOGY

## 2018-06-26 PROCEDURE — 95117 IMMUNOTHERAPY INJECTIONS: CPT

## 2018-06-26 NOTE — PROGRESS NOTES
Allergy serums billed at [facility].     Vials received below:    Vial Color Content                      Vial Size Expiration Date  Red 1:1 Weeds 5 mL  6/18/2019  Red 1:1 Trees 5 mL  6/18/2019  Red 1:1 Molds 5 mL  6/18/2019      Original Refill encounter date: 6/12/2018      Signature  Katelyn Rowley

## 2018-06-26 NOTE — PROGRESS NOTES
Patient presented after waiting 30 minutes with normal reaction to allergy injections. Discharged from clinic.    Lori Nguyen RN ............   6/26/2018...4:15 PM

## 2018-06-26 NOTE — TELEPHONE ENCOUNTER
Allergy serums received at [facility] [place].     Vials received below:    Vial Color Content                      Vial Size Expiration Date  Red 1:1 Weeds 5 mL  6/18/2019  Red 1:1 Trees 5 mL  6/18/2019  Red 1:1 Molds 5 mL  6/18/2019      Signature  Katelyn Rowley

## 2018-06-26 NOTE — MR AVS SNAPSHOT
After Visit Summary   6/26/2018    Gogo Bourgeois    MRN: [id number]           Patient Information     Date Of Birth          1980        Visit Information        Provider Department      6/26/2018 3:40 PM FZ [facility]ERGY [facility]OT[facility]        Today's Diagnoses     Allergic rhinitis    -  1       Follow-ups after your visit        Your next 10 appointments already scheduled     Jul 10, 2018 12:30 PM CDT   Nurse Only with FZ ALLERGY SHOTS   [facility] ([facility])    [address] [address]   [phone]            Jul 24, 2018  3:20 PM CDT   Nurse Only with FZ ALLERGY SHOTS   [facility] ([facility])    [address]  [place] [address]   [phone]            Jul 31, 2018 12:40 PM CDT   Nurse Only with FZ ALLERGY SHOTS   [facility] ([facility])    [address]  [place] [address]   [phone]              Who to contact     If you have questions or need follow up information about today's clinic visit or your schedule please contact [facility] directly at [phone].  Normal or non-critical lab and imaging results will be communicated to you by [employer], letter or phone within 4 business days after the clinic has received the results. If you do not hear from us within 7 days, please contact the clinic through [employer] or phone. If you have a critical or abnormal lab result, we will notify you by phone as soon as possible.  Submit refill requests through [employer] or call your pharmacy and they will forward the refill request to us. Please allow 3 business days for your refill to be completed.          Additional Information About Your Visit        [employer] Information     [employer] gives you secure access to your electronic health record. If you see a primary care provider, you can also send messages to your care team and make appointments. If you  have questions, please call your primary care clinic.  If you do not have a primary care provider, please call [phone] and they will assist you.        Care EveryWhere ID     This is your Care EveryWhere ID. This could be used by other organizations to access your [facility] medical records  [id number]         Blood Pressure from Last 3 Encounters:   06/12/18 114/80   05/08/18 100/68   01/02/18 125/81    Weight from Last 3 Encounters:   06/12/18 65 kg (143 lb 6.4 oz)   05/08/18 65 kg (143 lb 6.4 oz)   01/02/18 64 kg (141 lb)              We Performed the Following     Allergy Shot: Two or more injections        Primary Care Provider Office Phone # Fax #    Paul Weber PA-C [phone] [address] [place] [address]        Equal Access to Services     [facility]: Hadii aad ku hadasho Soomaali, waaxda luqadaha, qaybta kaalmada adeegyada, waxay idiin hayaan adeeg kharash la'aan . So [facility] [phone].    ATENCIÓN: Si habla español, tiene a arteaga disposición servicios gratuitos de asistencia lingüística. Luciana al [phone].    We comply with applicable federal civil rights laws and [place] laws. We do not discriminate on the basis of race, color, national origin, age, disability, sex, sexual orientation, or gender identity.            Thank you!     Thank you for choosing [facility]  for your care. Our goal is always to provide you with excellent care. Hearing back from our patients is one way we can continue to improve our services. Please take a few minutes to complete the written survey that you may receive in the mail after your visit with us. Thank you!             Your Updated Medication List - Protect others around you: Learn how to safely use, store and throw away your medicines at www.disposemymeds.org.          This list is accurate as of 6/26/18  4:15 PM.  Always use your most recent med list.                   Brand Name Dispense Instructions for use  Diagnosis    ALLERGEN IMMUNOTHERAPY PRESCRIPTION     5 mL    Name of Mix: Mix #1  Mold Alternaria Tenuis 1:10 w/v, HS  0.5 ml Hormodendrum Cladosporioides 1:10 w/v, HS 0.5 ml Diluent: HSA qs to 5ml    Chronic seasonal allergic rhinitis due to pollen, Allergic rhinitis due to mold       ALLERGEN IMMUNOTHERAPY PRESCRIPTION     5 mL    Name of Mix: Mix #2  Tree  Julio Cesar, White 1:20 w/v, HS  0.5 ml Birch Mix PRW 1:20 w/v, HS  0.5 ml Boxelder-Maple Mix BHR (Boxelder Hard Red) 1:20 w/v, HS  0.5 ml [place], Common 1:20 w/v, HS  0.5 ml Elm, American 1:20 w/v, HS  0.5 ml Hackberry 1:20 w/v, HS 0.5 ml Hickory, Shagbark 1:20 w/v, HS  0.5 ml Media Mix RW 1:20 w/v, HS 0.5 ml [place], Black 1:20 w/v, HS 0.5 ml Diluent: HSA qs to 5ml    Chronic seasonal allergic rhinitis due to pollen, Allergic rhinitis due to mold       ALLERGEN IMMUNOTHERAPY PRESCRIPTION     5 mL    Name of Mix: Mix #3  Weeds Nettle 1:20 w/v, HS 0.5 ml Pigweed, Careless 1:20 w/v, HS 0.5 ml Plantain, English 1:20 w/v, HS 0.5 ml Diluent: HSA qs to 5ml    Chronic seasonal allergic rhinitis due to pollen, Allergic rhinitis due to mold       cetirizine 10 MG tablet    zyrTEC     Take 1-2 tablets (10-20 mg) by mouth daily        EPINEPHrine 0.3 MG/0.3ML injection 2-pack    AUVI-Q    2 mL    Inject 0.3 mLs (0.3 mg) into the muscle as needed for anaphylaxis    Need for desensitization to allergens       fluticasone 50 MCG/ACT spray    FLONASE    3 Bottle    Spray 1-2 sprays into both nostrils daily    Chronic seasonal allergic rhinitis due to pollen       omeprazole 20 MG CR capsule    priLOSEC    90 capsule    Take 1 capsule (20 mg) by mouth daily    Abdominal pain, epigastric

## 2018-06-29 ENCOUNTER — OFFICE VISIT (OUTPATIENT)
Dept: FAMILY MEDICINE | Facility: CLINIC | Age: 38
End: 2018-06-29
Payer: COMMERCIAL

## 2018-06-29 VITALS
DIASTOLIC BLOOD PRESSURE: 70 MMHG | OXYGEN SATURATION: 100 % | SYSTOLIC BLOOD PRESSURE: 112 MMHG | BODY MASS INDEX: 27.28 KG/M2 | HEART RATE: 81 BPM | TEMPERATURE: 98.1 F | WEIGHT: 144.4 LBS

## 2018-06-29 DIAGNOSIS — N89.8 VAGINAL DISCHARGE: ICD-10-CM

## 2018-06-29 DIAGNOSIS — Z32.00 ENCOUNTER FOR PREGNANCY TEST, RESULT UNKNOWN: ICD-10-CM

## 2018-06-29 DIAGNOSIS — N64.4 BREAST PAIN: Primary | ICD-10-CM

## 2018-06-29 DIAGNOSIS — B37.31 YEAST INFECTION OF THE VAGINA: ICD-10-CM

## 2018-06-29 DIAGNOSIS — Z98.82 HISTORY OF BREAST IMPLANT: ICD-10-CM

## 2018-06-29 LAB
ALBUMIN UR-MCNC: NEGATIVE MG/DL
APPEARANCE UR: CLEAR
BACTERIA #/AREA URNS HPF: ABNORMAL /HPF
BETA HCG QUAL IFA URINE: NEGATIVE
BILIRUB UR QL STRIP: NEGATIVE
COLOR UR AUTO: YELLOW
GLUCOSE UR STRIP-MCNC: NEGATIVE MG/DL
HGB UR QL STRIP: ABNORMAL
KETONES UR STRIP-MCNC: NEGATIVE MG/DL
LEUKOCYTE ESTERASE UR QL STRIP: NEGATIVE
NITRATE UR QL: NEGATIVE
NON-SQ EPI CELLS #/AREA URNS LPF: ABNORMAL /LPF
PH UR STRIP: 6 PH (ref 5–7)
RBC #/AREA URNS AUTO: ABNORMAL /HPF
SOURCE: ABNORMAL
SP GR UR STRIP: 1.02 (ref 1–1.03)
SPECIMEN SOURCE: ABNORMAL
UROBILINOGEN UR STRIP-ACNC: 1 EU/DL (ref 0.2–1)
WBC #/AREA URNS AUTO: ABNORMAL /HPF
WET PREP SPEC: ABNORMAL

## 2018-06-29 PROCEDURE — 99214 OFFICE O/P EST MOD 30 MIN: CPT | Performed by: PREVENTIVE MEDICINE

## 2018-06-29 PROCEDURE — 81001 URINALYSIS AUTO W/SCOPE: CPT | Performed by: PREVENTIVE MEDICINE

## 2018-06-29 PROCEDURE — 87210 SMEAR WET MOUNT SALINE/INK: CPT | Performed by: PREVENTIVE MEDICINE

## 2018-06-29 PROCEDURE — 84703 CHORIONIC GONADOTROPIN ASSAY: CPT | Performed by: PREVENTIVE MEDICINE

## 2018-06-29 RX ORDER — FLUCONAZOLE 150 MG/1
150 TABLET ORAL ONCE
Qty: 1 TABLET | Refills: 1 | Status: SHIPPED | OUTPATIENT
Start: 2018-06-29 | End: 2018-06-29

## 2018-06-29 NOTE — MR AVS SNAPSHOT
After Visit Summary   6/29/2018    oGgo Bourgeois    MRN: [id number]           Patient Information     Date Of Birth          1980        Visit Information        Provider Department      6/29/2018 3:20 PM Ele Deshpande MD [facility]        Today's Diagnoses     Breast pain    -  1    Vaginal discharge        Encounter for pregnancy test, result unknown        History of breast implant           Follow-ups after your visit        Your next 10 appointments already scheduled     Jul 10, 2018 12:30 PM CDT   Nurse Only with FZ ALLERGY SHOTS   [facility] ([facility])    [address] [address]   [phone]            Jul 24, 2018  3:20 PM CDT   Nurse Only with FZ ALLERGY SHOTS   [facility] ([facility])    [address] [address]   [phone]            Jul 31, 2018 12:40 PM CDT   Nurse Only with FZ ALLERGY SHOTS   [facility] ([facility])    [address] [address]   [phone]              Future tests that were ordered for you today     Open Future Orders        Priority Expected Expires Ordered    MR Breast Bilateral w/o & w Contrast Routine  6/29/2019 6/29/2018            Who to contact     If you have questions or need follow up information about today's clinic visit or your schedule please contact [facility] directly at [phone].  Normal or non-critical lab and imaging results will be communicated to you by MyChart, letter or phone within 4 business days after the clinic has received the results. If you do not hear from us within 7 days, please contact the clinic through [employer] or phone. If you have a critical or abnormal lab result, we will notify you by phone as soon as possible.  Submit refill requests through "[employer] or call your pharmacy and they will forward the refill request to us. Please  allow 3 business days for your refill to be completed.          Additional Information About Your Visit        MyChart Information     [employer] gives you secure access to your electronic health record. If you see a primary care provider, you can also send messages to your care team and make appointments. If you have questions, please call your primary care clinic.  If you do not have a primary care provider, please call [phone] and they will assist you.        Care EveryWhere ID     This is your Care EveryWhere ID. This could be used by other organizations to access your [facility] medical records  [id number]        Your Vitals Were     Pulse Temperature Pulse Oximetry BMI (Body Mass Index)          81 98.1  F (36.7  C) (Oral) 100% 27.28 kg/m2         Blood Pressure from Last 3 Encounters:   06/29/18 112/70   06/12/18 114/80   05/08/18 100/68    Weight from Last 3 Encounters:   06/29/18 144 lb 6.4 oz (65.5 kg)   06/12/18 143 lb 6.4 oz (65 kg)   05/08/18 143 lb 6.4 oz (65 kg)              We Performed the Following     Beta HCG Qual, Urine - [facility] [place] ([id number])     UA reflex to Microscopic and Culture     Wet prep        Primary Care Provider Office Phone # Fax #    Paul Weber PA-C [phone]       [address]  [place] [address]        Equal Access to Services     [facility] : Hadii aad ku hadasho Soomaali, waaxda luqadaha, qaybta kaalmada adeegyada, rema velazco haychente negron . So [facility] [phone].    ATENCIÓN: Si habla español, tiene a arteaga disposición servicios gratuitos de asistencia lingüística. Llame al [phone].    We comply with applicable federal civil rights laws and [place] laws. We do not discriminate on the basis of race, color, national origin, age, disability, sex, sexual orientation, or gender identity.            Thank you!     Thank you for choosing [facility]  for your care. Our goal is always to provide you with  excellent care. Hearing back from our patients is one way we can continue to improve our services. Please take a few minutes to complete the written survey that you may receive in the mail after your visit with us. Thank you!             Your Updated Medication List - Protect others around you: Learn how to safely use, store and throw away your medicines at www.disposemymeds.org.          This list is accurate as of 6/29/18  4:22 PM.  Always use your most recent med list.                   Brand Name Dispense Instructions for use Diagnosis    ALLERGEN IMMUNOTHERAPY PRESCRIPTION     5 mL    Name of Mix: Mix #1  Mold Alternaria Tenuis 1:10 w/v, HS  0.5 ml Hormodendrum Cladosporioides 1:10 w/v, HS 0.5 ml Diluent: HSA qs to 5ml    Chronic seasonal allergic rhinitis due to pollen, Allergic rhinitis due to mold       ALLERGEN IMMUNOTHERAPY PRESCRIPTION     5 mL    Name of Mix: Mix #2  Tree  Julio Cesar, White 1:20 w/v, HS  0.5 ml Birch Mix PRW 1:20 w/v, HS  0.5 ml Boxelder-Maple Mix BHR (Boxelder Hard Red) 1:20 w/v, HS  0.5 ml [place], Common 1:20 w/v, HS  0.5 ml Elm, American 1:20 w/v, HS  0.5 ml Hackberry 1:20 w/v, HS 0.5 ml Hickory, Shagbark 1:20 w/v, HS  0.5 ml [place] Mix RW 1:20 w/v, HS 0.5 ml [place], Black 1:20 w/v, HS 0.5 ml Diluent: HSA qs to 5ml    Chronic seasonal allergic rhinitis due to pollen, Allergic rhinitis due to mold       ALLERGEN IMMUNOTHERAPY PRESCRIPTION     5 mL    Name of Mix: Mix #3  Weeds Nettle 1:20 w/v, HS 0.5 ml Pigweed, Careless 1:20 w/v, HS 0.5 ml Plantain, English 1:20 w/v, HS 0.5 ml Diluent: HSA qs to 5ml    Chronic seasonal allergic rhinitis due to pollen, Allergic rhinitis due to mold       cetirizine 10 MG tablet    zyrTEC     Take 1-2 tablets (10-20 mg) by mouth daily        EPINEPHrine 0.3 MG/0.3ML injection 2-pack    AUVI-Q    2 mL    Inject 0.3 mLs (0.3 mg) into the muscle as needed for anaphylaxis    Need for desensitization to allergens       fluticasone 50 MCG/ACT spray    FLONASE     3 Bottle    Spray 1-2 sprays into both nostrils daily    Chronic seasonal allergic rhinitis due to pollen       omeprazole 20 MG CR capsule    priLOSEC    90 capsule    Take 1 capsule (20 mg) by mouth daily    Abdominal pain, epigastric

## 2018-06-29 NOTE — PROGRESS NOTES
"  SUBJECTIVE:   Gogo Bourgeois is a 38 year old female who presents to clinic today for the following health issues:      Breast Pain      Duration: Oct-Nov 2017; worse x 3 days    Description (location/character/radiation): right breast pain, lumpy breast, pain in left breast today    Intensity:  severe    Accompanying signs and symptoms: feels swollen    History (similar episodes/previous evaluation): imaging in December 2017, US and Diagnostic mammogram were normal; Plastics consult    Precipitating or alleviating factors: None    Therapies tried and outcome: None     Has been talking to plastic surgery in [facility] where implants were done.  Concerns about pregnancy as both breasts are feeling sore like when she would be pregnant, she has had a tubal ligation. Mainly pain in the right breast but now also starting on the left side.   Feels symptoms are getting worse  Pain even with bending and radiates down the arms  Had some discomfort in the left breast too  LMP 6/2/18, due for period, has not had breast tenderness in the past   No nipple discharge  Implant feels more bumpy  Feels a bump in the nipple     Saw Plastic Surgery in 1/18, note reviewed, was diagnosed with status post bilateral mastopexy augmentation with non specific right sided breast implant pain.   One option discussed was to get MRI to ensure no obvious rupture of the implants and other option was close follow up. At that time, had opted for follow up.  Plans to do any corrective surgery in [facility] since \"would still be in warranty\".     Vaginal Symptoms      Duration: 4 days    Description  vaginal discharge - green, itching and burning    Intensity:  severe    Accompanying signs and symptoms (fever/dysuria/abdominal or back pain): None    History  Sexually active: yes, single partner, contraception not required  Possibility of pregnancy: Don't Know  Recent antibiotic use: no     Precipitating or alleviating factors: None    Therapies tried " and outcome: Monistat   Outcome: slightly effective      Problem list and histories reviewed & adjusted, as indicated.  Additional history: as documented    Patient Active Problem List   Diagnosis     S/P LEEP of cervix     Traction alopecia     S/P gastric bypass     Dyshidrotic eczema     Chronic seasonal allergic rhinitis due to pollen     Allergic rhinitis due to mold     Allergic conjunctivitis, bilateral     Pollen-food allergy syndrome     Breast pain     Past Surgical History:   Procedure Laterality Date     C MAMMOGRAM, W/BILAT. IMPLANTS  2012    in [place]     COSMETIC ABDOMINOPLASTY  2012    plus plast of arms and inner thighs     HC REMOVAL GALLBLADDER  12/2001     LAPAROSCOPIC BYPASS GASTRIC  8/23/11    Sleeve done in [facility] [place], CERVICAL  2002     TUBAL LIGATION  2014       Social History   Substance Use Topics     Smoking status: Never Smoker     Smokeless tobacco: Never Used      Comment: Nonsmoking household     Alcohol use No     Family History   Problem Relation Age of Onset     Hypertension Maternal Grandmother      Respiratory Paternal Grandmother      smoker     Alcohol/Drug Paternal Grandfather      alcohlism, cirrhosis of liver         Current Outpatient Prescriptions   Medication Sig Dispense Refill     cetirizine (ZYRTEC) 10 MG tablet Take 1-2 tablets (10-20 mg) by mouth daily       fluconazole (DIFLUCAN) 150 MG tablet Take 1 tablet (150 mg) by mouth once for 1 dose 1 tablet 1     omeprazole (PRILOSEC) 20 MG CR capsule Take 1 capsule (20 mg) by mouth daily 90 capsule 3     EPINEPHrine (AUVI-Q) 0.3 MG/0.3ML injection Inject 0.3 mLs (0.3 mg) into the muscle as needed for anaphylaxis (Patient not taking: Reported on 6/29/2018) 2 mL 2     fluticasone (FLONASE) 50 MCG/ACT spray Spray 1-2 sprays into both nostrils daily (Patient not taking: Reported on 6/29/2018) 3 Bottle 3     ORDER FOR ALLERGEN IMMUNOTHERAPY Name of Mix: Mix #1  Mold  Alternaria Tenuis 1:10 w/v, HS  0.5  ml  Hormodendrum Cladosporioides 1:10 w/v, HS 0.5 ml  Diluent: HSA qs to 5ml 5 mL PRN     ORDER FOR ALLERGEN IMMUNOTHERAPY Name of Mix: Mix #2  Tree   Julio Cesar, White 1:20 w/v, HS  0.5 ml  Birch Mix PRW 1:20 w/v, HS  0.5 ml  Boxelder-Maple Mix BHR (Boxelder Hard Red) 1:20 w/v, HS  0.5 ml  [place], Common 1:20 w/v, HS  0.5 ml  Elm, American 1:20 w/v, HS  0.5 ml  Hackberry 1:20 w/v, HS 0.5 ml  Hickory, Shagbark 1:20 w/v, HS  0.5 ml  [place] Mix RW 1:20 w/v, HS 0.5 ml  [place], Black 1:20 w/v, HS 0.5 ml  Diluent: HSA qs to 5ml 5 mL PRN     ORDER FOR ALLERGEN IMMUNOTHERAPY Name of Mix: Mix #3  Weeds  Nettle 1:20 w/v, HS 0.5 ml  Pigweed, Careless 1:20 w/v, HS 0.5 ml  Plantain, English 1:20 w/v, HS 0.5 ml  Diluent: HSA qs to 5ml 5 mL PRN     Allergies   Allergen Reactions     Nkda [No Known Drug Allergies]      Seasonal Allergies      BP Readings from Last 3 Encounters:   06/29/18 112/70   06/12/18 114/80   05/08/18 100/68    Wt Readings from Last 3 Encounters:   06/29/18 144 lb 6.4 oz (65.5 kg)   06/12/18 143 lb 6.4 oz (65 kg)   05/08/18 143 lb 6.4 oz (65 kg)                  Labs reviewed in EPIC    Reviewed and updated as needed this visit by clinical staff       Reviewed and updated as needed this visit by Provider         ROS:  Constitutional, neuro, ENT, endocrine, pulmonary, cardiac, gastrointestinal, genitourinary, musculoskeletal, integument and psychiatric systems are negative, except as otherwise noted.    OBJECTIVE:                                                    /70 (BP Location: Left arm, Patient Position: Chair, Cuff Size: Adult Regular)  Pulse 81  Temp 98.1  F (36.7  C) (Oral)  Wt 144 lb 6.4 oz (65.5 kg)  SpO2 100%  BMI 27.28 kg/m2  Body mass index is 27.28 kg/(m^2).  GENERAL APPEARANCE: healthy, alert and no distress  EYES: Eyes grossly normal to inspection and conjunctivae and sclerae normal  NECK: trachea midline and normal to palpation  RESP: lungs clear to auscultation - no rales, rhonchi  or wheezes  BREAST: normal without masses, tenderness or nipple discharge and no palpable axillary masses or adenopathy, no gross deformities, no skin changes, slight area of nodularity along the outer aspect of the breast on the right side and tiny nodularity on bilateral areolas.   ABDOMEN: soft, non-tender  MS: extremities normal- no gross deformities noted  SKIN: no suspicious lesions or rashes  NEURO: Normal strength and tone, mentation intact and speech normal  PSYCH: mentation appears normal    Diagnostic test results:  Diagnostic Test Results:  Results for orders placed or performed in visit on 06/29/18 (from the past 24 hour(s))   UA reflex to Microscopic and Culture   Result Value Ref Range    Color Urine Yellow     Appearance Urine Clear     Glucose Urine Negative NEG^Negative mg/dL    Bilirubin Urine Negative NEG^Negative    Ketones Urine Negative NEG^Negative mg/dL    Specific Gravity Urine 1.020 1.003 - 1.035    Blood Urine Moderate (A) NEG^Negative    pH Urine 6.0 5.0 - 7.0 pH    Protein Albumin Urine Negative NEG^Negative mg/dL    Urobilinogen Urine 1.0 0.2 - 1.0 EU/dL    Nitrite Urine Negative NEG^Negative    Leukocyte Esterase Urine Negative NEG^Negative    Source Midstream Urine    Wet prep   Result Value Ref Range    Specimen Description Vagina     Wet Prep Yeast seen (A)     Wet Prep No Trichomonas seen     Wet Prep No clue cells seen    Beta HCG Qual, Urine - [facility] [place] ([id number])   Result Value Ref Range    Beta HCG Qual IFA Urine Negative NEG^Negative      Urine Microscopic   Result Value Ref Range    WBC Urine 0 - 5 OTO5^0 - 5 /HPF    RBC Urine 5-10 (A) OTO2^O - 2 /HPF    Squamous Epithelial /LPF Urine Few FEW^Few /LPF    Bacteria Urine Few (A) NEG^Negative /HPF        ASSESSMENT/PLAN:                                                    1. Breast pain  -Mainly in the right breast but starting to feel pain in the left side too  -US and Mammogram negative   -Has been seen by Plastic  Surgery  - MR Breast Bilateral w/o & w Contrast; Future  - Urine Microscopic    2. Vaginal discharge  -UA with no infections, blood noted, has had this going back several years   - UA reflex to Microscopic and Culture  - Wet prep  - fluconazole (DIFLUCAN) 150 MG tablet; Take 1 tablet (150 mg) by mouth once for 1 dose  Dispense: 1 tablet; Refill: 1    3. Encounter for pregnancy test, result unknown  -Negative for pregnancy   - Beta HCG Qual, Urine - [facility] [place] ([id number])    4. History of breast implant  - MR Breast Bilateral w/o & w Contrast; Future    5. Yeast infection of the vagina  - fluconazole (DIFLUCAN) 150 MG tablet; Take 1 tablet (150 mg) by mouth once for 1 dose  Dispense: 1 tablet; Refill: 1      Follow up with Provider - as needed     Ele Deshpande MD MPH    [facility]

## 2018-06-29 NOTE — PROGRESS NOTES
Gogo,     Urine sample did not show any infections.  Yeast infection was seen, I have sent medication for this to your pharmacy.     Please do not hesitate to call us at [phone] if you have any questions or concerns.    Thank you,    Ele Deshpande MD MPH

## 2018-07-02 ENCOUNTER — RADIANT APPOINTMENT (OUTPATIENT)
Dept: MRI IMAGING | Facility: CLINIC | Age: 38
End: 2018-07-02
Payer: COMMERCIAL

## 2018-07-02 DIAGNOSIS — N64.4 BREAST PAIN: ICD-10-CM

## 2018-07-02 DIAGNOSIS — Z98.82 HISTORY OF BREAST IMPLANT: ICD-10-CM

## 2018-07-02 PROCEDURE — 0159T MR BREAST BILATERAL W/O & W CONTRAST: CPT

## 2018-07-02 PROCEDURE — A9585 GADOBUTROL INJECTION: HCPCS | Performed by: PREVENTIVE MEDICINE

## 2018-07-02 PROCEDURE — 77059 MR BREAST BILATERAL W/O & W CONTRAST: CPT

## 2018-07-02 RX ORDER — GADOBUTROL 604.72 MG/ML
7.5 INJECTION INTRAVENOUS ONCE
Status: COMPLETED | OUTPATIENT
Start: 2018-07-02 | End: 2018-07-02

## 2018-07-02 RX ADMIN — GADOBUTROL 7.5 ML: 604.72 INJECTION INTRAVENOUS at 12:18

## 2018-07-03 ENCOUNTER — TELEPHONE (OUTPATIENT)
Dept: GENERAL RADIOLOGY | Facility: CLINIC | Age: 38
End: 2018-07-03

## 2018-07-03 NOTE — TELEPHONE ENCOUNTER
Called pt to schedule a right breast ultrasound and fluid aspiration. No answer. Left message with contact information for pt to return call.

## 2018-07-06 ENCOUNTER — RADIANT APPOINTMENT (OUTPATIENT)
Dept: ULTRASOUND IMAGING | Facility: CLINIC | Age: 38
End: 2018-07-06
Payer: COMMERCIAL

## 2018-07-06 DIAGNOSIS — R92.8 ABNORMAL MRI, BREAST: ICD-10-CM

## 2018-07-06 LAB
GRAM STN SPEC: NORMAL
GRAM STN SPEC: NORMAL
SPECIMEN SOURCE: NORMAL

## 2018-07-06 PROCEDURE — 88305 TISSUE EXAM BY PATHOLOGIST: CPT | Performed by: PREVENTIVE MEDICINE

## 2018-07-06 PROCEDURE — 88341 IMHCHEM/IMCYTCHM EA ADD ANTB: CPT | Performed by: PREVENTIVE MEDICINE

## 2018-07-06 PROCEDURE — 87070 CULTURE OTHR SPECIMN AEROBIC: CPT | Performed by: PREVENTIVE MEDICINE

## 2018-07-06 PROCEDURE — 19000 PUNCTURE ASPIR CYST BREAST: CPT

## 2018-07-06 PROCEDURE — 88112 CYTOPATH CELL ENHANCE TECH: CPT | Mod: 59 | Performed by: PREVENTIVE MEDICINE

## 2018-07-06 PROCEDURE — 88342 IMHCHEM/IMCYTCHM 1ST ANTB: CPT | Mod: 59 | Performed by: PREVENTIVE MEDICINE

## 2018-07-06 PROCEDURE — 88185 FLOWCYTOMETRY/TC ADD-ON: CPT | Performed by: PREVENTIVE MEDICINE

## 2018-07-06 PROCEDURE — 88184 FLOWCYTOMETRY/ TC 1 MARKER: CPT | Performed by: PREVENTIVE MEDICINE

## 2018-07-06 PROCEDURE — 87205 SMEAR GRAM STAIN: CPT | Performed by: PREVENTIVE MEDICINE

## 2018-07-06 PROCEDURE — 76642 ULTRASOUND BREAST LIMITED: CPT | Mod: RT

## 2018-07-06 PROCEDURE — 76942 ECHO GUIDE FOR BIOPSY: CPT | Mod: 59

## 2018-07-10 ENCOUNTER — ALLIED HEALTH/NURSE VISIT (OUTPATIENT)
Dept: ALLERGY | Facility: CLINIC | Age: 38
End: 2018-07-10
Payer: COMMERCIAL

## 2018-07-10 DIAGNOSIS — J30.9 ALLERGIC RHINITIS: Primary | ICD-10-CM

## 2018-07-10 LAB — COPATH REPORT: NORMAL

## 2018-07-10 PROCEDURE — 95117 IMMUNOTHERAPY INJECTIONS: CPT

## 2018-07-10 PROCEDURE — 99207 ZZC DROP WITH A PROCEDURE: CPT

## 2018-07-10 NOTE — PROGRESS NOTES
Patient presented after waiting 30 minutes with normal reaction to allergy injections. Discharged from clinic.    Lori Nguyen RN ............   7/10/2018...1:19 PM

## 2018-07-10 NOTE — MR AVS SNAPSHOT
After Visit Summary   7/10/2018    Gogo Bourgeois    MRN: [id number]           Patient Information     Date Of Birth          1980        Visit Information        Provider Department      7/10/2018 12:30 PM FZ ALLERGY SHOTS [facility]        Today's Diagnoses     Allergic rhinitis    -  1       Follow-ups after your visit        Your next 10 appointments already scheduled     Jul 24, 2018  3:20 PM CDT   Nurse Only with FZ ALLERGY SHOTS   [facility]    [address]  [place] [address] [phone]            Jul 31, 2018 12:40 PM CDT   Nurse Only with FZ ALLERGY SHOTS   [facility]    [address]  [place] [address] [phone]              Who to contact     If you have questions or need follow up information about today's clinic visit or your schedule please contact [facility] directly at [phone].  Normal or non-critical lab and imaging results will be communicated to you by Jooxhart, letter or phone within 4 business days after the clinic has received the results. If you do not hear from us within 7 days, please contact the clinic through [employer] or phone. If you have a critical or abnormal lab result, we will notify you by phone as soon as possible.  Submit refill requests through Kiyon or call your pharmacy and they will forward the refill request to us. Please allow 3 business days for your refill to be completed.          Additional Information About Your Visit        Jooxhart Information     Kiyon gives you secure access to your electronic health record. If you see a primary care provider, you can also send messages to your care team and make appointments. If you have questions, please call your primary care clinic.  If you do not have a primary care provider, please call [phone] and they will assist you.        Care EveryWhere ID     This is  your Care EveryWhere ID. This could be used by other organizations to access your [facility] medical records  [id number]         Blood Pressure from Last 3 Encounters:   06/29/18 112/70   06/12/18 114/80   05/08/18 100/68    Weight from Last 3 Encounters:   06/29/18 65.5 kg (144 lb 6.4 oz)   06/12/18 65 kg (143 lb 6.4 oz)   05/08/18 65 kg (143 lb 6.4 oz)              We Performed the Following     Allergy Shot: Two or more injections        Primary Care Provider Office Phone # Fax #    Paul Kimberlyn Weber PA-C [phone]       [address] [place] [address]        Equal Access to Services     [facility] : Hadii aad ku hadasho Soomaali, waaxda luqadaha, qaybta kaalmada adeegyada, waxay idiin hayaan micaela negron . So [facility] [phone].    ATENCIÓN: Si habla español, tiene a arteaga disposición servicios gratuitos de asistencia lingüística. [facility] [phone].    We comply with applicable federal civil rights laws and [place] laws. We do not discriminate on the basis of race, color, national origin, age, disability, sex, sexual orientation, or gender identity.            Thank you!     Thank you for choosing [facility]  for your care. Our goal is always to provide you with excellent care. Hearing back from our patients is one way we can continue to improve our services. Please take a few minutes to complete the written survey that you may receive in the mail after your visit with us. Thank you!             Your Updated Medication List - Protect others around you: Learn how to safely use, store and throw away your medicines at www.disposemymeds.org.          This list is accurate as of 7/10/18  1:19 PM.  Always use your most recent med list.                   Brand Name Dispense Instructions for use Diagnosis    ALLERGEN IMMUNOTHERAPY PRESCRIPTION     5 mL    Name of Mix: Mix #1  Mold Alternaria Tenuis 1:10 w/v, HS  0.5 ml Hormodendrum Cladosporioides 1:10 w/v, HS 0.5 ml  Diluent: HSA qs to 5ml    Chronic seasonal allergic rhinitis due to pollen, Allergic rhinitis due to mold       ALLERGEN IMMUNOTHERAPY PRESCRIPTION     5 mL    Name of Mix: Mix #2  Tree  Julio Cesar, White 1:20 w/v, HS  0.5 ml Birch Mix PRW 1:20 w/v, HS  0.5 ml Boxelder-Maple Mix BHR (Boxelder Hard Red) 1:20 w/v, HS  0.5 ml [place], Common 1:20 w/v, HS  0.5 ml Elm, American 1:20 w/v, HS  0.5 ml Hackberry 1:20 w/v, HS 0.5 ml Hickory, Shagbark 1:20 w/v, HS  0.5 ml [place] Mix RW 1:20 w/v, HS 0.5 ml [place], Black 1:20 w/v, HS 0.5 ml Diluent: HSA qs to 5ml    Chronic seasonal allergic rhinitis due to pollen, Allergic rhinitis due to mold       ALLERGEN IMMUNOTHERAPY PRESCRIPTION     5 mL    Name of Mix: Mix #3  Weeds Nettle 1:20 w/v, HS 0.5 ml Pigweed, Careless 1:20 w/v, HS 0.5 ml Plantain, English 1:20 w/v, HS 0.5 ml Diluent: HSA qs to 5ml    Chronic seasonal allergic rhinitis due to pollen, Allergic rhinitis due to mold       cetirizine 10 MG tablet    zyrTEC     Take 1-2 tablets (10-20 mg) by mouth daily        EPINEPHrine 0.3 MG/0.3ML injection 2-pack    AUVI-Q    2 mL    Inject 0.3 mLs (0.3 mg) into the muscle as needed for anaphylaxis    Need for desensitization to allergens       fluticasone 50 MCG/ACT spray    FLONASE    3 Bottle    Spray 1-2 sprays into both nostrils daily    Chronic seasonal allergic rhinitis due to pollen       omeprazole 20 MG CR capsule    priLOSEC    90 capsule    Take 1 capsule (20 mg) by mouth daily    Abdominal pain, epigastric

## 2018-07-11 ENCOUNTER — TELEPHONE (OUTPATIENT)
Dept: GENERAL RADIOLOGY | Facility: CLINIC | Age: 38
End: 2018-07-11

## 2018-07-11 LAB
BACTERIA SPEC CULT: NO GROWTH
COPATH REPORT: NORMAL
SPECIMEN SOURCE: NORMAL

## 2018-07-11 NOTE — TELEPHONE ENCOUNTER
Spoke to Gogo regarding the Negative results from the Aerobic Bacterial Culture collected from her right breast on 7/6/18. Informed her that we are awaiting results of the Leukemia/Lymphoma Evaluation (flow cytometry) and we will call her when the results are finalized. She verbalized understanding and all questions were addressed at this time.

## 2018-07-13 ENCOUNTER — TELEPHONE (OUTPATIENT)
Dept: GENERAL RADIOLOGY | Facility: CLINIC | Age: 38
End: 2018-07-13

## 2018-07-13 NOTE — PROGRESS NOTES
Gogo,     Based on the findings of the breast fluid, a repeat needle aspiration with additional sampling was recommended. The breast clinic will be working with you on this.     Please do not hesitate to call us at [phone] if you have any questions or concerns.    Thank you,    Ele Deshpande MD MPH

## 2018-07-13 NOTE — TELEPHONE ENCOUNTER
Spoke with patient regarding results of breast aspiration. Discussed that these results are essentially inconclusive and further testing is necessary. Pt is scheduled for a repeat aspiration on 7/23/18. She verbalized understanding and all questions answered to her satisfaction.

## 2018-07-24 ENCOUNTER — ALLIED HEALTH/NURSE VISIT (OUTPATIENT)
Dept: ALLERGY | Facility: CLINIC | Age: 38
End: 2018-07-24
Payer: COMMERCIAL

## 2018-07-24 ENCOUNTER — RADIANT APPOINTMENT (OUTPATIENT)
Dept: ULTRASOUND IMAGING | Facility: CLINIC | Age: 38
End: 2018-07-24
Attending: FAMILY MEDICINE
Payer: COMMERCIAL

## 2018-07-24 ENCOUNTER — RADIANT APPOINTMENT (OUTPATIENT)
Dept: MAMMOGRAPHY | Facility: CLINIC | Age: 38
End: 2018-07-24
Attending: FAMILY MEDICINE
Payer: COMMERCIAL

## 2018-07-24 ENCOUNTER — TELEPHONE (OUTPATIENT)
Dept: UROLOGY | Facility: CLINIC | Age: 38
End: 2018-07-24

## 2018-07-24 DIAGNOSIS — T85.848A PAIN FROM BREAST IMPLANT, INITIAL ENCOUNTER: ICD-10-CM

## 2018-07-24 DIAGNOSIS — J30.9 ALLERGIC RHINITIS: Primary | ICD-10-CM

## 2018-07-24 DIAGNOSIS — N63.0 LUMP OR MASS IN BREAST: ICD-10-CM

## 2018-07-24 DIAGNOSIS — Z51.6 NEED FOR DESENSITIZATION TO ALLERGENS: ICD-10-CM

## 2018-07-24 DIAGNOSIS — T85.848A PAIN FROM BREAST IMPLANT, INITIAL ENCOUNTER: Primary | ICD-10-CM

## 2018-07-24 DIAGNOSIS — N39.0 ACUTE UTI: ICD-10-CM

## 2018-07-24 PROCEDURE — 19083 BX BREAST 1ST LESION US IMAG: CPT | Mod: RT | Performed by: RADIOLOGY

## 2018-07-24 PROCEDURE — 95117 IMMUNOTHERAPY INJECTIONS: CPT

## 2018-07-24 PROCEDURE — 88305 TISSUE EXAM BY PATHOLOGIST: CPT | Performed by: RADIOLOGY

## 2018-07-24 PROCEDURE — 76642 ULTRASOUND BREAST LIMITED: CPT | Mod: 50 | Performed by: RADIOLOGY

## 2018-07-24 PROCEDURE — 99207 ZZC DROP WITH A PROCEDURE: CPT

## 2018-07-24 RX ORDER — EPINEPHRINE 0.3 MG/.3ML
0.3 INJECTION SUBCUTANEOUS PRN
Qty: 2 ML | Refills: 2 | Status: SHIPPED | OUTPATIENT
Start: 2018-07-24 | End: 2019-07-09

## 2018-07-24 RX ORDER — NITROFURANTOIN MACROCRYSTALS 50 MG/1
50 CAPSULE ORAL DAILY PRN
Qty: 28 CAPSULE | Refills: 0 | Status: SHIPPED | OUTPATIENT
Start: 2018-07-24 | End: 2018-09-14

## 2018-07-24 RX ADMIN — Medication 1 MEQ: at 11:21

## 2018-07-24 NOTE — PROGRESS NOTES
Patient presented after waiting 30 minutes with normal reaction to allergy injections. Discharged from clinic.    Lori Nguyen RN ............   7/24/2018...2:09 PM

## 2018-07-24 NOTE — TELEPHONE ENCOUNTER
Patient is looking for a refill of macrodantin 50mg- take one capsule by mouth every day as needed-take one capsule by mouth before or after sex. Not active in patient's chart.     Thank you,  Surekha Ruffin, PharmD  [facility]  [phone]

## 2018-07-24 NOTE — TELEPHONE ENCOUNTER
LOV 3/10/17  Pt requesting refill of Macrodantin  Scheduled a visit for 9/4/18 when she returns from [place]  One 30 day dary refill sent to pharmacy    Casper Marks RN....7/24/2018 2:45 PM

## 2018-07-24 NOTE — MR AVS SNAPSHOT
After Visit Summary   7/24/2018    Gogo Bourgeois    MRN: [id number]           Patient Information     Date Of Birth          1980        Visit Information        Provider Department      7/24/2018 1:40 PM FZ ALLERGY SHOTS [facility]        Today's Diagnoses     Allergic rhinitis    -  1    Need for desensitization to allergens           Follow-ups after your visit        Your next 10 appointments already scheduled     Jul 31, 2018  3:10 PM CDT   Nurse Only with FZ ALLERGY SHOTS   [facility]    [address] [address] [phone]              Who to contact     If you have questions or need follow up information about today's clinic visit or your schedule please contact [facility] directly at [phone].  Normal or non-critical lab and imaging results will be communicated to you by [employer], letter or phone within 4 business days after the clinic has received the results. If you do not hear from us within 7 days, please contact the clinic through [employer] or phone. If you have a critical or abnormal lab result, we will notify you by phone as soon as possible.  Submit refill requests through [employer] or call your pharmacy and they will forward the refill request to us. Please allow 3 business days for your refill to be completed.          Additional Information About Your Visit        MyChart Information     [employer] gives you secure access to your electronic health record. If you see a primary care provider, you can also send messages to your care team and make appointments. If you have questions, please call your primary care clinic.  If you do not have a primary care provider, please call [phone] and they will assist you.        Care EveryWhere ID     This is your Care EveryWhere ID. This could be used by other organizations to access your [facility] medical records  [id number]         Blood Pressure  from Last 3 Encounters:   06/29/18 112/70   06/12/18 114/80   05/08/18 100/68    Weight from Last 3 Encounters:   06/29/18 65.5 kg (144 lb 6.4 oz)   06/12/18 65 kg (143 lb 6.4 oz)   05/08/18 65 kg (143 lb 6.4 oz)              We Performed the Following     Allergy Shot: Two or more injections          Where to get your medicines      These medications were sent to [facility] [address]  [address] [address]     Phone:  [phone]     EPINEPHrine 0.3 MG/0.3ML injection 2-pack          Primary Care Provider Office Phone # Fax #    Paul Weber PA-C [phone]       [address] [place] [place] [address]        Equal Access to Services     [facility]: Hadii aad ku hadasho Soomaali, waaxda luqadaha, qaybta kaalmada adeegyada, waxay janisin haychente negron . So [facility] [phone].    ATENCIÓN: Si habla español, tiene a arteaga disposición servicios gratuitos de asistencia lingüística. [facility] al [phone].    We comply with applicable federal civil rights laws and [place] laws. We do not discriminate on the basis of race, color, national origin, age, disability, sex, sexual orientation, or gender identity.            Thank you!     Thank you for choosing [facility]  for your care. Our goal is always to provide you with excellent care. Hearing back from our patients is one way we can continue to improve our services. Please take a few minutes to complete the written survey that you may receive in the mail after your visit with us. Thank you!             Your Updated Medication List - Protect others around you: Learn how to safely use, store and throw away your medicines at www.disposemymeds.org.          This list is accurate as of 7/24/18  2:10 PM.  Always use your most recent med list.                   Brand Name Dispense Instructions for use Diagnosis    ALLERGEN IMMUNOTHERAPY PRESCRIPTION     5 mL    Name of  Mix: Mix #1  Mold Alternaria Tenuis 1:10 w/v, HS  0.5 ml Hormodendrum Cladosporioides 1:10 w/v, HS 0.5 ml Diluent: HSA qs to 5ml    Chronic seasonal allergic rhinitis due to pollen, Allergic rhinitis due to mold       ALLERGEN IMMUNOTHERAPY PRESCRIPTION     5 mL    Name of Mix: Mix #2  Tree  Julio Cesar, White 1:20 w/v, HS  0.5 ml Birch Mix PRW 1:20 w/v, HS  0.5 ml Boxelder-Maple Mix BHR (Boxelder Hard Red) 1:20 w/v, HS  0.5 ml [place], Common 1:20 w/v, HS  0.5 ml Elm, American 1:20 w/v, HS  0.5 ml Hackberry 1:20 w/v, HS 0.5 ml Hickory, Shagbark 1:20 w/v, HS  0.5 ml [place] Mix RW 1:20 w/v, HS 0.5 ml [place], Black 1:20 w/v, HS 0.5 ml Diluent: HSA qs to 5ml    Chronic seasonal allergic rhinitis due to pollen, Allergic rhinitis due to mold       ALLERGEN IMMUNOTHERAPY PRESCRIPTION     5 mL    Name of Mix: Mix #3  Weeds Nettle 1:20 w/v, HS 0.5 ml Pigweed, Careless 1:20 w/v, HS 0.5 ml Plantain, English 1:20 w/v, HS 0.5 ml Diluent: HSA qs to 5ml    Chronic seasonal allergic rhinitis due to pollen, Allergic rhinitis due to mold       cetirizine 10 MG tablet    zyrTEC     Take 1-2 tablets (10-20 mg) by mouth daily        EPINEPHrine 0.3 MG/0.3ML injection 2-pack    AUVI-Q    2 mL    Inject 0.3 mLs (0.3 mg) into the muscle as needed for anaphylaxis    Need for desensitization to allergens       fluticasone 50 MCG/ACT spray    FLONASE    3 Bottle    Spray 1-2 sprays into both nostrils daily    Chronic seasonal allergic rhinitis due to pollen       omeprazole 20 MG CR capsule    priLOSEC    90 capsule    Take 1 capsule (20 mg) by mouth daily    Abdominal pain, epigastric

## 2018-07-26 LAB — COPATH REPORT: NORMAL

## 2018-07-27 ENCOUNTER — TELEPHONE (OUTPATIENT)
Dept: GENERAL RADIOLOGY | Facility: CLINIC | Age: 38
End: 2018-07-27

## 2018-07-27 NOTE — TELEPHONE ENCOUNTER
Spoke to Gogo regarding the benign finding of Pseudoangiomatous Stromal Hyperplasia (PASH) found during her breast biopsy done earlier this week.  We discussed the Radiologist's recommendation of clinical follow up for breast pain/mass and if she becomes symptomatic again.  Per the Breast Radiologist's recommendation of breast MRI in 6 months may be considered if symptoms may arise again.  Gogo verbalized understanding and all questions and concerns were answered at this time.

## 2018-07-31 ENCOUNTER — ALLIED HEALTH/NURSE VISIT (OUTPATIENT)
Dept: ALLERGY | Facility: CLINIC | Age: 38
End: 2018-07-31
Payer: COMMERCIAL

## 2018-07-31 DIAGNOSIS — J30.9 ALLERGIC RHINITIS: Primary | ICD-10-CM

## 2018-07-31 PROCEDURE — 99207 ZZC DROP WITH A PROCEDURE: CPT

## 2018-07-31 PROCEDURE — 95117 IMMUNOTHERAPY INJECTIONS: CPT

## 2018-07-31 NOTE — PROGRESS NOTES
Patient presented after waiting 30 minutes with normal reaction to allergy injections. Discharged from clinic.    Lori Nguyen RN ............   7/31/2018...1:07 PM

## 2018-07-31 NOTE — MR AVS SNAPSHOT
After Visit Summary   7/31/2018    Gogo Bourgeois    MRN: [id number]           Patient Information     Date Of Birth          1980        Visit Information        Provider Department      7/31/2018 12:30 PM [facility]        Today's Diagnoses     Allergic rhinitis    -  1       Follow-ups after your visit        Your next 10 appointments already scheduled     Sep 04, 2018  2:45 PM CDT   Return Visit with Edson Ragland MD   [facility] [place] ([address]  [place] [address] [phone]              Who to contact     If you have questions or need follow up information about today's clinic visit or your schedule please contact [facility] [place] directly at [phone].  Normal or non-critical lab and imaging results will be communicated to you by MyChart, letter or phone within 4 business days after the clinic has received the results. If you do not hear from us within 7 days, please contact the clinic through MyChart or phone. If you have a critical or abnormal lab result, we will notify you by phone as soon as possible.  Submit refill requests through [employer] or call your pharmacy and they will forward the refill request to us. Please allow 3 business days for your refill to be completed.          Additional Information About Your Visit        MyChart Information     [employer] gives you secure access to your electronic health record. If you see a primary care provider, you can also send messages to your care team and make appointments. If you have questions, please call your primary care clinic.  If you do not have a primary care provider, please call [phone] and they will assist you.        Care EveryWhere ID     This is your Care EveryWhere ID. This could be used by other organizations to access your [facility] medical records  [id number]         Blood Pressure from Last 3 Encounters:   06/29/18  112/70   06/12/18 114/80   05/08/18 100/68    Weight from Last 3 Encounters:   06/29/18 65.5 kg (144 lb 6.4 oz)   06/12/18 65 kg (143 lb 6.4 oz)   05/08/18 65 kg (143 lb 6.4 oz)              We Performed the Following     Allergy Shot: Two or more injections        Primary Care Provider Office Phone # Fax #    Paul Kimberlyn Weber PA-C [phone]       [address] [place] [address]        Equal Access to Services     [facility]: Hadii aad ku hadasho Soomaali, waaxda luqadaha, qaybta kaalmada adeegyada, waxay idiin hayaan adeeg abel negron . So [facility] [phone].    ATENCIÓN: Si habla español, tiene a arteaga disposición servicios gratuitos de asistencia lingüística. [facility] [phone].    We comply with applicable federal civil rights laws and [place] laws. We do not discriminate on the basis of race, color, national origin, age, disability, sex, sexual orientation, or gender identity.            Thank you!     Thank you for choosing [facility]  for your care. Our goal is always to provide you with excellent care. Hearing back from our patients is one way we can continue to improve our services. Please take a few minutes to complete the written survey that you may receive in the mail after your visit with us. Thank you!             Your Updated Medication List - Protect others around you: Learn how to safely use, store and throw away your medicines at www.disposemymeds.org.          This list is accurate as of 7/31/18  1:07 PM.  Always use your most recent med list.                   Brand Name Dispense Instructions for use Diagnosis    ALLERGEN IMMUNOTHERAPY PRESCRIPTION     5 mL    Name of Mix: Mix #1  Mold Alternaria Tenuis 1:10 w/v, HS  0.5 ml Hormodendrum Cladosporioides 1:10 w/v, HS 0.5 ml Diluent: HSA qs to 5ml    Chronic seasonal allergic rhinitis due to pollen, Allergic rhinitis due to mold       ALLERGEN IMMUNOTHERAPY PRESCRIPTION     5 mL    Name of Mix: Mix #2   Tree  Julio Cesar, White 1:20 w/v, HS  0.5 ml Birch Mix PRW 1:20 w/v, HS  0.5 ml Boxelder-Maple Mix BHR (Boxelder Hard Red) 1:20 w/v, HS  0.5 ml [place], Common 1:20 w/v, HS  0.5 ml Elm, American 1:20 w/v, HS  0.5 ml Hackberry 1:20 w/v, HS 0.5 ml Hickory, Shagbark 1:20 w/v, HS  0.5 ml [place] Mix RW 1:20 w/v, HS 0.5 ml [place], Black 1:20 w/v, HS 0.5 ml Diluent: HSA qs to 5ml    Chronic seasonal allergic rhinitis due to pollen, Allergic rhinitis due to mold       ALLERGEN IMMUNOTHERAPY PRESCRIPTION     5 mL    Name of Mix: Mix #3  Weeds Nettle 1:20 w/v, HS 0.5 ml Pigweed, Careless 1:20 w/v, HS 0.5 ml Plantain, English 1:20 w/v, HS 0.5 ml Diluent: HSA qs to 5ml    Chronic seasonal allergic rhinitis due to pollen, Allergic rhinitis due to mold       cetirizine 10 MG tablet    zyrTEC     Take 1-2 tablets (10-20 mg) by mouth daily        EPINEPHrine 0.3 MG/0.3ML injection 2-pack    AUVI-Q    2 mL    Inject 0.3 mLs (0.3 mg) into the muscle as needed for anaphylaxis    Need for desensitization to allergens       fluticasone 50 MCG/ACT spray    FLONASE    3 Bottle    Spray 1-2 sprays into both nostrils daily    Chronic seasonal allergic rhinitis due to pollen       nitroFURantoin 50 MG capsule    MACRODANTIN    28 capsule    Take 1 capsule (50 mg) by mouth daily as needed    Acute UTI       omeprazole 20 MG CR capsule    priLOSEC    90 capsule    Take 1 capsule (20 mg) by mouth daily    Abdominal pain, epigastric

## 2018-08-30 ENCOUNTER — ALLIED HEALTH/NURSE VISIT (OUTPATIENT)
Dept: ALLERGY | Facility: CLINIC | Age: 38
End: 2018-08-30
Payer: COMMERCIAL

## 2018-08-30 DIAGNOSIS — J30.9 ALLERGIC RHINITIS: Primary | ICD-10-CM

## 2018-08-30 PROCEDURE — 95117 IMMUNOTHERAPY INJECTIONS: CPT

## 2018-08-30 PROCEDURE — 99207 ZZC DROP WITH A PROCEDURE: CPT

## 2018-08-30 NOTE — MR AVS SNAPSHOT
After Visit Summary   8/30/2018    Gogo Bourgeois    MRN: [id number]           Patient Information     Date Of Birth          1980        Visit Information        Provider Department      8/30/2018 11:20 AM FZ ALLERGY SHOTS [facility]        Today's Diagnoses     Allergic rhinitis    -  1       Follow-ups after your visit        Your next 10 appointments already scheduled     Sep 04, 2018  2:45 PM CDT   Return Visit with Edson Ragland MD   [facility] ([facility])    [address]  [place] [id number] [phone]            Sep 25, 2018 12:40 PM CDT   Nurse Only with FZ ALLERGY SHOTS   [facility] ([facility])    [address]  [place] [address] [phone]              Who to contact     If you have questions or need follow up information about today's clinic visit or your schedule please contact [facility] directly at [phone].  Normal or non-critical lab and imaging results will be communicated to you by Hireologyhart, letter or phone within 4 business days after the clinic has received the results. If you do not hear from us within 7 days, please contact the clinic through Creactivest or phone. If you have a critical or abnormal lab result, we will notify you by phone as soon as possible.  Submit refill requests through Suja Juice or call your pharmacy and they will forward the refill request to us. Please allow 3 business days for your refill to be completed.          Additional Information About Your Visit        Hireologyhart Information     Suja Juice gives you secure access to your electronic health record. If you see a primary care provider, you can also send messages to your care team and make appointments. If you have questions, please call your primary care clinic.  If you do not have a primary care provider, please call [phone] and they will assist you.        Care EveryWhere ID      This is your Care EveryWhere ID. This could be used by other organizations to access your [place] medical records  [id number]         Blood Pressure from Last 3 Encounters:   06/29/18 112/70   06/12/18 114/80   05/08/18 100/68    Weight from Last 3 Encounters:   06/29/18 65.5 kg (144 lb 6.4 oz)   06/12/18 65 kg (143 lb 6.4 oz)   05/08/18 65 kg (143 lb 6.4 oz)              We Performed the Following     Allergy Shot: Two or more injections        Primary Care Provider Office Phone # Fax #    Paul Kimberlyn Weber PA-C [phone]       [address] [place] [address]        Equal Access to Services     EMGHAN CARR : Hadii aad ku hadasho Soomaali, waaxda luqadaha, qaybta kaalmada adeegyada, waxay idiin haydaronn micaela ngeron . So [facility] [phone].    ATENCIÓN: Si habla español, tiene a arteaga disposición servicios gratuitos de asistencia lingüística. [facility] [phone].    We comply with applicable federal civil rights laws and [place] laws. We do not discriminate on the basis of race, color, national origin, age, disability, sex, sexual orientation, or gender identity.            Thank you!     Thank you for choosing [facility]  for your care. Our goal is always to provide you with excellent care. Hearing back from our patients is one way we can continue to improve our services. Please take a few minutes to complete the written survey that you may receive in the mail after your visit with us. Thank you!             Your Updated Medication List - Protect others around you: Learn how to safely use, store and throw away your medicines at www.disposemymeds.org.          This list is accurate as of 8/30/18 12:13 PM.  Always use your most recent med list.                   Brand Name Dispense Instructions for use Diagnosis    cetirizine 10 MG tablet    zyrTEC     Take 1-2 tablets (10-20 mg) by mouth daily        EPINEPHrine 0.3 MG/0.3ML injection 2-pack    AUVI-Q    2 mL    Inject  0.3 mLs (0.3 mg) into the muscle as needed for anaphylaxis    Need for desensitization to allergens       fluticasone 50 MCG/ACT spray    FLONASE    3 Bottle    Spray 1-2 sprays into both nostrils daily    Chronic seasonal allergic rhinitis due to pollen       nitroFURantoin 50 MG capsule    MACRODANTIN    28 capsule    Take 1 capsule (50 mg) by mouth daily as needed    Acute UTI       omeprazole 20 MG CR capsule    priLOSEC    90 capsule    Take 1 capsule (20 mg) by mouth daily    Abdominal pain, epigastric       ORDER FOR ALLERGEN IMMUNOTHERAPY 5 mL vial     5 mL    Name of Mix: Mix #1  Mold Alternaria Tenuis 1:10 w/v, HS  0.5 ml Hormodendrum Cladosporioides 1:10 w/v, HS 0.5 ml Diluent: HSA qs to 5ml    Chronic seasonal allergic rhinitis due to pollen, Allergic rhinitis due to mold       ORDER FOR ALLERGEN IMMUNOTHERAPY 5 mL vial     5 mL    Name of Mix: Mix #2  Tree  Julio Cesar, White 1:20 w/v, HS  0.5 ml Birch Mix PRW 1:20 w/v, HS  0.5 ml Boxelder-Maple Mix BHR (Boxelder Hard Red) 1:20 w/v, HS  0.5 ml [place], Common 1:20 w/v, HS  0.5 ml Elm, American 1:20 w/v, HS  0.5 ml Hackberry 1:20 w/v, HS 0.5 ml Hickory, Shagbark 1:20 w/v, HS  0.5 ml [place] Mix RW 1:20 w/v, HS 0.5 ml [place], Black 1:20 w/v, HS 0.5 ml Diluent: HSA qs to 5ml    Chronic seasonal allergic rhinitis due to pollen, Allergic rhinitis due to mold       ORDER FOR ALLERGEN IMMUNOTHERAPY 5 mL vial     5 mL    Name of Mix: Mix #3  Weeds Nettle 1:20 w/v, HS 0.5 ml Pigweed, Careless 1:20 w/v, HS 0.5 ml Plantain, English 1:20 w/v, HS 0.5 ml Diluent: HSA qs to 5ml    Chronic seasonal allergic rhinitis due to pollen, Allergic rhinitis due to mold

## 2018-08-30 NOTE — PROGRESS NOTES
Patient presented after waiting 30 minutes with no reaction to allergy injections. Discharged from clinic.    Casper Marks RN....8/30/2018 12:13 PM

## 2018-09-14 ENCOUNTER — OFFICE VISIT (OUTPATIENT)
Dept: UROLOGY | Facility: CLINIC | Age: 38
End: 2018-09-14
Payer: COMMERCIAL

## 2018-09-14 VITALS — OXYGEN SATURATION: 100 % | HEART RATE: 69 BPM | DIASTOLIC BLOOD PRESSURE: 73 MMHG | SYSTOLIC BLOOD PRESSURE: 113 MMHG

## 2018-09-14 DIAGNOSIS — N39.0 RECURRENT UTI: Primary | ICD-10-CM

## 2018-09-14 DIAGNOSIS — Z23 NEED FOR PROPHYLACTIC VACCINATION AND INOCULATION AGAINST INFLUENZA: ICD-10-CM

## 2018-09-14 LAB
ALBUMIN UR-MCNC: NEGATIVE MG/DL
APPEARANCE UR: CLEAR
BILIRUB UR QL STRIP: NEGATIVE
COLOR UR AUTO: YELLOW
GLUCOSE UR STRIP-MCNC: NEGATIVE MG/DL
HGB UR QL STRIP: ABNORMAL
KETONES UR STRIP-MCNC: NEGATIVE MG/DL
LEUKOCYTE ESTERASE UR QL STRIP: ABNORMAL
NITRATE UR QL: NEGATIVE
NON-SQ EPI CELLS #/AREA URNS LPF: ABNORMAL /LPF
PH UR STRIP: 7.5 PH (ref 5–7)
RBC #/AREA URNS AUTO: ABNORMAL /HPF
SOURCE: ABNORMAL
SP GR UR STRIP: 1.01 (ref 1–1.03)
UROBILINOGEN UR STRIP-ACNC: 0.2 EU/DL (ref 0.2–1)
WBC #/AREA URNS AUTO: ABNORMAL /HPF

## 2018-09-14 PROCEDURE — 90688 IIV4 VACCINE SPLT 0.5 ML IM: CPT | Performed by: UROLOGY

## 2018-09-14 PROCEDURE — 90471 IMMUNIZATION ADMIN: CPT | Performed by: UROLOGY

## 2018-09-14 PROCEDURE — 81001 URINALYSIS AUTO W/SCOPE: CPT | Performed by: UROLOGY

## 2018-09-14 PROCEDURE — 99213 OFFICE O/P EST LOW 20 MIN: CPT | Mod: 25 | Performed by: UROLOGY

## 2018-09-14 RX ORDER — NITROFURANTOIN MACROCRYSTALS 50 MG/1
50 CAPSULE ORAL DAILY PRN
Qty: 60 CAPSULE | Refills: 3 | Status: SHIPPED | OUTPATIENT
Start: 2018-09-14 | End: 2022-03-28

## 2018-09-14 NOTE — PROGRESS NOTES
Chief Complaint   Patient presents with     RECHECK     Flu Shot       Gogo Bourgeois is a 38 year old female who presents today for follow up of   Chief Complaint   Patient presents with     RECHECK     Flu Shot    f/u for recurrent UTI post coital.  She has been using macrodantin post coital which works well for her.  She has sex about 1-2 times per week.  She did stop using it and had UTIs.    Current Outpatient Prescriptions   Medication Sig Dispense Refill     cetirizine (ZYRTEC) 10 MG tablet Take 1-2 tablets (10-20 mg) by mouth daily       nitroFURantoin (MACRODANTIN) 50 MG capsule Take 1 capsule (50 mg) by mouth daily as needed 60 capsule 3     omeprazole (PRILOSEC) 20 MG CR capsule Take 1 capsule (20 mg) by mouth daily 90 capsule 3     EPINEPHrine (AUVI-Q) 0.3 MG/0.3ML injection 2-pack Inject 0.3 mLs (0.3 mg) into the muscle as needed for anaphylaxis 2 mL 2     ORDER FOR ALLERGEN IMMUNOTHERAPY Name of Mix: Mix #1  Mold  Alternaria Tenuis 1:10 w/v, HS  0.5 ml  Hormodendrum Cladosporioides 1:10 w/v, HS 0.5 ml  Diluent: HSA qs to 5ml 5 mL PRN     ORDER FOR ALLERGEN IMMUNOTHERAPY Name of Mix: Mix #2  Tree   Julio Cesar, White 1:20 w/v, HS  0.5 ml  Birch Mix PRW 1:20 w/v, HS  0.5 ml  Boxelder-Maple Mix BHR (Boxelder Hard Red) 1:20 w/v, HS  0.5 ml  [place], Common 1:20 w/v, HS  0.5 ml  Elm, American 1:20 w/v, HS  0.5 ml  Hackberry 1:20 w/v, HS 0.5 ml  Hickory, Shagbark 1:20 w/v, HS  0.5 ml  [place] Mix RW 1:20 w/v, HS 0.5 ml  [place], Black 1:20 w/v, HS 0.5 ml  Diluent: HSA qs to 5ml 5 mL PRN     ORDER FOR ALLERGEN IMMUNOTHERAPY Name of Mix: Mix #3  Weeds  Nettle 1:20 w/v, HS 0.5 ml  Pigweed, Careless 1:20 w/v, HS 0.5 ml  Plantain, English 1:20 w/v, HS 0.5 ml  Diluent: HSA qs to 5ml 5 mL PRN     Allergies   Allergen Reactions     Nkda [No Known Drug Allergies]      Seasonal Allergies       Past Medical History:   Diagnosis Date     Abnormal Pap smear of cervix     treated w/ cryotherapy in 2002     ASCUS on Pap smear  10/2013    Neg for high risk HPV     Esophageal reflux      LSIL (low grade squamous intraepithelial lesion) on Pap smear 13    repeat pap 6 months     Overweight, obesity and other hyperalimentation      Papanicolaou smear of cervix with low grade squamous intraepithelial lesion (LGSIL) 6/15/12    2012 colp - SUE 1     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 IMPLANTS  2012    in [place]     COSMETIC ABDOMINOPLASTY      plus plast of arms and inner thighs     HC REMOVAL GALLBLADDER  2001     LAPAROSCOPIC BYPASS GASTRIC  12    Sleeve done in [place]     LEE [place], CERVICAL  2002     TUBAL LIGATION       Family History   Problem Relation Age of Onset     Hypertension Maternal Grandmother      Respiratory Paternal Grandmother      smoker     Alcohol/Drug Paternal Grandfather      alcohlism, cirrhosis of liver     Social History     Social History     Marital status: Single     Spouse name: N/A     Number of children: N/A     Years of education: N/A     Social History Main Topics     Smoking status: Never Smoker     Smokeless tobacco: Never Used      Comment: Nonsmoking household     Alcohol use No     Drug use: No     Sexual activity: Yes     Partners: Male     Birth control/ protection: Female Surgical      Comment: tubal ligation     Other Topics Concern     Parent/Sibling W/ Cabg, Mi Or Angioplasty Before 65f 55m? No     Social History Narrative       REVIEW OF SYSTEMS  =================  C: NEGATIVE for fever, chills, change in weight  I: NEGATIVE for worrisome rashes, moles or lesions  E/M: NEGATIVE for ear, mouth and throat problems  R: NEGATIVE for significant cough or SHORTNESS OF BREATH,   CV: NEGATIVE for chest pain, palpitations or peripheral edema  GI: NEGATIVE for nausea, abdominal pain, heartburn, or change in bowel habits  NEURO: NEGATIVE any motor/sensory changes  PSYCH:  NEGATIVE for recent mood disorder    Physical Exam:  /73 (BP Location: Right arm, Patient Position: Chair, Cuff Size: Adult Regular)  Pulse 69  SpO2 100%   Patient is pleasant, in no acute distress, good general condition.  Lung: no evidence of respiratory distress    Abdomen: Soft, nondistended, non tender. No masses. No rebound or guarding.   Exam: normal female  Skin: Warm and dry.  No redness.  Psych: normal mood and affect  Neuro: alert and oriented    Assessment/Plan:   (N39.0) Recurrent UTI  (primary encounter diagnosis)  Comment:    Plan: UA neg           Cont macrodantin post coital           Patient to try to wean off meds                      (Z23) Need for prophylactic vaccination and inoculation against influenza  Comment:    Plan: FLU VACCINE, IM (QUADRIVALENT         W/PRESERVATIVES/MULTI-DOSE) [03749]- >3 YRS,         Vaccine Administration, Initial [70165]

## 2018-09-14 NOTE — PROGRESS NOTES
Prior to injection verified patient identity using patient's name and date of birth.  Due to injection administration, patient instructed to remain in clinic for 15 minutes  afterwards, and to report any adverse reaction to me immediately.        Injectable Influenza Immunization Documentation    1.  Is the person to be vaccinated sick today?   No    2. Does the person to be vaccinated have an allergy to a component   of the vaccine?   No  Egg Allergy Algorithm Link    3. Has the person to be vaccinated ever had a serious reaction   to influenza vaccine in the past?   No    4. Has the person to be vaccinated ever had Guillain-Barré syndrome?   No    Form completed by   Christine Lopez CMA

## 2018-09-14 NOTE — MR AVS SNAPSHOT
After Visit Summary   9/14/2018    Gogo Bourgeois    MRN: [id number]           Patient Information     Date Of Birth          1980        Visit Information        Provider Department      9/14/2018 8:00 AM Edson Ragland MD [facility]        Today's Diagnoses     Recurrent UTI    -  1    Need for prophylactic vaccination and inoculation against influenza           Follow-ups after your visit        Your next 10 appointments already scheduled     Sep 25, 2018 12:40 PM CDT   Nurse Only with FZ ALLERGY SHOTS   [facility] [place] ([facility])    [address] [address] [phone]              Who to contact     If you have questions or need follow up information about today's clinic visit or your schedule please contact [facility] directly at [phone].  Normal or non-critical lab and imaging results will be communicated to you by GruupMeethart, letter or phone within 4 business days after the clinic has received the results. If you do not hear from us within 7 days, please contact the clinic through GruupMeethart or phone. If you have a critical or abnormal lab result, we will notify you by phone as soon as possible.  Submit refill requests through SkillSlate or call your pharmacy and they will forward the refill request to us. Please allow 3 business days for your refill to be completed.          Additional Information About Your Visit        MyChart Information     SkillSlate gives you secure access to your electronic health record. If you see a primary care provider, you can also send messages to your care team and make appointments. If you have questions, please call your primary care clinic.  If you do not have a primary care provider, please call [phone] and they will assist you.        Care EveryWhere ID     This is your Care EveryWhere ID. This could be used by other organizations to access your [facility]  records  [id number]        Your Vitals Were     Pulse Pulse Oximetry                69 100%           Blood Pressure from Last 3 Encounters:   09/14/18 113/73   06/29/18 112/70   06/12/18 114/80    Weight from Last 3 Encounters:   06/29/18 65.5 kg (144 lb 6.4 oz)   06/12/18 65 kg (143 lb 6.4 oz)   05/08/18 65 kg (143 lb 6.4 oz)              We Performed the Following     FLU VACCINE, IM (QUADRIVALENT W/PRESERVATIVES/MULTI-DOSE) [85792]- >3 YRS     UA reflex to Microscopic and Culture     Urine Microscopic     Vaccine Administration, Initial [86254]          Today's Medication Changes          These changes are accurate as of 9/14/18  8:23 AM.  If you have any questions, ask your nurse or doctor.               Stop taking these medicines if you haven't already. Please contact your care team if you have questions.     fluticasone 50 MCG/ACT spray   Commonly known as:  FLONASE                Where to get your medicines      These medications were sent to [place] [address] [place] [facility] - [facility] [address] [place] [address]  [address] [place] [address]     Phone:  [phone]     nitroFURantoin 50 MG capsule                Primary Care Provider Office Phone # Fax #    Paul Weber PA-C [phone] [address] [place] [place] [address]        Equal Access to Services     [facility]: Hadii aad ku hadasho Soomaali, waaxda luqadaha, qaybta kaalmada adeegyada, rema negron . So [facility] [phone].    ATENCIÓN: Si habla español, tiene a arteaga disposición servicios gratuitos de asistencia lingüística. Luciana al [phone].    We comply with applicable federal civil rights laws and [place] laws. We do not discriminate on the basis of race, color, national origin, age, disability, sex, sexual orientation, or gender identity.            Thank you!     Thank you for choosing [facility]  for your care. Our goal is always to provide you with excellent care.  Hearing back from our patients is one way we can continue to improve our services. Please take a few minutes to complete the written survey that you may receive in the mail after your visit with us. Thank you!             Your Updated Medication List - Protect others around you: Learn how to safely use, store and throw away your medicines at www.disposemymeds.org.          This list is accurate as of 9/14/18  8:23 AM.  Always use your most recent med list.                   Brand Name Dispense Instructions for use Diagnosis    cetirizine 10 MG tablet    zyrTEC     Take 1-2 tablets (10-20 mg) by mouth daily        EPINEPHrine 0.3 MG/0.3ML injection 2-pack    AUVI-Q    2 mL    Inject 0.3 mLs (0.3 mg) into the muscle as needed for anaphylaxis    Need for desensitization to allergens       nitroFURantoin 50 MG capsule    MACRODANTIN    60 capsule    Take 1 capsule (50 mg) by mouth daily as needed        omeprazole 20 MG CR capsule    priLOSEC    90 capsule    Take 1 capsule (20 mg) by mouth daily    Abdominal pain, epigastric       ORDER FOR ALLERGEN IMMUNOTHERAPY 5 mL vial     5 mL    Name of Mix: Mix #1  Mold Alternaria Tenuis 1:10 w/v, HS  0.5 ml Hormodendrum Cladosporioides 1:10 w/v, HS 0.5 ml Diluent: HSA qs to 5ml    Chronic seasonal allergic rhinitis due to pollen, Allergic rhinitis due to mold       ORDER FOR ALLERGEN IMMUNOTHERAPY 5 mL vial     5 mL    Name of Mix: Mix #2  Tree  Julio Cesar, White 1:20 w/v, HS  0.5 ml Birch Mix PRW 1:20 w/v, HS  0.5 ml Boxelder-Maple Mix BHR (Boxelder Hard Red) 1:20 w/v, HS  0.5 ml [place], Common 1:20 w/v, HS  0.5 ml Elm, American 1:20 w/v, HS  0.5 ml Hackberry 1:20 w/v, HS 0.5 ml Hickory, Shagbark 1:20 w/v, HS  0.5 ml [place] Mix RW 1:20 w/v, HS 0.5 ml [place], Black 1:20 w/v, HS 0.5 ml Diluent: HSA qs to 5ml    Chronic seasonal allergic rhinitis due to pollen, Allergic rhinitis due to mold       ORDER FOR ALLERGEN IMMUNOTHERAPY 5 mL vial     5 mL    Name of Mix: Mix #3  Weeds  Nettle 1:20 w/v, HS 0.5 ml Pigweed, Careless 1:20 w/v, HS 0.5 ml Plantain, English 1:20 w/v, HS 0.5 ml Diluent: HSA qs to 5ml    Chronic seasonal allergic rhinitis due to pollen, Allergic rhinitis due to mold

## 2018-09-25 ENCOUNTER — ALLIED HEALTH/NURSE VISIT (OUTPATIENT)
Dept: ALLERGY | Facility: CLINIC | Age: 38
End: 2018-09-25
Payer: COMMERCIAL

## 2018-09-25 DIAGNOSIS — J30.9 ALLERGIC RHINITIS: Primary | ICD-10-CM

## 2018-09-25 PROCEDURE — 95117 IMMUNOTHERAPY INJECTIONS: CPT | Performed by: ALLERGY & IMMUNOLOGY

## 2018-09-25 PROCEDURE — 99207 ZZC DROP WITH A PROCEDURE: CPT | Performed by: ALLERGY & IMMUNOLOGY

## 2018-09-25 NOTE — MR AVS SNAPSHOT
After Visit Summary   9/25/2018    Gogo Bourgeois    MRN: [id number]           Patient Information     Date Of Birth          1980        Visit Information        Provider Department      9/25/2018 12:40 PM FZ ALLERGY SHOTS [facility]        Today's Diagnoses     Allergic rhinitis    -  1       Follow-ups after your visit        Your next 10 appointments already scheduled     Oct 23, 2018 12:40 PM CDT   Nurse Only with FZ ALLERGY SHOTS   [facility] [place] ([facility])    [address]  [place] [address] [phone]              Who to contact     If you have questions or need follow up information about today's clinic visit or your schedule please contact [facility] directly at [phone].  Normal or non-critical lab and imaging results will be communicated to you by MyChart, letter or phone within 4 business days after the clinic has received the results. If you do not hear from us within 7 days, please contact the clinic through MyChart or phone. If you have a critical or abnormal lab result, we will notify you by phone as soon as possible.  Submit refill requests through [employer] or call your pharmacy and they will forward the refill request to us. Please allow 3 business days for your refill to be completed.          Additional Information About Your Visit        MyChart Information     [employer] gives you secure access to your electronic health record. If you see a primary care provider, you can also send messages to your care team and make appointments. If you have questions, please call your primary care clinic.  If you do not have a primary care provider, please call [phone] and they will assist you.        Care EveryWhere ID     This is your Care EveryWhere ID. This could be used by other organizations to access your [place] medical records  [id number]         Blood Pressure from Last 3 Encounters:   09/14/18 113/73    06/29/18 112/70   06/12/18 114/80    Weight from Last 3 Encounters:   06/29/18 65.5 kg (144 lb 6.4 oz)   06/12/18 65 kg (143 lb 6.4 oz)   05/08/18 65 kg (143 lb 6.4 oz)              We Performed the Following     Allergy Shot: Two or more injections        Primary Care Provider Office Phone # Fax #    Paulmaryann Weber PA-C [phone]       [address] [place] [address]        Equal Access to Services     [facility]: Hadii aad ku hadasho Soomaali, waaxda luqadaha, qaybta kaalmada adeegyada, waxqiana bruecin haydaronn micaela negron . So [facility] [phone].    ATENCIÓN: Si habla español, tiene a arteaga disposición servicios gratuitos de asistencia lingüística. [facility] [phone].    We comply with applicable federal civil rights laws and [place] laws. We do not discriminate on the basis of race, color, national origin, age, disability, sex, sexual orientation, or gender identity.            Thank you!     Thank you for choosing [facility]  for your care. Our goal is always to provide you with excellent care. Hearing back from our patients is one way we can continue to improve our services. Please take a few minutes to complete the written survey that you may receive in the mail after your visit with us. Thank you!             Your Updated Medication List - Protect others around you: Learn how to safely use, store and throw away your medicines at www.disposemymeds.org.          This list is accurate as of 9/25/18  1:10 PM.  Always use your most recent med list.                   Brand Name Dispense Instructions for use Diagnosis    cetirizine 10 MG tablet    zyrTEC     Take 1-2 tablets (10-20 mg) by mouth daily        EPINEPHrine 0.3 MG/0.3ML injection 2-pack    AUVI-Q    2 mL    Inject 0.3 mLs (0.3 mg) into the muscle as needed for anaphylaxis    Need for desensitization to allergens       nitroFURantoin 50 MG capsule    MACRODANTIN    60 capsule    Take 1 capsule (50 mg) by  mouth daily as needed        omeprazole 20 MG CR capsule    priLOSEC    90 capsule    Take 1 capsule (20 mg) by mouth daily    Abdominal pain, epigastric       ORDER FOR ALLERGEN IMMUNOTHERAPY 5 mL vial     5 mL    Name of Mix: Mix #1  Mold Alternaria Tenuis 1:10 w/v, HS  0.5 ml Hormodendrum Cladosporioides 1:10 w/v, HS 0.5 ml Diluent: HSA qs to 5ml    Chronic seasonal allergic rhinitis due to pollen, Allergic rhinitis due to mold       ORDER FOR ALLERGEN IMMUNOTHERAPY 5 mL vial     5 mL    Name of Mix: Mix #2  Tree  Julio Cesar, White 1:20 w/v, HS  0.5 ml Birch Mix PRW 1:20 w/v, HS  0.5 ml Boxelder-Maple Mix BHR (Boxelder Hard Red) 1:20 w/v, HS  0.5 ml [place], Common 1:20 w/v, HS  0.5 ml Elm, American 1:20 w/v, HS  0.5 ml Hackberry 1:20 w/v, HS 0.5 ml Hickory, Shagbark 1:20 w/v, HS  0.5 ml [place] Mix RW 1:20 w/v, HS 0.5 ml [place], Black 1:20 w/v, HS 0.5 ml Diluent: HSA qs to 5ml    Chronic seasonal allergic rhinitis due to pollen, Allergic rhinitis due to mold       ORDER FOR ALLERGEN IMMUNOTHERAPY 5 mL vial     5 mL    Name of Mix: Mix #3  Weeds Nettle 1:20 w/v, HS 0.5 ml Pigweed, Careless 1:20 w/v, HS 0.5 ml Plantain, English 1:20 w/v, HS 0.5 ml Diluent: HSA qs to 5ml    Chronic seasonal allergic rhinitis due to pollen, Allergic rhinitis due to mold

## 2018-10-30 ENCOUNTER — ALLIED HEALTH/NURSE VISIT (OUTPATIENT)
Dept: ALLERGY | Facility: CLINIC | Age: 38
End: 2018-10-30
Payer: COMMERCIAL

## 2018-10-30 DIAGNOSIS — J30.9 ALLERGIC RHINITIS: Primary | ICD-10-CM

## 2018-10-30 PROCEDURE — 95117 IMMUNOTHERAPY INJECTIONS: CPT

## 2018-10-30 NOTE — MR AVS SNAPSHOT
After Visit Summary   10/30/2018    Gogo Bourgeois    MRN: [id number]           Patient Information     Date Of Birth          1980        Visit Information        Provider Department      10/30/2018 3:30 PM FZ ALLERGY SHOTS [facility] [place]        Today's Diagnoses     Allergic rhinitis    -  1       Follow-ups after your visit        Who to contact     If you have questions or need follow up information about today's clinic visit or your schedule please contact [facility] [place] directly at [phone].  Normal or non-critical lab and imaging results will be communicated to you by Post.Bid.Shiphart, letter or phone within 4 business days after the clinic has received the results. If you do not hear from us within 7 days, please contact the clinic through EdCalibert or phone. If you have a critical or abnormal lab result, we will notify you by phone as soon as possible.  Submit refill requests through RUSBASE or call your pharmacy and they will forward the refill request to us. Please allow 3 business days for your refill to be completed.          Additional Information About Your Visit        MyChart Information     RUSBASE gives you secure access to your electronic health record. If you see a primary care provider, you can also send messages to your care team and make appointments. If you have questions, please call your primary care clinic.  If you do not have a primary care provider, please call [phone] and they will assist you.        Care EveryWhere ID     This is your Care EveryWhere ID. This could be used by other organizations to access your [facility] medical records  [id number]         Blood Pressure from Last 3 Encounters:   09/14/18 113/73   06/29/18 112/70   06/12/18 114/80    Weight from Last 3 Encounters:   06/29/18 65.5 kg (144 lb 6.4 oz)   06/12/18 65 kg (143 lb 6.4 oz)   05/08/18 65 kg (143 lb 6.4 oz)              We Performed the Following     Allergy Shot: Two or more  injections        Primary Care Provider Office Phone # Fax #    Paul Weber PA-C [phone]       [address] [place] [address]        Equal Access to Services     ZENAIDA CARR : Hadii aad ku hadsangitao Soomaali, waaxda luqadaha, qaybta kaalmada adeegyada, waxay idiin haydaronn adeakanksha roman laJulychente umaña. So [facility] [phone].    ATENCIÓN: Si habla español, tiene a arteaga disposición servicios gratuitos de asistencia lingüística. Llame al [phone].    We comply with applicable federal civil rights laws and [place] laws. We do not discriminate on the basis of race, color, national origin, age, disability, sex, sexual orientation, or gender identity.            Thank you!     Thank you for choosing [facility]  for your care. Our goal is always to provide you with excellent care. Hearing back from our patients is one way we can continue to improve our services. Please take a few minutes to complete the written survey that you may receive in the mail after your visit with us. Thank you!             Your Updated Medication List - Protect others around you: Learn how to safely use, store and throw away your medicines at www.disposemymeds.org.          This list is accurate as of 10/30/18  3:38 PM.  Always use your most recent med list.                   Brand Name Dispense Instructions for use Diagnosis    cetirizine 10 MG tablet    zyrTEC     Take 1-2 tablets (10-20 mg) by mouth daily        EPINEPHrine 0.3 MG/0.3ML injection 2-pack    AUVI-Q    2 mL    Inject 0.3 mLs (0.3 mg) into the muscle as needed for anaphylaxis    Need for desensitization to allergens       nitroFURantoin 50 MG capsule    MACRODANTIN    60 capsule    Take 1 capsule (50 mg) by mouth daily as needed        omeprazole 20 MG CR capsule    priLOSEC    90 capsule    Take 1 capsule (20 mg) by mouth daily    Abdominal pain, epigastric       ORDER FOR ALLERGEN IMMUNOTHERAPY 5 mL vial     5 mL    Name of Mix: Mix #1  Mold  Alternaria Tenuis 1:10 w/v, HS  0.5 ml Hormodendrum Cladosporioides 1:10 w/v, HS 0.5 ml Diluent: HSA qs to 5ml    Chronic seasonal allergic rhinitis due to pollen, Allergic rhinitis due to mold       ORDER FOR ALLERGEN IMMUNOTHERAPY 5 mL vial     5 mL    Name of Mix: Mix #2  Tree  Julio Cesar, White 1:20 w/v, HS  0.5 ml Birch Mix PRW 1:20 w/v, HS  0.5 ml Boxelder-Maple Mix BHR (Boxelder Hard Red) 1:20 w/v, HS  0.5 ml [place], Common 1:20 w/v, HS  0.5 ml Elm, American 1:20 w/v, HS  0.5 ml Hackberry 1:20 w/v, HS 0.5 ml Hickory, Shagbark 1:20 w/v, HS  0.5 ml [place]ptos Mix RW 1:20 w/v, HS 0.5 ml [place], Black 1:20 w/v, HS 0.5 ml Diluent: HSA qs to 5ml    Chronic seasonal allergic rhinitis due to pollen, Allergic rhinitis due to mold       ORDER FOR ALLERGEN IMMUNOTHERAPY 5 mL vial     5 mL    Name of Mix: Mix #3  Weeds Nettle 1:20 w/v, HS 0.5 ml Pigweed, Careless 1:20 w/v, HS 0.5 ml Plantain, English 1:20 w/v, HS 0.5 ml Diluent: HSA qs to 5ml    Chronic seasonal allergic rhinitis due to pollen, Allergic rhinitis due to mold

## 2018-11-27 ENCOUNTER — ALLIED HEALTH/NURSE VISIT (OUTPATIENT)
Dept: ALLERGY | Facility: CLINIC | Age: 38
End: 2018-11-27
Payer: COMMERCIAL

## 2018-11-27 DIAGNOSIS — J30.9 ALLERGIC RHINITIS: Primary | ICD-10-CM

## 2018-11-27 PROCEDURE — 99207 ZZC DROP WITH A PROCEDURE: CPT

## 2018-11-27 PROCEDURE — 95117 IMMUNOTHERAPY INJECTIONS: CPT

## 2018-11-27 NOTE — PROGRESS NOTES
Patient presented after waiting 30 minutes with normal reaction to allergy injections. Discharged from clinic.    Lori Nguyen RN ............   11/27/2018...5:22 PM

## 2018-11-27 NOTE — MR AVS SNAPSHOT
After Visit Summary   11/27/2018    Gogo Bourgeois    MRN: [id number]           Patient Information     Date Of Birth          1980        Visit Information        Provider Department      11/27/2018 5:00 PM FZ ALLERGY SHOTS [facility]        Today's Diagnoses     Allergic rhinitis    -  1       Follow-ups after your visit        Who to contact     If you have questions or need follow up information about today's clinic visit or your schedule please contact [facility] directly at [phone].  Normal or non-critical lab and imaging results will be communicated to you by AVIcodehart, letter or phone within 4 business days after the clinic has received the results. If you do not hear from us within 7 days, please contact the clinic through EventKloudt or phone. If you have a critical or abnormal lab result, we will notify you by phone as soon as possible.  Submit refill requests through Sand Technology or call your pharmacy and they will forward the refill request to us. Please allow 3 business days for your refill to be completed.          Additional Information About Your Visit        MyChart Information     Sand Technology gives you secure access to your electronic health record. If you see a primary care provider, you can also send messages to your care team and make appointments. If you have questions, please call your primary care clinic.  If you do not have a primary care provider, please call [phone] and they will assist you.        Care EveryWhere ID     This is your Care EveryWhere ID. This could be used by other organizations to access your [facility] medical records  [id number]         Blood Pressure from Last 3 Encounters:   09/14/18 113/73   06/29/18 112/70   06/12/18 114/80    Weight from Last 3 Encounters:   06/29/18 65.5 kg (144 lb 6.4 oz)   06/12/18 65 kg (143 lb 6.4 oz)   05/08/18 65 kg (143 lb 6.4 oz)              We Performed the Following     Allergy Shot: Two or more  injections        Primary Care Provider Office Phone # Fax #    Paul Weber PA-C [phone]       [address] [place] [address]        Equal Access to Services     ZENAIDA CARR : Hadii aad ku hadsangitao Soomaali, waaxda luqadaha, qaybta kaalmada adeegyada, waxay idiin haydaronn adeakanksha roman laJulychente umaña. So [facility] [phone].    ATENCIÓN: Si habla español, tiene a arteaga disposición servicios gratuitos de asistencia lingüística. Llame al [phone].    We comply with applicable federal civil rights laws and [place] laws. We do not discriminate on the basis of race, color, national origin, age, disability, sex, sexual orientation, or gender identity.            Thank you!     Thank you for choosing [facility]  for your care. Our goal is always to provide you with excellent care. Hearing back from our patients is one way we can continue to improve our services. Please take a few minutes to complete the written survey that you may receive in the mail after your visit with us. Thank you!             Your Updated Medication List - Protect others around you: Learn how to safely use, store and throw away your medicines at www.disposemymeds.org.          This list is accurate as of 11/27/18  5:22 PM.  Always use your most recent med list.                   Brand Name Dispense Instructions for use Diagnosis    cetirizine 10 MG tablet    zyrTEC     Take 1-2 tablets (10-20 mg) by mouth daily        EPINEPHrine 0.3 MG/0.3ML injection 2-pack    AUVI-Q    2 mL    Inject 0.3 mLs (0.3 mg) into the muscle as needed for anaphylaxis    Need for desensitization to allergens       nitroFURantoin macrocrystal 50 MG capsule    MACRODANTIN    60 capsule    Take 1 capsule (50 mg) by mouth daily as needed        omeprazole 20 MG DR capsule    priLOSEC    90 capsule    Take 1 capsule (20 mg) by mouth daily    Abdominal pain, epigastric       ORDER FOR ALLERGEN IMMUNOTHERAPY 5 mL vial     5 mL    Name of Mix:  Mix #1  Mold Alternaria Tenuis 1:10 w/v, HS  0.5 ml Hormodendrum Cladosporioides 1:10 w/v, HS 0.5 ml Diluent: HSA qs to 5ml    Chronic seasonal allergic rhinitis due to pollen, Allergic rhinitis due to mold       ORDER FOR ALLERGEN IMMUNOTHERAPY 5 mL vial     5 mL    Name of Mix: Mix #2  Tree  Julio Cesar, White 1:20 w/v, HS  0.5 ml Birch Mix PRW 1:20 w/v, HS  0.5 ml Boxelder-Maple Mix BHR (Boxelder Hard Red) 1:20 w/v, HS  0.5 ml [place], Common 1:20 w/v, HS  0.5 ml Elm, American 1:20 w/v, HS  0.5 ml Hackberry 1:20 w/v, HS 0.5 ml Hickory, Shagbark 1:20 w/v, HS  0.5 ml Maple Mix RW 1:20 w/v, HS 0.5 ml [place], Black 1:20 w/v, HS 0.5 ml Diluent: HSA qs to 5ml    Chronic seasonal allergic rhinitis due to pollen, Allergic rhinitis due to mold       ORDER FOR ALLERGEN IMMUNOTHERAPY 5 mL vial     5 mL    Name of Mix: Mix #3  Weeds Nettle 1:20 w/v, HS 0.5 ml Pigweed, Careless 1:20 w/v, HS 0.5 ml Plantain, English 1:20 w/v, HS 0.5 ml Diluent: HSA qs to 5ml    Chronic seasonal allergic rhinitis due to pollen, Allergic rhinitis due to mold

## 2018-12-28 ENCOUNTER — OFFICE VISIT (OUTPATIENT)
Dept: URGENT CARE | Facility: URGENT CARE | Age: 38
End: 2018-12-28
Payer: COMMERCIAL

## 2018-12-28 VITALS
DIASTOLIC BLOOD PRESSURE: 75 MMHG | WEIGHT: 143 LBS | BODY MASS INDEX: 27.02 KG/M2 | SYSTOLIC BLOOD PRESSURE: 120 MMHG | TEMPERATURE: 97.2 F | HEART RATE: 79 BPM | OXYGEN SATURATION: 100 %

## 2018-12-28 DIAGNOSIS — N89.8 VAGINAL DISCHARGE: ICD-10-CM

## 2018-12-28 DIAGNOSIS — B96.89 BACTERIAL VAGINOSIS: ICD-10-CM

## 2018-12-28 DIAGNOSIS — S61.219A LACERATION OF FINGER OF RIGHT HAND WITHOUT FOREIGN BODY WITHOUT DAMAGE TO NAIL, UNSPECIFIED FINGER, INITIAL ENCOUNTER: ICD-10-CM

## 2018-12-28 DIAGNOSIS — R82.90 NONSPECIFIC FINDING ON EXAMINATION OF URINE: ICD-10-CM

## 2018-12-28 DIAGNOSIS — B37.31 YEAST VAGINITIS: ICD-10-CM

## 2018-12-28 DIAGNOSIS — N30.91 CYSTITIS WITH HEMATURIA: Primary | ICD-10-CM

## 2018-12-28 DIAGNOSIS — N76.0 BACTERIAL VAGINOSIS: ICD-10-CM

## 2018-12-28 LAB
ALBUMIN UR-MCNC: NEGATIVE MG/DL
APPEARANCE UR: ABNORMAL
BACTERIA #/AREA URNS HPF: ABNORMAL /HPF
BILIRUB UR QL STRIP: NEGATIVE
COLOR UR AUTO: YELLOW
GLUCOSE UR STRIP-MCNC: NEGATIVE MG/DL
HGB UR QL STRIP: ABNORMAL
KETONES UR STRIP-MCNC: NEGATIVE MG/DL
LEUKOCYTE ESTERASE UR QL STRIP: ABNORMAL
NITRATE UR QL: POSITIVE
NON-SQ EPI CELLS #/AREA URNS LPF: ABNORMAL /LPF
PH UR STRIP: 5.5 PH (ref 5–7)
RBC #/AREA URNS AUTO: ABNORMAL /HPF
SOURCE: ABNORMAL
SP GR UR STRIP: 1.02 (ref 1–1.03)
SPECIMEN SOURCE: ABNORMAL
UROBILINOGEN UR STRIP-ACNC: 0.2 EU/DL (ref 0.2–1)
WBC #/AREA URNS AUTO: ABNORMAL /HPF
WET PREP SPEC: ABNORMAL

## 2018-12-28 PROCEDURE — 87086 URINE CULTURE/COLONY COUNT: CPT | Performed by: FAMILY MEDICINE

## 2018-12-28 PROCEDURE — 12001 RPR S/N/AX/GEN/TRNK 2.5CM/<: CPT | Performed by: FAMILY MEDICINE

## 2018-12-28 PROCEDURE — 87088 URINE BACTERIA CULTURE: CPT | Performed by: FAMILY MEDICINE

## 2018-12-28 PROCEDURE — 87210 SMEAR WET MOUNT SALINE/INK: CPT | Performed by: FAMILY MEDICINE

## 2018-12-28 PROCEDURE — 99214 OFFICE O/P EST MOD 30 MIN: CPT | Mod: 25 | Performed by: FAMILY MEDICINE

## 2018-12-28 PROCEDURE — 81001 URINALYSIS AUTO W/SCOPE: CPT | Performed by: FAMILY MEDICINE

## 2018-12-28 PROCEDURE — 87186 SC STD MICRODIL/AGAR DIL: CPT | Performed by: FAMILY MEDICINE

## 2018-12-28 RX ORDER — SULFAMETHOXAZOLE/TRIMETHOPRIM 800-160 MG
1 TABLET ORAL 2 TIMES DAILY
Qty: 14 TABLET | Refills: 0 | Status: SHIPPED | OUTPATIENT
Start: 2018-12-28 | End: 2019-01-04

## 2018-12-28 RX ORDER — FLUCONAZOLE 150 MG/1
150 TABLET ORAL ONCE
Qty: 2 TABLET | Refills: 0 | Status: SHIPPED | OUTPATIENT
Start: 2018-12-28 | End: 2018-12-28

## 2018-12-28 RX ORDER — METRONIDAZOLE 500 MG/1
500 TABLET ORAL 2 TIMES DAILY
Qty: 14 TABLET | Refills: 0 | Status: SHIPPED | OUTPATIENT
Start: 2018-12-28 | End: 2019-01-04

## 2018-12-29 NOTE — PROGRESS NOTES
Chief complaint: uti     7 days ago had intercourse and she forgot to use prophylactic uti  Denies any possibility of pregnancy declined a pregnancy test  Dysuria/ugency/frequency 3  days  Vaginal discharge or concerns: also having vaginal dc   Off and on for about 2 months   Worse lately 5-6 days cheesy foul smelling.     Tried monistat a  Month ago     Fever chills: None  Nausea vomiting: None  Flank Pain: None  Patient denies possibility of pregnancy  Denies possibility of STD, declined STD testing.    Right thumb laceration  Patient was cleaning the fridge took the basket and there was a metal there and finger got stuck   Right middle and ring  No numbness or tingling       Problem list and histories reviewed & adjusted, as indicated.  Additional history: as documented    Problem list, Medication list, Allergies, and Medical/Social/Surgical histories reviewed in [facility] and updated as appropriate.    ROS:  Constitutional, HEENT, cardiovascular, pulmonary, gi and gu systems are negative, except as otherwise noted.    OBJECTIVE:                                                    /75   Pulse 79   Temp 97.2  F (36.2  C) (Oral)   Wt 64.9 kg (143 lb)   SpO2 100%   BMI 27.02 kg/m    Body mass index is 27.02 kg/m .  GENERAL: healthy, alert and no distress  NECK: no adenopathy, no asymmetry, masses, or scars and thyroid normal to palpation  RESP: lungs clear to auscultation - no rales, rhonchi or wheezes  CV: regular rate and rhythm, normal S1 S2, no S3 or S4, no murmur, click or rub, no peripheral edema and peripheral pulses strong  ABDOMEN: soft, nontender, no hepatosplenomegaly, no masses and bowel sounds normal  MS: no gross musculoskeletal defects noted, no edema  Psych pleasant no sad     Diagnostic Test Results:  Results for orders placed or performed in visit on 12/28/18 (from the past 24 hour(s))   UA reflex to Microscopic and Culture   Result Value Ref Range    Color Urine Yellow     Appearance Urine  Cloudy     Glucose Urine Negative NEG^Negative mg/dL    Bilirubin Urine Negative NEG^Negative    Ketones Urine Negative NEG^Negative mg/dL    Specific Gravity Urine 1.025 1.003 - 1.035    Blood Urine Moderate (A) NEG^Negative    pH Urine 5.5 5.0 - 7.0 pH    Protein Albumin Urine Negative NEG^Negative mg/dL    Urobilinogen Urine 0.2 0.2 - 1.0 EU/dL    Nitrite Urine Positive (A) NEG^Negative    Leukocyte Esterase Urine Large (A) NEG^Negative    Source Midstream Urine    Urine Microscopic   Result Value Ref Range    WBC Urine  (A) OTO5^0 - 5 /HPF    RBC Urine 25-50 (A) OTO2^O - 2 /HPF    Squamous Epithelial /LPF Urine Moderate (A) FEW^Few /LPF    Bacteria Urine Many (A) NEG^Negative /HPF   Wet prep   Result Value Ref Range    Specimen Description Vagina     Wet Prep No Trichomonas seen     Wet Prep Clue cells seen (A)     Wet Prep Yeast seen (A)             Last tetanus booster within 10 years: yes  Laceration LOCATION: right middle finger volar aspect of tip - small 4mm semicircular superficial cut   On the right ring finger volar aspect distal finger very superfiical 3mm cut     Tendon function, sensation intact. No weakness. Good pulses. Good range of motion  Cap refill intact.  Wound clean. No Foreign body noted.     LACERATION REPAIR:   Oral consent received.  Patient aware of risks which include but are not limited to infection, bleeding, iatrogenic injury. Alternative treatment plan was also discussed  Copious irrigation with normal saline done.  Wound cleaned with saline. No FBs.    Cleaned with betasept   Sterile fields maintained at all times   Laceration repaired using dermabond  Patient tolerated procedure well.      Advised to watch for signs or symptoms of infection. If with any concerns of infection advised to come in immediately to be reassessed. Routine wound care discussed.              ASSESSMENT/PLAN:                                                        ICD-10-CM    1. Cystitis with  hematuria N30.91 UA reflex to Microscopic and Culture     Urine Microscopic     sulfamethoxazole-trimethoprim (BACTRIM DS) 800-160 MG tablet   2. Nonspecific finding on examination of urine R82.90 Urine Culture Aerobic Bacterial   3. Vaginal discharge N89.8 Wet prep   4. Bacterial vaginosis N76.0 metroNIDAZOLE (FLAGYL) 500 MG tablet    B96.89    5. Yeast vaginitis B37.3 fluconazole (DIFLUCAN) 150 MG tablet   6. Laceration of finger of right hand without foreign body without damage to nail, unspecified finger, initial encounter S61.219A REPAIR SUPERFICIAL, WOUND BODY < =2.5CM     Prescribed with above  Prescribed with metronidazole or flagyl. Warned about metallic taste and advised no alcohol until 24 hours after the last dose.   . Aware that Fluconazole does have side effects that were discussed, aware of concerns for liver function and bone marrow suppression. Aware contraindicated if pregnant or breastfeeding.   Prescribed with bactrim   Follow up with urology recommended   Aware to go to ER or come in immediately if with any fever chills nausea vomiting or flank pain.  Adverse reactions of medications discussed.  Over the counter medications discussed.   Aware to come back in if with worsening symptoms or if no relief despite treatment plan  Patient voiced understanding and had no further questions.     MD Emily Urban MD  [facility] [place]

## 2018-12-30 LAB
BACTERIA SPEC CULT: ABNORMAL
SPECIMEN SOURCE: ABNORMAL

## 2019-01-08 ENCOUNTER — ALLIED HEALTH/NURSE VISIT (OUTPATIENT)
Dept: ALLERGY | Facility: CLINIC | Age: 39
End: 2019-01-08
Payer: COMMERCIAL

## 2019-01-08 DIAGNOSIS — J30.1 SEASONAL ALLERGIC RHINITIS DUE TO POLLEN: Primary | ICD-10-CM

## 2019-01-08 PROCEDURE — 99207 ZZC DROP WITH A PROCEDURE: CPT

## 2019-01-08 PROCEDURE — 95117 IMMUNOTHERAPY INJECTIONS: CPT

## 2019-01-08 NOTE — PROGRESS NOTES
Patient presented after waiting 30 minutes with normal reaction to allergy injections. Discharged from clinic.    Lori Nguyen RN ............   1/8/2019...5:26 PM

## 2019-01-21 ENCOUNTER — ALLIED HEALTH/NURSE VISIT (OUTPATIENT)
Dept: ALLERGY | Facility: CLINIC | Age: 39
End: 2019-01-21
Payer: COMMERCIAL

## 2019-01-21 DIAGNOSIS — J30.1 SEASONAL ALLERGIC RHINITIS DUE TO POLLEN: Primary | ICD-10-CM

## 2019-01-21 PROCEDURE — 99207 ZZC DROP WITH A PROCEDURE: CPT

## 2019-01-21 PROCEDURE — 95117 IMMUNOTHERAPY INJECTIONS: CPT

## 2019-01-21 NOTE — PROGRESS NOTES
Patient presented after waiting 30 minutes with no reaction to allergy injections. Discharged from clinic.    Casper Marks RN....1/21/2019 12:02 PM

## 2019-02-12 ENCOUNTER — ALLIED HEALTH/NURSE VISIT (OUTPATIENT)
Dept: ALLERGY | Facility: CLINIC | Age: 39
End: 2019-02-12
Payer: COMMERCIAL

## 2019-02-12 DIAGNOSIS — J30.1 CHRONIC SEASONAL ALLERGIC RHINITIS DUE TO POLLEN: ICD-10-CM

## 2019-02-12 DIAGNOSIS — J30.1 SEASONAL ALLERGIC RHINITIS DUE TO POLLEN: Primary | ICD-10-CM

## 2019-02-12 DIAGNOSIS — J30.89 ALLERGIC RHINITIS DUE TO MOLD: ICD-10-CM

## 2019-02-12 PROCEDURE — 99207 ZZC DROP WITH A PROCEDURE: CPT

## 2019-02-12 PROCEDURE — 95117 IMMUNOTHERAPY INJECTIONS: CPT

## 2019-02-12 NOTE — TELEPHONE ENCOUNTER
ALLERGY SOLUTION RE-ORDER REQUEST    Gogo Bourgeois 1980 MRN: [id number]    DATE NEEDED:  3/5/19  Vial Color Content   Top Dose       Last Dose     Vial Size  Red 1:1 Trees   Red 1:1 0.5   Red 1:10.5 5 ml  Red 1:1 Molds   Red 1:1 0.5   Red 1:10.5 5 ml  Red 1:1 Weeds   Red 1:1 0.5   Red 1:10.5 5 ml        Serum reorder consent signed and patient/parent was advised that new serums would be ordered through the pharmacy and billed to their insurance company when they arrive in clinic. Yes    Shot Clinic Location:  [facility]  Ship to Location: [facility]  Serum billed to:  [facility]    Special Instructions:  none      Updated Prescription Needed: No      Requester Signature  Casper Marks RN....2/12/2019 1:12 PM

## 2019-02-12 NOTE — PROGRESS NOTES
Patient presented after waiting 30 minutes with no reaction to allergy injections. Discharged from clinic.    Casper Marks RN....2/12/2019 2:32 PM

## 2019-02-19 DIAGNOSIS — Z51.6 NEED FOR DESENSITIZATION TO ALLERGENS: Primary | ICD-10-CM

## 2019-02-19 PROCEDURE — 95165 ANTIGEN THERAPY SERVICES: CPT | Performed by: ALLERGY & IMMUNOLOGY

## 2019-02-19 NOTE — PROGRESS NOTES
Allergy serums billed at [facility].     Vials received below:    Vial Color Content                      Vial Size Expiration Date  Red 1:1 Molds 5mL  02/18/2020  Red 1:1 Trees 5mL  02/18/2020  Red 1:1 Weeds 5mL  02/18/2020      Original Refill encounter date: 02/12/2019      Signature    Bonnie Mott CMA  2/19/2019  11:03 AM

## 2019-02-19 NOTE — TELEPHONE ENCOUNTER
Allergy serums received at [facility].     Vials received below:    Vial Color Content                      Vial Size Expiration Date  Red 1:1 Molds 5mL  02/18/2020  Red 1:1 Trees 5mL  02/18/2020  Red 1:1 Weeds 5mL  02/18/2020      Signature  Bonnie Mott, [facility]  2/19/2019  11:01 AM

## 2019-03-19 ENCOUNTER — ALLIED HEALTH/NURSE VISIT (OUTPATIENT)
Dept: ALLERGY | Facility: CLINIC | Age: 39
End: 2019-03-19
Payer: COMMERCIAL

## 2019-03-19 DIAGNOSIS — J30.1 SEASONAL ALLERGIC RHINITIS DUE TO POLLEN: Primary | ICD-10-CM

## 2019-03-19 PROCEDURE — 95117 IMMUNOTHERAPY INJECTIONS: CPT

## 2019-03-19 PROCEDURE — 99207 ZZC DROP WITH A PROCEDURE: CPT

## 2019-03-19 NOTE — PROGRESS NOTES
Patient presented after waiting 30 minutes with normal reaction to allergy injections. Discharged from clinic.    Lori Nguyen RN ............   3/19/2019...3:52 PM

## 2019-03-26 ENCOUNTER — ALLIED HEALTH/NURSE VISIT (OUTPATIENT)
Dept: ALLERGY | Facility: CLINIC | Age: 39
End: 2019-03-26
Payer: COMMERCIAL

## 2019-03-26 DIAGNOSIS — J30.1 SEASONAL ALLERGIC RHINITIS DUE TO POLLEN: Primary | ICD-10-CM

## 2019-03-26 PROCEDURE — 95117 IMMUNOTHERAPY INJECTIONS: CPT

## 2019-03-26 PROCEDURE — 99207 ZZC DROP WITH A PROCEDURE: CPT

## 2019-03-26 NOTE — PROGRESS NOTES
Patient presents for allergy injections. Reports that she has not been premedicating per instructions since last summer. She admits that she has been telling shot staff that she has been premedicating, but that she has not been doing so as she has not had problems with itching post injections. Patient was advised to start premedicating November 2017 due to systemic symptoms of whole body itching. Discussed with Dr. Olivas, who states that since the patient has not had concerns after discontinuing premedicating she can continue without the additional premedication. The patient should still take one tab of antihistamine the day of injections. Discussed this with the patient, she verbalized understanding.     Patient presented after waiting 30 minutes with normal reaction to allergy injections. Discharged from clinic.    Lori Nguyen RN ............   3/26/2019...2:02 PM

## 2019-04-02 ENCOUNTER — ALLIED HEALTH/NURSE VISIT (OUTPATIENT)
Dept: ALLERGY | Facility: CLINIC | Age: 39
End: 2019-04-02
Payer: COMMERCIAL

## 2019-04-02 DIAGNOSIS — J30.1 ALLERGIC RHINITIS DUE TO POLLEN, UNSPECIFIED SEASONALITY: Primary | ICD-10-CM

## 2019-04-02 PROCEDURE — 95117 IMMUNOTHERAPY INJECTIONS: CPT

## 2019-04-02 PROCEDURE — 99207 ZZC DROP WITH A PROCEDURE: CPT

## 2019-04-02 NOTE — PROGRESS NOTES
Patient presented after waiting 30 minutes with no reaction to allergy injections. Discharged from clinic.    Terri Travis RN

## 2019-05-07 ENCOUNTER — ALLIED HEALTH/NURSE VISIT (OUTPATIENT)
Dept: ALLERGY | Facility: CLINIC | Age: 39
End: 2019-05-07
Payer: COMMERCIAL

## 2019-05-07 DIAGNOSIS — J30.1 SEASONAL ALLERGIC RHINITIS DUE TO POLLEN: Primary | ICD-10-CM

## 2019-05-07 PROCEDURE — 99207 ZZC DROP WITH A PROCEDURE: CPT

## 2019-05-07 PROCEDURE — 95117 IMMUNOTHERAPY INJECTIONS: CPT

## 2019-05-07 NOTE — PROGRESS NOTES
Patient presented after waiting 30 minutes with normal reaction to allergy injections. Discharged from clinic.    Lori Nguyen RN ............   5/7/2019...3:57 PM

## 2019-06-04 ENCOUNTER — ALLIED HEALTH/NURSE VISIT (OUTPATIENT)
Dept: ALLERGY | Facility: CLINIC | Age: 39
End: 2019-06-04
Payer: COMMERCIAL

## 2019-06-04 DIAGNOSIS — J30.1 SEASONAL ALLERGIC RHINITIS DUE TO POLLEN: Primary | ICD-10-CM

## 2019-06-04 PROCEDURE — 95117 IMMUNOTHERAPY INJECTIONS: CPT

## 2019-06-04 PROCEDURE — 99207 ZZC DROP WITH A PROCEDURE: CPT

## 2019-07-09 ENCOUNTER — ALLIED HEALTH/NURSE VISIT (OUTPATIENT)
Dept: ALLERGY | Facility: CLINIC | Age: 39
End: 2019-07-09
Payer: COMMERCIAL

## 2019-07-09 ENCOUNTER — OFFICE VISIT (OUTPATIENT)
Dept: ALLERGY | Facility: CLINIC | Age: 39
End: 2019-07-09
Payer: COMMERCIAL

## 2019-07-09 VITALS
HEART RATE: 80 BPM | DIASTOLIC BLOOD PRESSURE: 78 MMHG | SYSTOLIC BLOOD PRESSURE: 120 MMHG | OXYGEN SATURATION: 100 % | WEIGHT: 145.2 LBS | BODY MASS INDEX: 27.44 KG/M2

## 2019-07-09 DIAGNOSIS — Z51.6 NEED FOR DESENSITIZATION TO ALLERGENS: Primary | ICD-10-CM

## 2019-07-09 DIAGNOSIS — Z91.09 OTHER ALLERGY STATUS, OTHER THAN TO DRUGS AND BIOLOGICAL SUBSTANCES: Primary | ICD-10-CM

## 2019-07-09 PROCEDURE — 99207 ZZC DROP WITH A PROCEDURE: CPT

## 2019-07-09 PROCEDURE — 99213 OFFICE O/P EST LOW 20 MIN: CPT | Mod: 25 | Performed by: ALLERGY & IMMUNOLOGY

## 2019-07-09 PROCEDURE — 95117 IMMUNOTHERAPY INJECTIONS: CPT

## 2019-07-09 RX ORDER — EPINEPHRINE 0.3 MG/.3ML
0.3 INJECTION SUBCUTANEOUS PRN
Qty: 2 ML | Refills: 2 | Status: SHIPPED | OUTPATIENT
Start: 2019-07-09 | End: 2020-06-24

## 2019-07-09 NOTE — PROGRESS NOTES
Gogo Bourgeois was seen in the Allergy Clinic at [facility]. The following are my recommendations regarding her Allergic Rhinitis Due to Pollen, Allergic Rhinitis Due to Mold and Allergic Conjunctivitis    1. Continue allergen immunotherapy per protocol  2. Continue cetirizine 10mg daily as needed  3. Continue fluticasone nasal spray, 1 to 2 sprays in each nostril daily  4. Follow-up in 1 year      Gogo Bourgeois is a 39 year old [place] female who is seen today for follow-up of allergic rhinoconjunctivitis. She began allergen immunotherapy treatment in 2017. She has no history of systemic or significant large local reactions to treatment. Gogo states that this spring and summer she has had several days where her allergy symptoms have felt severe. She reports having itchy eyes, itchy nose, sneezing, itchy throat, and sore throat. These symptoms feel as severe as they did prior to her starting immunotherapy. Cetirizine and fluticasone nasal spray have been helpful and she has not needed to take them on a daily basis. Despite having occasional symptoms Gogo does feel that immunotherapy treatment has been beneficial for her. Her symptoms now occur sporadically rather than last the entire spring or  season.      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    REVIEW OF SYSTEMS:  General: negative for weight gain. negative for weight loss. negative for changes in sleep.   Eyes: positive  for itching. negative for redness. positive  for tearing/watering. negative for vision changes  Ears: negative for fullness. negative for hearing loss. negative for dizziness.   Nose: negative for snoring.negative for changes in smell. negative for drainage. Positive for itching.  Throat: negative for hoarseness. positive  for sore throat. negative for trouble swallowing.   Lungs: negative for cough. negative for shortness of breath.negative for wheezing. negative for sputum production.   Cardiovascular: negative for chest pain. negative for swelling of ankles. negative for fast or irregular heartbeat.   Gastrointestinal: negative for nausea. negative for heartburn. negative for acid reflux.   Musculoskeletal: negative for joint pain. negative for joint stiffness. negative for joint swelling.   Neurologic: negative for seizures. negative for fainting. negative for weakness.   Psychiatric: negative for changes in mood. negative for anxiety.   Endocrine: negative for cold intolerance. negative for heat intolerance. negative for tremors.   Hematologic: negative for easy bruising. negative for easy bleeding.  Integumentary: negative for rash. negative for scaling. negative for nail changes.       Current Outpatient Medications:      cetirizine (ZYRTEC) 10 MG tablet, Take 1-2 tablets (10-20 mg) by mouth daily, Disp: , Rfl:      Multiple Vitamins-Minerals (HAIR SKIN AND NAILS FORMULA) TABS, , Disp: , Rfl:      nitroFURantoin (MACRODANTIN) 50 MG capsule, Take 1 capsule (50 mg) by  mouth daily as needed, Disp: 60 capsule, Rfl: 3     ORDER FOR ALLERGEN IMMUNOTHERAPY, Name of Mix: Mix #1  Mold Alternaria Tenuis 1:10 w/v, HS  0.5 ml Hormodendrum Cladosporioides 1:10 w/v, HS 0.5 ml Diluent: HSA qs to 5ml, Disp: 5 mL, Rfl: PRN     ORDER FOR ALLERGEN IMMUNOTHERAPY, Name of Mix: Mix #2  Tree  Julio Cesar, White 1:20 w/v, HS  0.5 ml Birch Mix PRW 1:20 w/v, HS  0.5 ml Boxelder-Maple Mix BHR (Boxelder Hard Red) 1:20 w/v, HS  0.5 ml [place], Common 1:20 w/v, HS  0.5 ml Elm, American 1:20 w/v, HS  0.5 ml Hackberry 1:20 w/v, HS 0.5 ml Hickory, Shagbark 1:20 w/v, HS  0.5 ml [place] Mix RW 1:20 w/v, HS 0.5 ml [place], Black 1:20 w/v, HS 0.5 ml Diluent: HSA qs to 5ml, Disp: 5 mL, Rfl: PRN     ORDER FOR ALLERGEN IMMUNOTHERAPY, Name of Mix: Mix #3  Weeds Nettle 1:20 w/v, HS 0.5 ml Pigweed, Careless 1:20 w/v, HS 0.5 ml Plantain, English 1:20 w/v, HS 0.5 ml Diluent: HSA qs to 5ml, Disp: 5 mL, Rfl: PRN     Pediatric Multivit-Minerals-C (GUMMY VITAMINS & MINERALS) chewable tablet, Take 1 tablet by mouth daily, Disp: , Rfl:      EPINEPHrine (AUVI-Q) 0.3 MG/0.3ML injection 2-pack, Inject 0.3 mLs (0.3 mg) into the muscle as needed for anaphylaxis (Patient not taking: Reported on 7/9/2019), Disp: 2 mL, Rfl: 2     omeprazole (PRILOSEC) 20 MG CR capsule, Take 1 capsule (20 mg) by mouth daily (Patient not taking: Reported on 7/9/2019), Disp: 90 capsule, Rfl: 3    EXAM:   /78 (BP Location: Left arm, Patient Position: Sitting, Cuff Size: Adult Regular)   Pulse 80   Wt 65.9 kg (145 lb 3.2 oz)   SpO2 100%   BMI 27.44 kg/m    GENERAL APPEARANCE: alert, cooperative and not in distress  SKIN: no rashes, no lesions  HEAD: atraumatic, normocephalic  EYES: lids and lashes normal, conjunctivae and sclerae clear  ENT: no scars or lesions, nasal exam showed no discharge, swelling or lesions noted, tongue midline and normal, soft palate, uvula, and tonsils normal  NECK: no asymmetry, masses, or scars, supple without significant  adenopathy  LUNGS: unlabored respirations, no intercostal retractions or accessory muscle use, clear to auscultation without rales or wheezes  HEART: regular rate and rhythm without murmurs and normal S1 and S2  MUSCULOSKELETAL: no musculoskeletal defects are noted  NEURO: no focal deficits noted  PSYCH: does not appear depressed or anxious      WORKUP:  None    ASSESSMENT/PLAN:  Gogo Bourgeois is a 39 year old female here for follow-up of allergic rhinoconjunctivitis. She has completed nearly 2 years of immunotherapy treatment. Overall she reports her symptoms have improved though she did have several days this spring where here allergy symptoms were quite severe. Gogo continues to take antihistamines and nasal steroid as needed. She was counseled regarding the risks and benefits of continuing allergen immunotherapy treatment and she wishes to proceed at this time.    1. Continue allergen immunotherapy per protocol  2. Continue cetirizine 10mg daily as needed  3. Continue fluticasone nasal spray, 1 to 2 sprays in each nostril daily  4. Follow-up in 1 year      Thank you for allowing me to participate in the care of Gogo Bourgeois.      Zachary Olivas MD  Allergy/Immunology  [place], [place]      Chart documentation done in part with Dragon Voice Recognition Software. Although reviewed after completion, some word and grammatical errors may remain.

## 2019-07-09 NOTE — PROGRESS NOTES
Patient presented after waiting 30 minutes with normal reaction to allergy injections. Discharged from clinic.    Lori Nguyen RN ............   7/9/2019...3:35 PM

## 2019-07-09 NOTE — Clinical Note
2019         RE: Gogo Bourgeois  [address] [place] [address]        Dear Colleague,    Thank you for referring your patient, Gogo Bourgeois, to the [facility]. Please see a copy of my visit note below.    Gogo Bourgeois was seen in the Allergy Clinic at [facility]. The following are my recommendations regarding her {Allergydiagnoses:716375}      Gogo Bourgeois is a 39 year old [place] female who is seen today for follow-up of allergic rhinoconjunctivitis. She began allergen immunotherapy treatment in 2017. She has no history of systemic or significant large local reactions to treatment. Gogo states that this spring and summer she has had several days where her allergy symptoms have felt severe. She reports having itchy eyes, itchy nose, sneezing, itchy throat, and sore throat. These symptoms feel as severe as they did prior to her starting immunotherapy. Cetirizine and fluticasone nasal spray have been helpful and she has not needed to take them on a daily basis. Despite having occasional symptoms Gogo does feel that immunotherapy treatment has been beneficial for her. Her symptoms now occur sporadically rather than last the entire spring or  season.      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    REVIEW OF SYSTEMS:  General: negative for weight gain. negative for weight loss. negative for changes in sleep.   Eyes: positive  for itching. negative for redness. positive  for tearing/watering. negative for vision changes  Ears: negative for fullness. negative for hearing loss. negative for dizziness.   Nose: negative for snoring.negative for changes in smell. negative for drainage. Positive for itching.  Throat: negative for hoarseness. positive  for sore throat. negative for trouble swallowing.   Lungs: negative for cough. negative for shortness of breath.negative for wheezing. negative for sputum production.   Cardiovascular: negative for chest pain. negative for swelling of ankles. negative for fast or irregular heartbeat.   Gastrointestinal: negative for nausea. negative for heartburn. negative for acid reflux.   Musculoskeletal: negative for joint pain. negative for joint stiffness. negative for joint swelling.   Neurologic: negative for seizures. negative for fainting. negative for weakness.   Psychiatric: negative for changes in mood. negative for anxiety.   Endocrine: negative for cold intolerance. negative for heat intolerance. negative for tremors.   Hematologic: negative for easy bruising. negative for easy bleeding.  Integumentary: negative for rash. negative for scaling. negative for nail changes.       Current Outpatient Medications:      cetirizine (ZYRTEC) 10 MG tablet, Take 1-2 tablets (10-20 mg) by mouth daily, Disp: , Rfl:      Multiple Vitamins-Minerals (HAIR SKIN AND NAILS FORMULA) TABS, , Disp: , Rfl:      nitroFURantoin (MACRODANTIN) 50 MG capsule, Take 1 capsule (50 mg) by mouth daily as needed,  Disp: 60 capsule, Rfl: 3     ORDER FOR ALLERGEN IMMUNOTHERAPY, Name of Mix: Mix #1  Mold Alternaria Tenuis 1:10 w/v, HS  0.5 ml Hormodendrum Cladosporioides 1:10 w/v, HS 0.5 ml Diluent: HSA qs to 5ml, Disp: 5 mL, Rfl: PRN     ORDER FOR ALLERGEN IMMUNOTHERAPY, Name of Mix: Mix #2  Tree  Julio Cesar, White 1:20 w/v, HS  0.5 ml Birch Mix PRW 1:20 w/v, HS  0.5 ml Boxelder-Maple Mix BHR (Boxelder Hard Red) 1:20 w/v, HS  0.5 ml [place], Common 1:20 w/v, HS  0.5 ml Elm, American 1:20 w/v, HS  0.5 ml Hackberry 1:20 w/v, HS 0.5 ml Hickory, Shagbark 1:20 w/v, HS  0.5 ml [place] Mix RW 1:20 w/v, HS 0.5 ml [place], Black 1:20 w/v, HS 0.5 ml Diluent: HSA qs to 5ml, Disp: 5 mL, Rfl: PRN     ORDER FOR ALLERGEN IMMUNOTHERAPY, Name of Mix: Mix #3  Weeds Nettle 1:20 w/v, HS 0.5 ml Pigweed, Careless 1:20 w/v, HS 0.5 ml Plantain, English 1:20 w/v, HS 0.5 ml Diluent: HSA qs to 5ml, Disp: 5 mL, Rfl: PRN     Pediatric Multivit-Minerals-C (GUMMY VITAMINS & MINERALS) chewable tablet, Take 1 tablet by mouth daily, Disp: , Rfl:      EPINEPHrine (AUVI-Q) 0.3 MG/0.3ML injection 2-pack, Inject 0.3 mLs (0.3 mg) into the muscle as needed for anaphylaxis (Patient not taking: Reported on 7/9/2019), Disp: 2 mL, Rfl: 2     omeprazole (PRILOSEC) 20 MG CR capsule, Take 1 capsule (20 mg) by mouth daily (Patient not taking: Reported on 7/9/2019), Disp: 90 capsule, Rfl: 3    EXAM:   /78 (BP Location: Left arm, Patient Position: Sitting, Cuff Size: Adult Regular)   Pulse 80   Wt 65.9 kg (145 lb 3.2 oz)   SpO2 100%   BMI 27.44 kg/m     GENERAL APPEARANCE: { :778010}  SKIN: {SKIN:496754}  HEAD: {EXAM NCC CONST HEAD:799548}  EYES: { :993706}  ENT: { :651623}  NECK: { :600208}  LUNGS: { :717405}  HEART: { :488255}  ABDOMEN: { :721348}  MUSCULOSKELETAL: { :682074}  NEURO: { :302864}  PSYCH: { :091858}      WORKUP:  {ALLERGYWORKUP:311750}    ASSESSMENT/PLAN:  Gogo Bourgeois is a 39 year old female    ***      Thank you for allowing me to participate in the  care of Gogo Bourgeois.      Zachary Olivas MD  Allergy/Immunology  [facility] and [place], [place]      Chart documentation done in part with Dragon Voice Recognition Software. Although reviewed after completion, some word and grammatical errors may remain.    Again, thank you for allowing me to participate in the care of your patient.        Sincerely,        Zachary Olivas MD

## 2019-08-13 ENCOUNTER — TELEPHONE (OUTPATIENT)
Dept: UROLOGY | Facility: CLINIC | Age: 39
End: 2019-08-13

## 2019-08-13 ENCOUNTER — ALLIED HEALTH/NURSE VISIT (OUTPATIENT)
Dept: ALLERGY | Facility: CLINIC | Age: 39
End: 2019-08-13
Payer: COMMERCIAL

## 2019-08-13 DIAGNOSIS — Z51.6 NEED FOR DESENSITIZATION TO ALLERGENS: Primary | ICD-10-CM

## 2019-08-13 PROCEDURE — 99207 ZZC DROP WITH A PROCEDURE: CPT

## 2019-08-13 PROCEDURE — 95117 IMMUNOTHERAPY INJECTIONS: CPT

## 2019-08-13 RX ORDER — NITROFURANTOIN MACROCRYSTALS 50 MG/1
50 CAPSULE ORAL DAILY PRN
Qty: 60 CAPSULE | Refills: 3 | Status: CANCELLED | OUTPATIENT
Start: 2019-08-13

## 2019-08-13 NOTE — PROGRESS NOTES
Patient presented after waiting 30 minutes with normal reaction to allergy injections. Discharged from clinic.    Patient had 30 mm wheal/flare at left upper arm that was pruritic.     The following medication was given:     MEDICATION:Cetirizine  ROUTE: PO  SITE: mouth  DOSE: 10mg  LOT #: [id number]  :  Sun-eee  EXPIRATION DATE:  04/20       Lori Nguyen RN ............   8/13/2019...3:58 PM

## 2019-08-15 NOTE — TELEPHONE ENCOUNTER
Called patient and advised that she still has additional refills left at pharmacy.  RN confirmed this with the pharmacy.  Patient verbalized understanding.    Casper Marks RN....8/15/2019 2:56 PM

## 2019-10-08 ENCOUNTER — ALLIED HEALTH/NURSE VISIT (OUTPATIENT)
Dept: ALLERGY | Facility: CLINIC | Age: 39
End: 2019-10-08
Payer: COMMERCIAL

## 2019-10-08 DIAGNOSIS — Z51.6 NEED FOR DESENSITIZATION TO ALLERGENS: Primary | ICD-10-CM

## 2019-10-08 PROCEDURE — 95117 IMMUNOTHERAPY INJECTIONS: CPT

## 2019-10-08 PROCEDURE — 99207 ZZC DROP WITH A PROCEDURE: CPT

## 2019-10-08 NOTE — PROGRESS NOTES
Patient presented after waiting 30 minutes with normal reaction to allergy injections. Discharged from clinic.    Lori Nguyen RN, RN ............   10/8/2019...1:32 PM

## 2019-10-15 ENCOUNTER — ALLIED HEALTH/NURSE VISIT (OUTPATIENT)
Dept: ALLERGY | Facility: CLINIC | Age: 39
End: 2019-10-15
Payer: COMMERCIAL

## 2019-10-15 DIAGNOSIS — Z51.6 NEED FOR DESENSITIZATION TO ALLERGENS: Primary | ICD-10-CM

## 2019-10-15 PROCEDURE — 95117 IMMUNOTHERAPY INJECTIONS: CPT

## 2019-10-15 PROCEDURE — 99207 ZZC DROP WITH A PROCEDURE: CPT

## 2019-10-15 NOTE — PROGRESS NOTES
Patient presented after waiting 30 minutes with normal reaction to allergy injections. Discharged from clinic.    Lori Nguyen RN, RN ............   10/15/2019...3:14 PM

## 2019-11-12 ENCOUNTER — ALLIED HEALTH/NURSE VISIT (OUTPATIENT)
Dept: ALLERGY | Facility: CLINIC | Age: 39
End: 2019-11-12
Payer: COMMERCIAL

## 2019-11-12 DIAGNOSIS — Z51.6 NEED FOR DESENSITIZATION TO ALLERGENS: Primary | ICD-10-CM

## 2019-11-12 DIAGNOSIS — J30.1 CHRONIC SEASONAL ALLERGIC RHINITIS DUE TO POLLEN: ICD-10-CM

## 2019-11-12 DIAGNOSIS — J30.89 ALLERGIC RHINITIS DUE TO MOLD: ICD-10-CM

## 2019-11-12 PROCEDURE — 99207 ZZC DROP WITH A PROCEDURE: CPT

## 2019-11-12 PROCEDURE — 95117 IMMUNOTHERAPY INJECTIONS: CPT

## 2019-11-12 NOTE — TELEPHONE ENCOUNTER
ALLERGY SOLUTION RE-ORDER REQUEST    Gogo Bourgeois 1980 MRN: [id number]    DATE NEEDED:  11/26/19   Vial Color Content   Top Dose   Last Dose Vial Size  Red 1:1 Trees   Red 1:1 0.5   Red 1:10.5 5ml  Red 1:1 Molds   Red 1:1 0.5   Red 1:10.5 5ml  Red 1:1 Weeds   Red 1:1 0.5   Red 1:10.5 5ml        Serum reorder consent signed and patient/parent was advised that new serums would be ordered through the pharmacy and billed to their insurance company when they arrive in clinic. Yes    Shot Clinic Location:  [place]  Ship to Location: [place]  Serum billed to:  [place]    Special Instructions:  N/A       Updated Prescription Needed: No      Requester Signature  Phoebe Morejon MA

## 2019-11-12 NOTE — PROGRESS NOTES
Patient presented after waiting 30 minutes with normal reaction to allergy injections. Discharged from clinic.    Lori Nguyen RN, RN ............   11/12/2019...1:55 PM

## 2019-11-25 DIAGNOSIS — Z51.6 NEED FOR DESENSITIZATION TO ALLERGENS: Primary | ICD-10-CM

## 2019-11-25 PROCEDURE — 95165 ANTIGEN THERAPY SERVICES: CPT | Performed by: ALLERGY & IMMUNOLOGY

## 2019-11-25 NOTE — PROGRESS NOTES
Allergy serums billed at [place].     Vials billed below:    Vial Color Content                      Vial Size Expiration Date    Red 1:1 Molds 5mL  11/18/2020  Red 1:1 Weeds 5mL  11/18/2020  Red 1:1 Molds 5mL  11/18/2020      Signature  Phoebe Morejon MA

## 2019-11-25 NOTE — TELEPHONE ENCOUNTER
Allergy serums received at [place].     Vials received below:    Vial Color Content                      Vial Size Expiration Date  Red 1:1 Molds 5mL  11/18/2020  Red 1:1 Weeds 5mL  11/18/2020  Red 1:1 Molds 5mL  11/18/2020        Signature  Phoebe Morejon MA

## 2020-01-07 ENCOUNTER — ALLIED HEALTH/NURSE VISIT (OUTPATIENT)
Dept: ALLERGY | Facility: CLINIC | Age: 40
End: 2020-01-07
Payer: COMMERCIAL

## 2020-01-07 DIAGNOSIS — Z51.6 NEED FOR DESENSITIZATION TO ALLERGENS: Primary | ICD-10-CM

## 2020-01-07 PROCEDURE — 99207 ZZC DROP WITH A PROCEDURE: CPT

## 2020-01-07 PROCEDURE — 95117 IMMUNOTHERAPY INJECTIONS: CPT

## 2020-01-07 NOTE — PROGRESS NOTES
Patient presented after waiting 30 minutes with normal reaction to allergy injections. Discharged from clinic.    Lori Nguyen RN, RN ............   1/7/2020...3:05 PM

## 2020-01-14 ENCOUNTER — ALLIED HEALTH/NURSE VISIT (OUTPATIENT)
Dept: ALLERGY | Facility: CLINIC | Age: 40
End: 2020-01-14
Payer: COMMERCIAL

## 2020-01-14 DIAGNOSIS — Z51.6 NEED FOR DESENSITIZATION TO ALLERGENS: Primary | ICD-10-CM

## 2020-01-14 PROCEDURE — 99207 ZZC DROP WITH A PROCEDURE: CPT

## 2020-01-14 PROCEDURE — 95117 IMMUNOTHERAPY INJECTIONS: CPT

## 2020-01-14 NOTE — PROGRESS NOTES
Patient presented after waiting 30 minutes with normal reaction to allergy injections. Discharged from clinic.    Lori Nguyen RN, RN ............   1/14/2020...4:09 PM

## 2020-01-20 ENCOUNTER — ALLIED HEALTH/NURSE VISIT (OUTPATIENT)
Dept: ALLERGY | Facility: CLINIC | Age: 40
End: 2020-01-20
Payer: COMMERCIAL

## 2020-01-20 DIAGNOSIS — Z51.6 NEED FOR DESENSITIZATION TO ALLERGENS: Primary | ICD-10-CM

## 2020-01-20 PROCEDURE — 99207 ZZC DROP WITH A PROCEDURE: CPT

## 2020-01-20 PROCEDURE — 95117 IMMUNOTHERAPY INJECTIONS: CPT

## 2020-01-20 NOTE — PROGRESS NOTES
Patient presented after waiting 30 minutes with normal reaction to allergy injections. Discharged from clinic.    Lori Nguyen RN, RN ............   1/20/2020...11:38 AM

## 2020-01-28 ENCOUNTER — ALLIED HEALTH/NURSE VISIT (OUTPATIENT)
Dept: ALLERGY | Facility: CLINIC | Age: 40
End: 2020-01-28
Payer: COMMERCIAL

## 2020-01-28 DIAGNOSIS — J30.1 SEASONAL ALLERGIC RHINITIS DUE TO POLLEN: Primary | ICD-10-CM

## 2020-01-28 PROCEDURE — 99207 ZZC DROP WITH A PROCEDURE: CPT

## 2020-01-28 PROCEDURE — 95117 IMMUNOTHERAPY INJECTIONS: CPT

## 2020-02-24 ENCOUNTER — HEALTH MAINTENANCE LETTER (OUTPATIENT)
Age: 40
End: 2020-02-24

## 2020-02-24 ENCOUNTER — ALLIED HEALTH/NURSE VISIT (OUTPATIENT)
Dept: ALLERGY | Facility: CLINIC | Age: 40
End: 2020-02-24
Payer: COMMERCIAL

## 2020-02-24 DIAGNOSIS — J30.1 ALLERGIC RHINITIS DUE TO POLLEN, UNSPECIFIED SEASONALITY: Primary | ICD-10-CM

## 2020-02-24 PROCEDURE — 99207 ZZC DROP WITH A PROCEDURE: CPT

## 2020-02-24 PROCEDURE — 95117 IMMUNOTHERAPY INJECTIONS: CPT

## 2020-02-24 NOTE — PROGRESS NOTES
Patient presented after waiting 30 minutes with normal reaction to allergy injections. Discharged from clinic.    Lori Nguyen RN, RN ............   2/24/2020...4:51 PM

## 2020-03-24 ENCOUNTER — TELEPHONE (OUTPATIENT)
Dept: ALLERGY | Facility: CLINIC | Age: 40
End: 2020-03-24

## 2020-03-24 NOTE — TELEPHONE ENCOUNTER
Yes, typically we can transfer allergy shot patients to the [facility] in [place]. However, at this time we are holding allergy shots for all patients. We will reach out to patients when we are able to resume scheduling these appointments.

## 2020-03-24 NOTE — TELEPHONE ENCOUNTER
Received call from patient. Patient reporting that at last office visit Dr. Olivas had mentioned that she would be able to receive her allergy shots at a clinic in [place]. Routing to Dr. Olivas for suggestions on what clinics could be an option and will return call to patient.     Terri Travis RN

## 2020-03-25 NOTE — TELEPHONE ENCOUNTER
Spoke with patient today. Advised her that the clinic in [place] is a [facility] clinic. I did advise patient that currently due to the COVID-19 pandemic [facility], which includes the [facility], have made the decision to stop allergy shots until further notice. Patient advised that we could still transfer her serums to [place] for her to receive her injections there, once allergy shots do resume. Patient would like to do this.     Patient would like to know if she will need to have an OV with a provider at the [facility] in order to receive her injections?     Ana Chakraborty MA

## 2020-03-25 NOTE — TELEPHONE ENCOUNTER
Called patient to notify of need for follow up in July. Patient will schedule this in the future. I advised patient that we will contact her when we are able to resume allergy injections, and transfer her serums to the [facility] at that time, as there are currently no staff there to receive the serums if we send them now.

## 2020-05-14 ENCOUNTER — TELEPHONE (OUTPATIENT)
Dept: ALLERGY | Facility: CLINIC | Age: 40
End: 2020-05-14

## 2020-05-14 NOTE — TELEPHONE ENCOUNTER
Please advise new dosing orders, building schedule, and date which orders are valid through.  Patient's last shot was 02/24/20, dose was 0.5 red, next appointment scheduled for 05/27/20 which will be 93 days.     New orders will be added to immunotherapy flowsheet once they are received.

## 2020-05-27 ENCOUNTER — ALLIED HEALTH/NURSE VISIT (OUTPATIENT)
Dept: ALLERGY | Facility: CLINIC | Age: 40
End: 2020-05-27
Payer: COMMERCIAL

## 2020-05-27 DIAGNOSIS — Z51.6 NEED FOR DESENSITIZATION TO ALLERGENS: Primary | ICD-10-CM

## 2020-05-27 PROCEDURE — 99207 ZZC DROP WITH A PROCEDURE: CPT

## 2020-05-27 PROCEDURE — 95117 IMMUNOTHERAPY INJECTIONS: CPT

## 2020-05-27 NOTE — PROGRESS NOTES
Immunotherapy 2020   Interval from last injection (days): 27   Has the vial ? No   Reaction with last injection?(historical)    Did you have a reaction after the last visit? No   Are you ill today? No   Asthma exacerbation or symptoms No   Do you have a fever today? No   Are you pregnant? (historical)    Are you pregnant? No   Are you on any beta blockers? No   Does the patient need to be pre-medicated? No   Please explain the pre-medication regimen    Does patient have their Epi Pen today that is not ?   Yes   Serum #1 Antigen(s) T   Expiration Date #1 2020   Vial Concentration #1 1:1 (Red)   Dose (mL) #1 0.5   Location #1 [facility]   Antigen Top Dose #1 0.5   Comment #1 red   Given By    Verified By te   Add Additional Sera?    Serum #2 Antigen(s) M   Expiration Date #2 2020   Vial Concentration #2 1:1 (Red)   Dose (mL)#2 0.5   Location #2 [facility]   Antigen Top Dose #2 0.5   Comment #2 red   Given By    Verified By te   Serum #3 Antigen(s) W   Expiration Date #3 2020   Vial Concentration #3 1:1 (Red)   Dose (mL) #3 0.5   Location #3 [facility]   Antigen Top Dose #3 0.5   Comment #3 red   Given By    Verified By te

## 2020-06-23 ENCOUNTER — TELEPHONE (OUTPATIENT)
Dept: ALLERGY | Facility: CLINIC | Age: 40
End: 2020-06-23

## 2020-06-23 NOTE — TELEPHONE ENCOUNTER
Please advise new dosing orders, building schedule, and date which orders are valid through.  Patient's last shot was 05-, dose was 0.2ml red, next appointment scheduled for 06- which will be 28 days.    Should patient build or repeat prior dose?     New orders will be added to immunotherapy flowsheet once they are received.     Conchita Ny RN

## 2020-06-24 ENCOUNTER — ALLIED HEALTH/NURSE VISIT (OUTPATIENT)
Dept: ALLERGY | Facility: CLINIC | Age: 40
End: 2020-06-24
Payer: COMMERCIAL

## 2020-06-24 ENCOUNTER — TELEPHONE (OUTPATIENT)
Dept: ALLERGY | Facility: CLINIC | Age: 40
End: 2020-06-24

## 2020-06-24 DIAGNOSIS — Z51.6 NEED FOR DESENSITIZATION TO ALLERGENS: Primary | ICD-10-CM

## 2020-06-24 DIAGNOSIS — Z51.6 NEED FOR DESENSITIZATION TO ALLERGENS: ICD-10-CM

## 2020-06-24 PROCEDURE — 99207 ZZC DROP WITH A PROCEDURE: CPT

## 2020-06-24 PROCEDURE — 95117 IMMUNOTHERAPY INJECTIONS: CPT

## 2020-06-24 RX ORDER — EPINEPHRINE 0.3 MG/.3ML
0.3 INJECTION SUBCUTANEOUS PRN
Qty: 2 EACH | Refills: 0 | Status: SHIPPED | OUTPATIENT
Start: 2020-06-24 | End: 2022-03-28

## 2020-06-24 NOTE — TELEPHONE ENCOUNTER
Signed Prescriptions:                        Disp   Refills    EPINEPHrine (AUVI-Q) 0.3 MG/0.3ML injectio*2 each 0        Sig: Inject 0.3 mLs (0.3 mg) into the muscle as needed for           anaphylaxis  Authorizing Provider: KURTIS LANDERS  Ordering User: SCOTT ORDONEZ RN refilled medication per G Refill Protocol.     Scott Ordonez RN

## 2020-07-16 ENCOUNTER — ALLIED HEALTH/NURSE VISIT (OUTPATIENT)
Dept: ALLERGY | Facility: CLINIC | Age: 40
End: 2020-07-16
Payer: COMMERCIAL

## 2020-07-16 ENCOUNTER — OFFICE VISIT (OUTPATIENT)
Dept: ALLERGY | Facility: CLINIC | Age: 40
End: 2020-07-16
Payer: COMMERCIAL

## 2020-07-16 VITALS
OXYGEN SATURATION: 96 % | BODY MASS INDEX: 26.66 KG/M2 | WEIGHT: 141.1 LBS | DIASTOLIC BLOOD PRESSURE: 80 MMHG | SYSTOLIC BLOOD PRESSURE: 126 MMHG | HEART RATE: 92 BPM

## 2020-07-16 DIAGNOSIS — Z51.6 NEED FOR DESENSITIZATION TO ALLERGENS: Primary | ICD-10-CM

## 2020-07-16 DIAGNOSIS — Z91.09 OTHER ALLERGY STATUS, OTHER THAN TO DRUGS AND BIOLOGICAL SUBSTANCES: Primary | ICD-10-CM

## 2020-07-16 PROCEDURE — 99213 OFFICE O/P EST LOW 20 MIN: CPT | Mod: 25 | Performed by: ALLERGY & IMMUNOLOGY

## 2020-07-16 PROCEDURE — 99207 ZZC DROP WITH A PROCEDURE: CPT

## 2020-07-16 PROCEDURE — 95117 IMMUNOTHERAPY INJECTIONS: CPT

## 2020-07-16 NOTE — PROGRESS NOTES
Gogo Bourgeois was seen in the Allergy Clinic at [facility].      Gogo Bourgeois is a 40 year old Iraqi female who is seen today for follow-up of allergic rhinoconjunctivitis. She began allergen immunotherapy treatment in 2017 and reached her maintenance dose in 2018. She has not had any systemic or significant large local reactions to treatment. Overall Gogo feels that her rhinoconjunctivitis symptoms have improved since she began immunotherapy treatment. She does continue to take antihistamines in the spring and fall however her symptoms are far less intense than they have been in the past. Gogo states that she has had some pain and discomfort in her arms after receiving her injections. She feels this may be due to variations in technique amongst those administering the injections as well as scar tissue from previous surgeries.      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 The home is heated with a electric furnace. They do have central air conditioning. The patient's bedroom is furnished with carpeting in bedroom.  Pets inside the house include None. There is no history of cockroach or mice infestation. There is/are 0 smokers in the house.  The house does not have a damp basement.          SOCIAL HISTORY:     Gogo is employed as a . She lives with her 2 sons, 3 daughters and boyfriend.  Her boyfriend works in IT.        Past medical, family, and social history were reviewed.    REVIEW OF SYSTEMS:  General: negative for weight gain. negative for weight loss. negative for changes in sleep.   Eyes: negative for itching. negative for redness. negative for tearing/watering. negative for vision changes  Ears: negative for fullness. negative for hearing loss. negative for dizziness.   Nose: negative for snoring.negative for changes in smell. negative for drainage.   Throat: negative for hoarseness. negative for sore throat. negative for trouble swallowing.   Lungs: negative for cough. negative for shortness of breath.negative for wheezing. negative for sputum production.   Cardiovascular: negative for chest pain. negative for swelling of ankles. negative for fast or irregular heartbeat.   Gastrointestinal: negative for nausea. negative for heartburn. negative for acid reflux.   Musculoskeletal: negative for joint pain. negative for joint stiffness. negative for joint swelling.   Neurologic: negative for seizures. negative for fainting. negative for weakness.   Psychiatric: negative for changes in mood. negative for anxiety.   Endocrine: negative for cold intolerance. negative for heat intolerance. negative for tremors.   Hematologic: negative for easy bruising. negative for easy bleeding.  Integumentary: negative for rash. negative for scaling. negative for nail changes.       Current Outpatient Medications:      cetirizine (ZYRTEC) 10 MG tablet, Take 1-2 tablets  (10-20 mg) by mouth daily, Disp: , Rfl:      EPINEPHrine (AUVI-Q) 0.3 MG/0.3ML injection 2-pack, Inject 0.3 mLs (0.3 mg) into the muscle as needed for anaphylaxis, Disp: 2 each, Rfl: 0     Multiple Vitamins-Minerals (HAIR SKIN AND NAILS FORMULA) TABS, , Disp: , Rfl:      nitroFURantoin (MACRODANTIN) 50 MG capsule, Take 1 capsule (50 mg) by mouth daily as needed, Disp: 60 capsule, Rfl: 3     ORDER FOR ALLERGEN IMMUNOTHERAPY, Name of Mix: Mix #1  Mold Alternaria Tenuis 1:10 w/v, HS  0.5 ml Hormodendrum Cladosporioides 1:10 w/v, HS 0.5 ml Diluent: HSA qs to 5ml, Disp: 5 mL, Rfl: PRN     ORDER FOR ALLERGEN IMMUNOTHERAPY, Name of Mix: Mix #2  Tree  Julio Cesar, White 1:20 w/v, HS  0.5 ml Birch Mix PRW 1:20 w/v, HS  0.5 ml Boxelder-Maple Mix BHR (Boxelder Hard Red) 1:20 w/v, HS  0.5 ml [place], Common 1:20 w/v, HS  0.5 ml Elm, American 1:20 w/v, HS  0.5 ml Hackberry 1:20 w/v, HS 0.5 ml Hickory, Shagbark 1:20 w/v, HS  0.5 ml [place] Mix RW 1:20 w/v, HS 0.5 ml Sparkman, Black 1:20 w/v, HS 0.5 ml Diluent: HSA qs to 5ml, Disp: 5 mL, Rfl: PRN     ORDER FOR ALLERGEN IMMUNOTHERAPY, Name of Mix: Mix #3  Weeds Nettle 1:20 w/v, HS 0.5 ml Pigweed, Careless 1:20 w/v, HS 0.5 ml Plantain, English 1:20 w/v, HS 0.5 ml Diluent: HSA qs to 5ml, Disp: 5 mL, Rfl: PRN     Pediatric Multivit-Minerals-C (GUMMY VITAMINS & MINERALS) chewable tablet, Take 1 tablet by mouth daily, Disp: , Rfl:      omeprazole (PRILOSEC) 20 MG CR capsule, Take 1 capsule (20 mg) by mouth daily (Patient not taking: Reported on 7/9/2019), Disp: 90 capsule, Rfl: 3    EXAM:   /80 (BP Location: Right arm, Patient Position: Sitting, Cuff Size: Adult Regular)   Pulse 92   Wt 64 kg (141 lb 1.6 oz)   SpO2 96%   BMI 26.66 kg/m    GENERAL APPEARANCE: alert, cooperative and not in distress  SKIN: no rashes, no lesions  HEAD: atraumatic, normocephalic  EYES: lids and lashes normal, conjunctivae and sclerae clear, EOM full and intact  ENT: no scars or lesions, nasal exam showed  no discharge, swelling or lesions noted, tongue midline and normal, soft palate, uvula, and tonsils normal  NECK: no asymmetry, masses, or scars, supple without significant adenopathy  LUNGS: unlabored respirations, no intercostal retractions or accessory muscle use, clear to auscultation without rales or wheezes  HEART: regular rate and rhythm without murmurs and normal S1 and S2  MUSCULOSKELETAL: no musculoskeletal defects are noted  NEURO: no focal deficits noted  PSYCH: does not appear depressed or anxious      WORKUP:  None    ASSESSMENT/PLAN:  Gogo Bourgeois is a 40 year old female seen for follow-up of allergic rhinoconjunctivitis.    1. Other allergy status, other than to drugs and biological substances - Gogo has completed 2 years of immunotherapy treatment at her maintenance dose. She still has symptoms in the spring and fall months though overall she feels immunotherapy has significantly improved her symptoms. She was counseled regarding the risks, benefits, and recommended duration of immunotherapy treatment and wishes to continue at this time.    - continue allergen immunotherapy per protocol  - epinephrine auto-injector no longer required for continued immunotherapy treatment  - apply ice to arms prior to receiving immunotherapy injections to help reduce discomfort  - continue cetirizine 10mg daily as needed  - continue fluticasone nasal spray, 1-2 sprays in each nostril daily as needed  - follow-up in 1 year      Thank you for allowing me to participate in the care of Gogo Bourgeois.      Zachary Olivas MD, FAAAAI  Allergy/Immunology  [facility]      Chart documentation done in part with Dragon Voice Recognition Software. Although reviewed after completion, some word and grammatical errors may remain.

## 2020-07-16 NOTE — Clinical Note
"    2020         RE: Gogo Bourgeois  [address] [facility] [address]        Dear Colleague,    Thank you for referring your patient, Gogo Bourgeois, to the [facility]. Please see a copy of my visit note below.    Gogo Bourgeois was seen in the Allergy Clinic at [id number]      Gogo Bourgeois is a 40 year old Mold[place] female who is seen today for follow-up of allergic rhinoconjunctivitis. 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    REVIEW OF SYSTEMS:  General: {POSITIVE/NEGATIVE:235025::\"negative\"} for weight gain. {POSITIVE/NEGATIVE:675109::\"negative\"} for weight loss. {POSITIVE/NEGATIVE:254424::\"negative\"} for changes in sleep.   Eyes: {POSITIVE/NEGATIVE:593622::\"negative\"} for itching. {POSITIVE/NEGATIVE:072748::\"negative\"} for redness. " "{POSITIVE/NEGATIVE:661857::\"negative\"} for tearing/watering. {POSITIVE/NEGATIVE:100456::\"negative\"} for vision changes  Ears: {POSITIVE/NEGATIVE:066881::\"negative\"} for fullness. {POSITIVE/NEGATIVE:326646::\"negative\"} for hearing loss. {POSITIVE/NEGATIVE:990805::\"negative\"} for dizziness.   Nose: {POSITIVE/NEGATIVE:704569::\"negative\"} for snoring.{POSITIVE/NEGATIVE:922535::\"negative\"} for changes in smell. {POSITIVE/NEGATIVE:827159::\"negative\"} for drainage.   Throat: {POSITIVE/NEGATIVE:712519::\"negative\"} for hoarseness. {POSITIVE/NEGATIVE:042852::\"negative\"} for sore throat. {POSITIVE/NEGATIVE:786693::\"negative\"} for trouble swallowing.   Lungs: {POSITIVE/NEGATIVE:388003::\"negative\"} for cough. {POSITIVE/NEGATIVE:067385::\"negative\"} for shortness of breath.{POSITIVE/NEGATIVE:462752::\"negative\"} for wheezing. {POSITIVE/NEGATIVE:050556::\"negative\"} for sputum production.   Cardiovascular: {POSITIVE/NEGATIVE:598883::\"negative\"} for chest pain. {POSITIVE/NEGATIVE:370667::\"negative\"} for swelling of ankles. {POSITIVE/NEGATIVE:486890::\"negative\"} for fast or irregular heartbeat.   Gastrointestinal: {POSITIVE/NEGATIVE:551448::\"negative\"} for nausea. {POSITIVE/NEGATIVE:833403::\"negative\"} for heartburn. {POSITIVE/NEGATIVE:786007::\"negative\"} for acid reflux.   Musculoskeletal: {POSITIVE/NEGATIVE:105404::\"negative\"} for joint pain. {POSITIVE/NEGATIVE:998775::\"negative\"} for joint stiffness. {POSITIVE/NEGATIVE:648991::\"negative\"} for joint swelling.   Neurologic: {POSITIVE/NEGATIVE:715364::\"negative\"} for seizures. {POSITIVE/NEGATIVE:709188::\"negative\"} for fainting. {POSITIVE/NEGATIVE:596770::\"negative\"} for weakness.   Psychiatric: {POSITIVE/NEGATIVE:448678::\"negative\"} for changes in mood. {POSITIVE/NEGATIVE:255114::\"negative\"} for anxiety.   Endocrine: {POSITIVE/NEGATIVE:922544::\"negative\"} for cold intolerance. {POSITIVE/NEGATIVE:826071::\"negative\"} for heat intolerance. {POSITIVE/NEGATIVE:040013::\"negative\"} for " "tremors.   Hematologic: {POSITIVE/NEGATIVE:119753::\"negative\"} for easy bruising. {POSITIVE/NEGATIVE:056323::\"negative\"} for easy bleeding.  Integumentary: {POSITIVE/NEGATIVE:472680::\"negative\"} for rash. {POSITIVE/NEGATIVE:082628::\"negative\"} for scaling. {POSITIVE/NEGATIVE:638735::\"negative\"} for nail changes.       Current Outpatient Medications:      cetirizine (ZYRTEC) 10 MG tablet, Take 1-2 tablets (10-20 mg) by mouth daily, Disp: , Rfl:      EPINEPHrine (AUVI-Q) 0.3 MG/0.3ML injection 2-pack, Inject 0.3 mLs (0.3 mg) into the muscle as needed for anaphylaxis, Disp: 2 each, Rfl: 0     Multiple Vitamins-Minerals (HAIR SKIN AND NAILS FORMULA) TABS, , Disp: , Rfl:      nitroFURantoin (MACRODANTIN) 50 MG capsule, Take 1 capsule (50 mg) by mouth daily as needed, Disp: 60 capsule, Rfl: 3     omeprazole (PRILOSEC) 20 MG CR capsule, Take 1 capsule (20 mg) by mouth daily (Patient not taking: Reported on 7/9/2019), Disp: 90 capsule, Rfl: 3     ORDER FOR ALLERGEN IMMUNOTHERAPY, Name of Mix: Mix #1  Mold Alternaria Tenuis 1:10 w/v, HS  0.5 ml Hormodendrum Cladosporioides 1:10 w/v, HS 0.5 ml Diluent: HSA qs to 5ml, Disp: 5 mL, Rfl: PRN     ORDER FOR ALLERGEN IMMUNOTHERAPY, Name of Mix: Mix #2  Tree  Julio Cesar, White 1:20 w/v, HS  0.5 ml Birch Mix PRW 1:20 w/v, HS  0.5 ml Boxelder-Maple Mix BHR (Boxelder Hard Red) 1:20 w/v, HS  0.5 ml [place], Common 1:20 w/v, HS  0.5 ml Elm, American 1:20 w/v, HS  0.5 ml Hackberry 1:20 w/v, HS 0.5 ml Hickory, Shagbark 1:20 w/v, HS  0.5 ml [place] Mix RW 1:20 w/v, HS 0.5 ml [place], Black 1:20 w/v, HS 0.5 ml Diluent: HSA qs to 5ml, Disp: 5 mL, Rfl: PRN     ORDER FOR ALLERGEN IMMUNOTHERAPY, Name of Mix: Mix #3  Weeds Nettle 1:20 w/v, HS 0.5 ml Pigweed, Careless 1:20 w/v, HS 0.5 ml Plantain, English 1:20 w/v, HS 0.5 ml Diluent: HSA qs to 5ml, Disp: 5 mL, Rfl: PRN     Pediatric Multivit-Minerals-C (GUMMY VITAMINS & MINERALS) chewable tablet, Take 1 tablet by mouth daily, Disp: , Rfl:     EXAM: "   There were no vitals taken for this visit.  GENERAL APPEARANCE: { :693394}  SKIN: {SKIN:498925}  HEAD: {EXAM NCC CONST HEAD:854599}  EYES: { :098642}  ENT: { :929160}  NECK: { :416278}  LUNGS: { :512038}  HEART: { :591541}  ABDOMEN: { :385174}  MUSCULOSKELETAL: { :036076}  NEURO: { :305201}  PSYCH: { :968190}      WORKUP:  {ALLERGYWORKUP:494217}    ASSESSMENT/PLAN:  Gogo Bourgeois is a 40 year old female    ***      Thank you for allowing me to participate in the care of Gogo Bourgeois.      Zachary Olivas MD, FAAAAI  Allergy/Immunology  [facility]      Chart documentation done in part with Dragon Voice Recognition Software. Although reviewed after completion, some word and grammatical errors may remain.    Again, thank you for allowing me to participate in the care of your patient.        Sincerely,        Zachary Olivas MD

## 2020-07-16 NOTE — PROGRESS NOTES
Patient presented after waiting 30 minutes with no reaction to allergy injections. Discharged from clinic. Also per Dr. Olivas, patient is not required to bring an epi pen to allergy shot appointments anymore. Additionally, Dr. Olivas would like patient to have ice applied to arms prior to shots and for staff to avoid injecting in areas of scar tissues.    Casper Marks RN....7/16/2020 3:47 PM

## 2020-08-04 ENCOUNTER — ALLIED HEALTH/NURSE VISIT (OUTPATIENT)
Dept: ALLERGY | Facility: CLINIC | Age: 40
End: 2020-08-04
Payer: COMMERCIAL

## 2020-08-04 DIAGNOSIS — Z51.6 NEED FOR DESENSITIZATION TO ALLERGENS: Primary | ICD-10-CM

## 2020-08-04 DIAGNOSIS — J30.9 ALLERGIC RHINITIS: ICD-10-CM

## 2020-08-04 PROCEDURE — 99207 ZZC DROP WITH A PROCEDURE: CPT

## 2020-08-04 PROCEDURE — 95117 IMMUNOTHERAPY INJECTIONS: CPT

## 2020-08-04 NOTE — PROGRESS NOTES
Patient presented for her allergy shot appointment.  Advised patient writer was going to place ice on her arms for 15 minutes prior to getting her allergy shots.  Patient declined and stated she wanted the ice after the injection.  Allergy shots were given and then ice was placed on patient's arms.    Conchita Ny RN

## 2020-08-04 NOTE — PROGRESS NOTES
Patient presented after waiting 30 minutes with no reaction to allergy injections. Discharged from clinic.    Conchita Ny RN

## 2020-09-01 ENCOUNTER — ALLIED HEALTH/NURSE VISIT (OUTPATIENT)
Dept: ALLERGY | Facility: CLINIC | Age: 40
End: 2020-09-01
Payer: COMMERCIAL

## 2020-09-01 DIAGNOSIS — Z51.6 NEED FOR DESENSITIZATION TO ALLERGENS: Primary | ICD-10-CM

## 2020-09-01 PROCEDURE — 95117 IMMUNOTHERAPY INJECTIONS: CPT

## 2020-09-01 PROCEDURE — 99207 ZZC DROP WITH A PROCEDURE: CPT

## 2020-09-29 ENCOUNTER — TELEPHONE (OUTPATIENT)
Dept: ALLERGY | Facility: CLINIC | Age: 40
End: 2020-09-29

## 2020-09-29 ENCOUNTER — ALLIED HEALTH/NURSE VISIT (OUTPATIENT)
Dept: ALLERGY | Facility: CLINIC | Age: 40
End: 2020-09-29
Payer: COMMERCIAL

## 2020-09-29 DIAGNOSIS — J30.1 SEASONAL ALLERGIC RHINITIS DUE TO POLLEN: Primary | ICD-10-CM

## 2020-09-29 DIAGNOSIS — J30.89 ALLERGIC RHINITIS DUE TO MOLD: ICD-10-CM

## 2020-09-29 DIAGNOSIS — J30.1 CHRONIC SEASONAL ALLERGIC RHINITIS DUE TO POLLEN: ICD-10-CM

## 2020-09-29 PROCEDURE — 95117 IMMUNOTHERAPY INJECTIONS: CPT

## 2020-09-29 PROCEDURE — 99207 ZZC DROP WITH A PROCEDURE: CPT

## 2020-09-29 NOTE — TELEPHONE ENCOUNTER
Patient would like to receive allergy injections at the [facility], as it is closer to her home. Advised patient that writer would follow up and call [facility] on Thursday 10/1/2020 and let her know if that is an option for her at this time.  Ana Chakraborty MA

## 2020-09-29 NOTE — TELEPHONE ENCOUNTER
ALLERGY SOLUTION RE-ORDER REQUEST    Gogo Bourgeois 1980 MRN: [id number]    DATE NEEDED:  10-  Vial Color Content    Vial Size  Red 1:1 Trees    5ml  Red 1:1 Molds   5ml  Red 1:1   Weeds      5ml    Serum reorder consent signed and patient/parent was advised that new serums would be ordered through the pharmacy and billed to their insurance company when they arrive in clinic. Yes    Shot Clinic Location:  [facility]  Ship to Location: [facility]  Serum billed to:  Theresa        Requester Signature  Conchita Ny RN

## 2020-10-01 NOTE — TELEPHONE ENCOUNTER
Will contact [facility] to verify that they are open for allergy injections on Monday.     Hours:  Clinic hours:    Mondays and Fridays, 7:30 am to 5 pm  Tuesdays, 7 am to 3:40 pm  Injection hours:    Mondays and Fridays, 7:30 am to 4 pm  Tuesdays, 7 am to 3:15 pm  Appointments:  [phone]

## 2020-10-05 ENCOUNTER — COMMUNICATION - HEALTHEAST (OUTPATIENT)
Dept: ALLERGY | Facility: CLINIC | Age: 40
End: 2020-10-05

## 2020-10-05 NOTE — TELEPHONE ENCOUNTER
Left message at [facility] for a call back to verify that they are giving allergy injections, and that we are able to transfer patient's serums.    Ana Chakraborty MA

## 2020-10-05 NOTE — TELEPHONE ENCOUNTER
[place]  [facility]2020 [facility]    Ana Chakraborty MA

## 2020-10-05 NOTE — TELEPHONE ENCOUNTER
Spoke with patient to notify her that we are able to send her allergy serums to the [facility] in [place]. Advised patient that she will need to contact them to schedule her allergy injections with them at [phone]. Advised patient that I will cancel her current injection at the [facility], and that she should schedule a shot at [facility] for the week of 10/27/2020. Provided patient with the days and hours of injections offered at [facility] as it was posted on their website.     Patient's allergy serums packaged and all flowsheets and dosing instructions printed and labeled to be sent with patient's serums.

## 2020-10-08 DIAGNOSIS — Z51.6 NEED FOR DESENSITIZATION TO ALLERGENS: Primary | ICD-10-CM

## 2020-10-08 PROCEDURE — 95165 ANTIGEN THERAPY SERVICES: CPT | Performed by: ALLERGY & IMMUNOLOGY

## 2020-10-08 NOTE — TELEPHONE ENCOUNTER
Scheduled  to send serums to [facility] on 10-    Your Job ID is: [id number]     Conchita Ny RN

## 2020-10-08 NOTE — PROGRESS NOTES
Allergy serums billed at [place].     Vials billed below:      Vial Color Content                      Vial Size Expiration Date  Red 1:1 Molds 5mL  10/07/2021  Red 1:1 Trees 5mL  10/07/2021  Red 1:1 Weeds 5mL  10/07/2021    Original Refill encounter date: 09-      Signature  Conchita Ny RN

## 2020-10-08 NOTE — TELEPHONE ENCOUNTER
Allergy serums received at [place].     Vials received below:    Vial Color Content                      Vial Size Expiration Date  Red 1:1 Molds 5mL  10/07/2021  Red 1:1 Trees 5mL  10/07/2021  Red 1:1 Weeds 5mL  10/07/2021    Signature  Conchita Ny RN

## 2020-10-30 ENCOUNTER — AMBULATORY - HEALTHEAST (OUTPATIENT)
Dept: ALLERGY | Facility: CLINIC | Age: 40
End: 2020-10-30

## 2020-10-30 DIAGNOSIS — J30.1 SEASONAL ALLERGIC RHINITIS DUE TO POLLEN: ICD-10-CM

## 2020-12-13 ENCOUNTER — HEALTH MAINTENANCE LETTER (OUTPATIENT)
Age: 40
End: 2020-12-13

## 2020-12-22 ENCOUNTER — AMBULATORY - HEALTHEAST (OUTPATIENT)
Dept: ALLERGY | Facility: CLINIC | Age: 40
End: 2020-12-22

## 2020-12-22 DIAGNOSIS — J30.2 SEASONAL ALLERGIC RHINITIS DUE TO FUNGAL SPORES: ICD-10-CM

## 2020-12-22 DIAGNOSIS — J30.1 SEASONAL ALLERGIC RHINITIS DUE TO POLLEN: ICD-10-CM

## 2021-01-05 ENCOUNTER — AMBULATORY - HEALTHEAST (OUTPATIENT)
Dept: ALLERGY | Facility: CLINIC | Age: 41
End: 2021-01-05

## 2021-01-05 DIAGNOSIS — J30.1 SEASONAL ALLERGIC RHINITIS DUE TO POLLEN: ICD-10-CM

## 2021-01-19 ENCOUNTER — AMBULATORY - HEALTHEAST (OUTPATIENT)
Dept: ALLERGY | Facility: CLINIC | Age: 41
End: 2021-01-19

## 2021-01-19 DIAGNOSIS — J30.1 SEASONAL ALLERGIC RHINITIS DUE TO POLLEN: ICD-10-CM

## 2021-01-19 DIAGNOSIS — J30.2 SEASONAL ALLERGIC RHINITIS DUE TO FUNGAL SPORES: ICD-10-CM

## 2021-02-02 ENCOUNTER — AMBULATORY - HEALTHEAST (OUTPATIENT)
Dept: ALLERGY | Facility: CLINIC | Age: 41
End: 2021-02-02

## 2021-02-02 DIAGNOSIS — J30.2 SEASONAL ALLERGIC RHINITIS DUE TO FUNGAL SPORES: ICD-10-CM

## 2021-02-02 DIAGNOSIS — J30.1 SEASONAL ALLERGIC RHINITIS DUE TO POLLEN: ICD-10-CM

## 2021-03-02 ENCOUNTER — AMBULATORY - HEALTHEAST (OUTPATIENT)
Dept: ALLERGY | Facility: CLINIC | Age: 41
End: 2021-03-02

## 2021-03-02 DIAGNOSIS — J30.1 SEASONAL ALLERGIC RHINITIS DUE TO POLLEN: ICD-10-CM

## 2021-03-30 ENCOUNTER — AMBULATORY - HEALTHEAST (OUTPATIENT)
Dept: ALLERGY | Facility: CLINIC | Age: 41
End: 2021-03-30

## 2021-03-30 DIAGNOSIS — J30.1 SEASONAL ALLERGIC RHINITIS DUE TO POLLEN: ICD-10-CM

## 2021-04-14 NOTE — PROGRESS NOTES
Patient presented after waiting 30 minutes with no reaction to allergy injections. Discharged from clinic.  Lori Nguyen RN on 3/6/2018 at 3:28 PM    
[FreeTextEntry1] : He is a 52-year-old man who is seen today in follow-up.  He has been using Cialis 5 mg daily since July 2020.  He only has nocturia once every now and then.  Residual urine today was stable about 100 cc.  He has not been sexually very active recently but Cialis also improved spontaneous erections.\par \par Previous notes: Around February 2020, he underwent a CT scan for rib fracture. It showed a large prostate about 5 cm and normal kidneys. He had nocturia 2-3 times, urinary urgency, slow and interrupted urinary stream and sensation of double voiding. He also had difficulty maintaining erections. Desire is normal. PSA level was 2.9 in July 2020. He has history of hyperlipidemia and hypertension.

## 2021-04-17 ENCOUNTER — HEALTH MAINTENANCE LETTER (OUTPATIENT)
Age: 41
End: 2021-04-17

## 2021-04-27 ENCOUNTER — AMBULATORY - HEALTHEAST (OUTPATIENT)
Dept: ALLERGY | Facility: CLINIC | Age: 41
End: 2021-04-27

## 2021-04-27 DIAGNOSIS — J30.1 SEASONAL ALLERGIC RHINITIS DUE TO POLLEN: ICD-10-CM

## 2021-05-25 ENCOUNTER — AMBULATORY - HEALTHEAST (OUTPATIENT)
Dept: ALLERGY | Facility: CLINIC | Age: 41
End: 2021-05-25

## 2021-05-25 DIAGNOSIS — J30.1 SEASONAL ALLERGIC RHINITIS DUE TO POLLEN: ICD-10-CM

## 2021-06-12 NOTE — TELEPHONE ENCOUNTER
LMTS Allergy shots at [facility]. Transfer from [place].     Last shot was 09/29/20. Is due the week of 10/26/20.

## 2021-06-22 ENCOUNTER — AMBULATORY - HEALTHEAST (OUTPATIENT)
Dept: ALLERGY | Facility: CLINIC | Age: 41
End: 2021-06-22

## 2021-06-22 DIAGNOSIS — J30.1 SEASONAL ALLERGIC RHINITIS DUE TO POLLEN: ICD-10-CM

## 2021-08-02 ENCOUNTER — MEDICAL CORRESPONDENCE (OUTPATIENT)
Dept: HEALTH INFORMATION MANAGEMENT | Facility: CLINIC | Age: 41
End: 2021-08-02

## 2021-08-02 ENCOUNTER — ALLIED HEALTH/NURSE VISIT (OUTPATIENT)
Dept: ALLERGY | Facility: CLINIC | Age: 41
End: 2021-08-02
Payer: COMMERCIAL

## 2021-08-02 DIAGNOSIS — J30.1 SEASONAL ALLERGIC RHINITIS DUE TO POLLEN: ICD-10-CM

## 2021-08-02 DIAGNOSIS — J30.2 SEASONAL ALLERGIC RHINITIS DUE TO FUNGAL SPORES: Primary | ICD-10-CM

## 2021-08-02 PROCEDURE — 95117 IMMUNOTHERAPY INJECTIONS: CPT

## 2021-08-03 DIAGNOSIS — J30.1 CHRONIC SEASONAL ALLERGIC RHINITIS DUE TO POLLEN: ICD-10-CM

## 2021-08-03 DIAGNOSIS — J30.89 ALLERGIC RHINITIS DUE TO MOLD: ICD-10-CM

## 2021-08-03 NOTE — TELEPHONE ENCOUNTER
ALLERGY SOLUTION RE-ORDER REQUEST    Gogo Bourgeois 1980 MRN: [id number]    DATE NEEDED:  2 weeks  Vial Color Content    Vial Size  Red 1:1 Trees    5 ml  Red 1:1 Molds   5 ml  Red 1:1 Weeds    5 ml        Serum reorder consent signed and patient/parent was advised that new serums would be ordered through the pharmacy and billed to their insurance company when they arrive in clinic. Yes    Shot Clinic Location:  [facility]  Ship to Location: [address] [place] [address]  Serum billed to:  Theresa    Special Instructions:  NA        Requester Signature  Negin VASQUEZ MA

## 2021-08-12 DIAGNOSIS — J30.89 ALLERGIC RHINITIS DUE TO MOLD: ICD-10-CM

## 2021-08-12 DIAGNOSIS — J30.1 CHRONIC SEASONAL ALLERGIC RHINITIS DUE TO POLLEN: Primary | ICD-10-CM

## 2021-08-12 PROCEDURE — 95165 ANTIGEN THERAPY SERVICES: CPT | Performed by: ALLERGY & IMMUNOLOGY

## 2021-08-12 NOTE — PROGRESS NOTES
Allergy serums billed to [facility].     Vials billed below:    Vial Color Content                      Vial Size Expiration Date                            Red 1:1                                   Trees                                5 ml 7/28/2022  Red 1:1                                   Molds                                 5 ml 8/12/2022  Red 1:1                                   Weed                                     5 ml 8/12/2022    Checked by Greg FORBES  30 units billed  Signature  Kaylyn Reyes RN

## 2021-08-16 ENCOUNTER — ALLIED HEALTH/NURSE VISIT (OUTPATIENT)
Dept: ALLERGY | Facility: CLINIC | Age: 41
End: 2021-08-16
Payer: COMMERCIAL

## 2021-08-16 DIAGNOSIS — J30.2 SEASONAL ALLERGIC RHINITIS DUE TO FUNGAL SPORES: ICD-10-CM

## 2021-08-16 DIAGNOSIS — J30.89 ALLERGIC RHINITIS DUE TO MOLD: Primary | ICD-10-CM

## 2021-08-16 PROCEDURE — 95117 IMMUNOTHERAPY INJECTIONS: CPT

## 2021-09-13 ENCOUNTER — ALLIED HEALTH/NURSE VISIT (OUTPATIENT)
Dept: ALLERGY | Facility: CLINIC | Age: 41
End: 2021-09-13
Payer: COMMERCIAL

## 2021-09-13 DIAGNOSIS — J30.89 ALLERGIC RHINITIS DUE TO MOLD: ICD-10-CM

## 2021-09-13 DIAGNOSIS — J30.1 SEASONAL ALLERGIC RHINITIS DUE TO POLLEN: Primary | ICD-10-CM

## 2021-09-13 PROCEDURE — 95117 IMMUNOTHERAPY INJECTIONS: CPT

## 2021-09-26 ENCOUNTER — HEALTH MAINTENANCE LETTER (OUTPATIENT)
Age: 41
End: 2021-09-26

## 2021-09-27 ENCOUNTER — ALLIED HEALTH/NURSE VISIT (OUTPATIENT)
Dept: ALLERGY | Facility: CLINIC | Age: 41
End: 2021-09-27
Payer: COMMERCIAL

## 2021-09-27 DIAGNOSIS — J30.89 ALLERGIC RHINITIS DUE TO MOLD: Primary | ICD-10-CM

## 2021-09-27 DIAGNOSIS — J30.1 SEASONAL ALLERGIC RHINITIS DUE TO POLLEN: ICD-10-CM

## 2021-09-27 PROCEDURE — 95117 IMMUNOTHERAPY INJECTIONS: CPT

## 2021-10-11 ENCOUNTER — ALLIED HEALTH/NURSE VISIT (OUTPATIENT)
Dept: ALLERGY | Facility: CLINIC | Age: 41
End: 2021-10-11
Payer: COMMERCIAL

## 2021-10-11 DIAGNOSIS — J30.1 SEASONAL ALLERGIC RHINITIS DUE TO POLLEN: ICD-10-CM

## 2021-10-11 DIAGNOSIS — J30.89 ALLERGIC RHINITIS DUE TO MOLD: Primary | ICD-10-CM

## 2021-10-11 PROCEDURE — 95117 IMMUNOTHERAPY INJECTIONS: CPT

## 2021-11-08 ENCOUNTER — ALLIED HEALTH/NURSE VISIT (OUTPATIENT)
Dept: ALLERGY | Facility: CLINIC | Age: 41
End: 2021-11-08
Payer: COMMERCIAL

## 2021-11-08 DIAGNOSIS — J30.2 SEASONAL ALLERGIC RHINITIS DUE TO FUNGAL SPORES: ICD-10-CM

## 2021-11-08 DIAGNOSIS — J30.1 SEASONAL ALLERGIC RHINITIS DUE TO POLLEN: ICD-10-CM

## 2021-11-08 DIAGNOSIS — J30.89 ALLERGIC RHINITIS DUE TO MOLD: Primary | ICD-10-CM

## 2021-11-08 PROCEDURE — 95117 IMMUNOTHERAPY INJECTIONS: CPT

## 2021-12-06 ENCOUNTER — ALLIED HEALTH/NURSE VISIT (OUTPATIENT)
Dept: ALLERGY | Facility: CLINIC | Age: 41
End: 2021-12-06
Payer: COMMERCIAL

## 2021-12-06 DIAGNOSIS — J30.1 SEASONAL ALLERGIC RHINITIS DUE TO POLLEN: ICD-10-CM

## 2021-12-06 DIAGNOSIS — J30.89 ALLERGIC RHINITIS DUE TO MOLD: Primary | ICD-10-CM

## 2021-12-06 PROCEDURE — 95117 IMMUNOTHERAPY INJECTIONS: CPT

## 2022-01-21 ENCOUNTER — ALLIED HEALTH/NURSE VISIT (OUTPATIENT)
Dept: ALLERGY | Facility: CLINIC | Age: 42
End: 2022-01-21
Payer: COMMERCIAL

## 2022-01-21 DIAGNOSIS — J30.89 ALLERGIC RHINITIS DUE TO MOLD: Primary | ICD-10-CM

## 2022-01-21 PROCEDURE — 95117 IMMUNOTHERAPY INJECTIONS: CPT

## 2022-01-24 ENCOUNTER — ALLIED HEALTH/NURSE VISIT (OUTPATIENT)
Dept: ALLERGY | Facility: CLINIC | Age: 42
End: 2022-01-24
Payer: COMMERCIAL

## 2022-01-24 DIAGNOSIS — J30.89 ALLERGIC RHINITIS DUE TO MOLD: Primary | ICD-10-CM

## 2022-01-24 DIAGNOSIS — J30.1 SEASONAL ALLERGIC RHINITIS DUE TO POLLEN: ICD-10-CM

## 2022-01-24 DIAGNOSIS — J30.2 SEASONAL ALLERGIC RHINITIS DUE TO FUNGAL SPORES: ICD-10-CM

## 2022-01-24 PROCEDURE — 95117 IMMUNOTHERAPY INJECTIONS: CPT

## 2022-02-21 ENCOUNTER — ALLIED HEALTH/NURSE VISIT (OUTPATIENT)
Dept: ALLERGY | Facility: CLINIC | Age: 42
End: 2022-02-21
Payer: COMMERCIAL

## 2022-02-21 DIAGNOSIS — J30.1 SEASONAL ALLERGIC RHINITIS DUE TO POLLEN: ICD-10-CM

## 2022-02-21 DIAGNOSIS — J30.89 ALLERGIC RHINITIS DUE TO MOLD: Primary | ICD-10-CM

## 2022-02-21 PROCEDURE — 99207 PR DROP WITH A PROCEDURE: CPT

## 2022-02-21 PROCEDURE — 95117 IMMUNOTHERAPY INJECTIONS: CPT

## 2022-03-25 ENCOUNTER — ALLIED HEALTH/NURSE VISIT (OUTPATIENT)
Dept: ALLERGY | Facility: CLINIC | Age: 42
End: 2022-03-25
Payer: COMMERCIAL

## 2022-03-25 DIAGNOSIS — J30.89 ALLERGIC RHINITIS DUE TO MOLD: Primary | ICD-10-CM

## 2022-03-25 DIAGNOSIS — J30.2 SEASONAL ALLERGIC RHINITIS DUE TO FUNGAL SPORES: ICD-10-CM

## 2022-03-25 DIAGNOSIS — J30.1 SEASONAL ALLERGIC RHINITIS DUE TO POLLEN: ICD-10-CM

## 2022-03-25 PROCEDURE — 95117 IMMUNOTHERAPY INJECTIONS: CPT

## 2022-03-28 ENCOUNTER — OFFICE VISIT (OUTPATIENT)
Dept: ALLERGY | Facility: CLINIC | Age: 42
End: 2022-03-28
Payer: COMMERCIAL

## 2022-03-28 VITALS
BODY MASS INDEX: 28.51 KG/M2 | HEART RATE: 91 BPM | SYSTOLIC BLOOD PRESSURE: 120 MMHG | WEIGHT: 151 LBS | OXYGEN SATURATION: 100 % | DIASTOLIC BLOOD PRESSURE: 81 MMHG | HEIGHT: 61 IN

## 2022-03-28 DIAGNOSIS — J30.1 SEASONAL ALLERGIC RHINITIS DUE TO POLLEN: Primary | ICD-10-CM

## 2022-03-28 DIAGNOSIS — J30.89 ALLERGIC RHINITIS DUE TO MOLD: ICD-10-CM

## 2022-03-28 DIAGNOSIS — H10.13 ALLERGIC CONJUNCTIVITIS, BILATERAL: ICD-10-CM

## 2022-03-28 PROCEDURE — 99213 OFFICE O/P EST LOW 20 MIN: CPT | Performed by: ALLERGY & IMMUNOLOGY

## 2022-03-28 NOTE — PROGRESS NOTES
Gogo Bourgeois was seen in the Allergy Clinic at [facility].      Gogo Bourgeois is a 41 year old  /  female who is seen today for a follow-up visit. She began allergen immunotherapy treatment for allergic rhinoconjunctivitis in 2017 and reached her maintenance dose in 2018. She has no history of systemic or significant large local reactions to treatment. Overall she notes significant improvement in her symptoms though last spring she did have mild symptoms. Gogo takes antihistamines as needed during the spring and  seasons.    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 The home is heated with a electric furnace. They do have central air conditioning. The patient's bedroom is furnished with carpeting in bedroom.  Pets inside the house include None. There is no history of cockroach or mice infestation. There is/are 0 smokers in the house.  The house does  not have a damp basement.          SOCIAL HISTORY:     Gogo is employed as a . She lives with her 2 sons, 3 daughters and boyfriend.  Her boyfriend works in IT.        Past medical, family, and social history were reviewed.    REVIEW OF SYSTEMS:  General: negative for weight gain. negative for weight loss. negative for changes in sleep.   Eyes: negative for itching. negative for redness. negative for tearing/watering. negative for vision changes  Ears: negative for fullness. negative for hearing loss. negative for dizziness.   Nose: negative for snoring.negative for changes in smell. negative for drainage.   Throat: negative for hoarseness. negative for sore throat. negative for trouble swallowing.   Lungs: negative for cough. negative for shortness of breath.negative for wheezing. negative for sputum production.   Cardiovascular: negative for chest pain. negative for swelling of ankles. negative for fast or irregular heartbeat.   Gastrointestinal: negative for nausea. negative for heartburn. negative for acid reflux.   Musculoskeletal: negative for joint pain. negative for joint stiffness. negative for joint swelling.   Neurologic: negative for seizures. negative for fainting. negative for weakness.   Psychiatric: negative for changes in mood. negative for anxiety.   Endocrine: negative for cold intolerance. negative for heat intolerance. negative for tremors.   Hematologic: negative for easy bruising. negative for easy bleeding.  Integumentary: negative for rash. negative for scaling. negative for nail changes.       Current Outpatient Medications:      Ascorbic Acid (VITAMIN C) 500 MG CHEW, , Disp: , Rfl:      Multiple Vitamins-Minerals (HAIR SKIN AND NAILS FORMULA) TABS, , Disp: , Rfl:      ORDER FOR ALLERGEN IMMUNOTHERAPY, Name of Mix: Mix #3  Weeds Nettle 1:20 w/v, HS 0.5 ml Pigweed, Careless 1:20 w/v, HS 0.5 ml Plantain, English 1:20 w/v, HS 0.5 ml Diluent: HSA qs to 5ml, Disp: 5 mL, Rfl:  "PRN     ORDER FOR ALLERGEN IMMUNOTHERAPY, Name of Mix: Mix #2  Tree  Julio Cesar, White 1:20 w/v, HS  0.5 ml Birch Mix PRW 1:20 w/v, HS  0.5 ml Boxelder-Maple Mix BHR (Boxelder Hard Red) 1:20 w/v, HS  0.5 ml [place], Common 1:20 w/v, HS  0.5 ml Elm, American 1:20 w/v, HS  0.5 ml Hackberry 1:20 w/v, HS 0.5 ml Hickory, Shagbark 1:20 w/v, HS  0.5 ml [place]utton Mix RW 1:20 w/v, HS 0.5 ml [place], Black 1:20 w/v, HS 0.5 ml Diluent: HSA qs to 5ml, Disp: 5 mL, Rfl: PRN     ORDER FOR ALLERGEN IMMUNOTHERAPY, Name of Mix: Mix #1  Mold Alternaria Tenuis 1:10 w/v, HS  0.5 ml Hormodendrum Cladosporioides 1:10 w/v, HS 0.5 ml Diluent: HSA qs to 5ml, Disp: 5 mL, Rfl: PRN     Pediatric Multivit-Minerals-C (GUMMY VITAMINS & MINERALS) chewable tablet, Take 1 tablet by mouth daily, Disp: , Rfl:      cetirizine (ZYRTEC) 10 MG tablet, Take 1-2 tablets (10-20 mg) by mouth daily (Patient not taking: Reported on 3/28/2022), Disp: , Rfl:   No Known Allergies    EXAM:   /81   Pulse 91   Ht 1.549 m (5' 1\")   Wt 68.5 kg (151 lb)   SpO2 100%   BMI 28.53 kg/m    GENERAL APPEARANCE: alert, cooperative and not in distress  SKIN: no rashes, no lesions  HEAD: atraumatic, normocephalic  EYES: lids and lashes normal, conjunctivae and sclerae clear  ENT: no scars or lesions, nasal exam showed no discharge, swelling or lesions noted, tongue midline and normal, soft palate, uvula, and tonsils normal  NECK: no asymmetry, masses, or scars  LUNGS: unlabored respirations, no intercostal retractions or accessory muscle use, clear to auscultation without rales or wheezes  HEART: regular rate and rhythm without murmurs and normal S1 and S2  MUSCULOSKELETAL: no musculoskeletal defects are noted  NEURO: no focal deficits noted  PSYCH: does not appear depressed or anxious      WORKUP:  None    ASSESSMENT/PLAN:  Gogo Bourgeois is a 41 year old female here for a follow-up visit.    1. Seasonal allergic rhinitis due to pollen - Gogo has completed nearly 4 years of " allergen immunotherapy treatment at her maintenance dose. She reports significant improvement in her symptoms. We reviewed the risks and benefits of continued treatment as well as the recommended duration of treatment. She wishes to continue immunotherapy at this time.    - continue allergen immunotherapy per protocol  - continue taking cetirizine - 10mg daily as needed    2. Allergic rhinitis due to mold - see above    3. Allergic conjunctivitis, bilateral - see above      Follow-up in 1 year, sooner if needed      Thank you for allowing me to participate in the care of Gogo FORBES Bourgeois.      Zachary Olivas MD, FAAAAI  Allergy/Immunology  [facility]      Chart documentation done in part with Dragon Voice Recognition Software. Although reviewed after completion, some word and grammatical errors may remain.

## 2022-03-28 NOTE — LETTER
3/28/2022         RE: Gogo Bourgeois  [address]  [place] [address]        Dear Colleague,    Thank you for referring your patient, Gogo Bourgeois, to [facility]. Please see a copy of my visit note below.    Gogo Bourgeois was seen in the Allergy Clinic at [facility].      Gogo Bourgeois is a 41 year old  /  female who is seen today for a follow-up visit. She began allergen immunotherapy treatment for allergic rhinoconjunctivitis in 2017 and reached her maintenance dose in 2018. She has no history of systemic or significant large local reactions to treatment. Overall she notes significant improvement in her symptoms though last spring she did have mild symptoms. Gogo takes antihistamines as needed during the spring and  seasons.    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 The home is heated with a electric furnace.  They do have central air conditioning. The patient's bedroom is furnished with carpeting in bedroom.  Pets inside the house include None. There is no history of cockroach or mice infestation. There is/are 0 smokers in the house.  The house does not have a damp basement.          SOCIAL HISTORY:     Gogo is employed as a . She lives with her 2 sons, 3 daughters and boyfriend.  Her boyfriend works in IT.        Past medical, family, and social history were reviewed.    REVIEW OF SYSTEMS:  General: negative for weight gain. negative for weight loss. negative for changes in sleep.   Eyes: negative for itching. negative for redness. negative for tearing/watering. negative for vision changes  Ears: negative for fullness. negative for hearing loss. negative for dizziness.   Nose: negative for snoring.negative for changes in smell. negative for drainage.   Throat: negative for hoarseness. negative for sore throat. negative for trouble swallowing.   Lungs: negative for cough. negative for shortness of breath.negative for wheezing. negative for sputum production.   Cardiovascular: negative for chest pain. negative for swelling of ankles. negative for fast or irregular heartbeat.   Gastrointestinal: negative for nausea. negative for heartburn. negative for acid reflux.   Musculoskeletal: negative for joint pain. negative for joint stiffness. negative for joint swelling.   Neurologic: negative for seizures. negative for fainting. negative for weakness.   Psychiatric: negative for changes in mood. negative for anxiety.   Endocrine: negative for cold intolerance. negative for heat intolerance. negative for tremors.   Hematologic: negative for easy bruising. negative for easy bleeding.  Integumentary: negative for rash. negative for scaling. negative for nail changes.       Current Outpatient Medications:      Ascorbic Acid (VITAMIN C) 500 MG CHEW, , Disp: , Rfl:      Multiple Vitamins-Minerals (HAIR SKIN AND NAILS  "FORMULA) TABS, , Disp: , Rfl:      ORDER FOR ALLERGEN IMMUNOTHERAPY, Name of Mix: Mix #3  Weeds Nettle 1:20 w/v, HS 0.5 ml Pigweed, Careless 1:20 w/v, HS 0.5 ml Plantain, English 1:20 w/v, HS 0.5 ml Diluent: HSA qs to 5ml, Disp: 5 mL, Rfl: PRN     ORDER FOR ALLERGEN IMMUNOTHERAPY, Name of Mix: Mix #2  Tree  Julio Cesar, White 1:20 w/v, HS  0.5 ml Birch Mix PRW 1:20 w/v, HS  0.5 ml Boxelder-Maple Mix BHR (Boxelder Hard Red) 1:20 w/v, HS  0.5 ml [place], Common 1:20 w/v, HS  0.5 ml Elm, American 1:20 w/v, HS  0.5 ml Hackberry 1:20 w/v, HS 0.5 ml Hickory, Shagbark 1:20 w/v, HS  0.5 ml [place] Mix RW 1:20 w/v, HS 0.5 ml Veteran, Black 1:20 w/v, HS 0.5 ml Diluent: HSA qs to 5ml, Disp: 5 mL, Rfl: PRN     ORDER FOR ALLERGEN IMMUNOTHERAPY, Name of Mix: Mix #1  Mold Alternaria Tenuis 1:10 w/v, HS  0.5 ml Hormodendrum Cladosporioides 1:10 w/v, HS 0.5 ml Diluent: HSA qs to 5ml, Disp: 5 mL, Rfl: PRN     Pediatric Multivit-Minerals-C (GUMMY VITAMINS & MINERALS) chewable tablet, Take 1 tablet by mouth daily, Disp: , Rfl:      cetirizine (ZYRTEC) 10 MG tablet, Take 1-2 tablets (10-20 mg) by mouth daily (Patient not taking: Reported on 3/28/2022), Disp: , Rfl:   No Known Allergies    EXAM:   /81   Pulse 91   Ht 1.549 m (5' 1\")   Wt 68.5 kg (151 lb)   SpO2 100%   BMI 28.53 kg/m    GENERAL APPEARANCE: alert, cooperative and not in distress  SKIN: no rashes, no lesions  HEAD: atraumatic, normocephalic  EYES: lids and lashes normal, conjunctivae and sclerae clear  ENT: no scars or lesions, nasal exam showed no discharge, swelling or lesions noted, tongue midline and normal, soft palate, uvula, and tonsils normal  NECK: no asymmetry, masses, or scars  LUNGS: unlabored respirations, no intercostal retractions or accessory muscle use, clear to auscultation without rales or wheezes  HEART: regular rate and rhythm without murmurs and normal S1 and S2  MUSCULOSKELETAL: no musculoskeletal defects are noted  NEURO: no focal deficits " noted  PSYCH: does not appear depressed or anxious      WORKUP:  None    ASSESSMENT/PLAN:  Gogo Bourgeois is a 41 year old female here for a follow-up visit.    1. Seasonal allergic rhinitis due to pollen - Gogo has completed nearly 4 years of allergen immunotherapy treatment at her maintenance dose. She reports significant improvement in her symptoms. We reviewed the risks and benefits of continued treatment as well as the recommended duration of treatment. She wishes to continue immunotherapy at this time.    - continue allergen immunotherapy per protocol  - continue taking cetirizine - 10mg daily as needed    2. Allergic rhinitis due to mold - see above    3. Allergic conjunctivitis, bilateral - see above      Follow-up in 1 year, sooner if needed      Thank you for allowing me to participate in the care of Gogo Bourgeois.      Zachary Olivas MD, [facility]  Allergy/Immunology  [facility]      Chart documentation done in part with Dragon Voice Recognition Software. Although reviewed after completion, some word and grammatical errors may remain.        Again, thank you for allowing me to participate in the care of your patient.        Sincerely,        Zachary Olivas MD

## 2022-03-28 NOTE — PATIENT INSTRUCTIONS
If you have any questions regarding your allergies, asthma, or what we discussed during your visit today please call the allergy clinic or contact us via [employer].    [facility] Allergy RN Line: [phone] - call this number with any questions during or after business/clinic hours  [facility] Scheduling Line: [phone]  [facility] Scheduling Line: [phone] - this number is ONLY for scheduling at the [facility] and should not be used to get in touch with the allergy team    All visits for food challenges, medication/drug allergy testing, and drug challenges MUST be scheduled through the allergy clinic nurse. Please call the nurse at [phone] or send a [employer] message for scheduling. Appointments for these visits that are made through the schedulers or via [employer] may be cancelled or rescheduled.    Clinic Schedule:   [place] - Monday, Tuesday, and Thursday  [address] [place] [address]    [facility] - Wednesday  [facility] [place] [address]

## 2022-03-29 DIAGNOSIS — J30.89 ALLERGIC RHINITIS DUE TO MOLD: ICD-10-CM

## 2022-03-29 DIAGNOSIS — J30.1 CHRONIC SEASONAL ALLERGIC RHINITIS DUE TO POLLEN: ICD-10-CM

## 2022-03-29 NOTE — TELEPHONE ENCOUNTER
ALLERGY SOLUTION RE-ORDER REQUEST    Gogo Bourgeois 1980 MRN: [id number]    DATE NEEDED:  4-    Vial Color                               Content                                              Vial Size  Red 1:1                                   Trees                                                   5 ml  Red 1:1                                   Molds                                                  5 ml  Red 1:1                                   Weeds                                                 5 ml           Serum reorder consent signed and patient/parent was advised that new serums would be ordered through the pharmacy and billed to their insurance company when they arrive in clinic. Yes     [facility] Clinic Location:  [facility]  Ship to Location: [address] [place] [address]  Serum billed to:  Theresa     Special Instructions:  NA    Requester Signature  Conchita TAYLOR RN

## 2022-04-04 PROBLEM — J30.1 SEASONAL ALLERGIC RHINITIS DUE TO POLLEN: Status: ACTIVE | Noted: 2017-06-15

## 2022-04-07 DIAGNOSIS — J30.89 ALLERGIC RHINITIS DUE TO MOLD: Primary | ICD-10-CM

## 2022-04-07 DIAGNOSIS — J30.1 CHRONIC SEASONAL ALLERGIC RHINITIS DUE TO POLLEN: ICD-10-CM

## 2022-04-07 PROCEDURE — 95165 ANTIGEN THERAPY SERVICES: CPT | Performed by: ALLERGY & IMMUNOLOGY

## 2022-04-07 NOTE — PROGRESS NOTES
Allergy serums billed to [facility].     Vials billed below:    Vial Color Content                      Vial Size Expiration Date  Red 1:1 Trees 5mL  4/7/23  Red 1:1 Molds 5mL  4/7/23  Red 1:1 Weeds 5mL  4/7/23    Billed 30 units    Checked by Camron Jaime / LPN        Signature  Camron Jaime LPN

## 2022-05-06 ENCOUNTER — ALLIED HEALTH/NURSE VISIT (OUTPATIENT)
Dept: ALLERGY | Facility: CLINIC | Age: 42
End: 2022-05-06
Payer: COMMERCIAL

## 2022-05-06 DIAGNOSIS — J30.89 ALLERGIC RHINITIS DUE TO MOLD: Primary | ICD-10-CM

## 2022-05-06 PROCEDURE — 95117 IMMUNOTHERAPY INJECTIONS: CPT

## 2022-05-08 ENCOUNTER — HEALTH MAINTENANCE LETTER (OUTPATIENT)
Age: 42
End: 2022-05-08

## 2022-05-17 ENCOUNTER — ALLIED HEALTH/NURSE VISIT (OUTPATIENT)
Dept: ALLERGY | Facility: CLINIC | Age: 42
End: 2022-05-17
Payer: COMMERCIAL

## 2022-05-17 DIAGNOSIS — J30.89 ALLERGIC RHINITIS DUE TO MOLD: Primary | ICD-10-CM

## 2022-05-17 DIAGNOSIS — J30.1 CHRONIC SEASONAL ALLERGIC RHINITIS DUE TO POLLEN: ICD-10-CM

## 2022-05-17 DIAGNOSIS — J30.1 SEASONAL ALLERGIC RHINITIS DUE TO POLLEN: ICD-10-CM

## 2022-05-17 PROCEDURE — 95117 IMMUNOTHERAPY INJECTIONS: CPT

## 2022-05-27 ENCOUNTER — ALLIED HEALTH/NURSE VISIT (OUTPATIENT)
Dept: ALLERGY | Facility: CLINIC | Age: 42
End: 2022-05-27
Payer: COMMERCIAL

## 2022-05-27 DIAGNOSIS — J30.1 CHRONIC SEASONAL ALLERGIC RHINITIS DUE TO POLLEN: ICD-10-CM

## 2022-05-27 DIAGNOSIS — J30.89 ALLERGIC RHINITIS DUE TO MOLD: Primary | ICD-10-CM

## 2022-05-27 DIAGNOSIS — J30.1 SEASONAL ALLERGIC RHINITIS DUE TO POLLEN: ICD-10-CM

## 2022-05-27 PROCEDURE — 95117 IMMUNOTHERAPY INJECTIONS: CPT

## 2022-06-07 ENCOUNTER — ALLIED HEALTH/NURSE VISIT (OUTPATIENT)
Dept: ALLERGY | Facility: CLINIC | Age: 42
End: 2022-06-07
Payer: COMMERCIAL

## 2022-06-07 DIAGNOSIS — J30.89 ALLERGIC RHINITIS DUE TO MOLD: Primary | ICD-10-CM

## 2022-06-07 PROCEDURE — 95117 IMMUNOTHERAPY INJECTIONS: CPT

## 2022-06-17 ENCOUNTER — ALLIED HEALTH/NURSE VISIT (OUTPATIENT)
Dept: ALLERGY | Facility: CLINIC | Age: 42
End: 2022-06-17
Payer: COMMERCIAL

## 2022-06-17 DIAGNOSIS — J30.1 SEASONAL ALLERGIC RHINITIS DUE TO POLLEN: ICD-10-CM

## 2022-06-17 DIAGNOSIS — J30.89 ALLERGIC RHINITIS DUE TO MOLD: Primary | ICD-10-CM

## 2022-06-17 PROCEDURE — 95117 IMMUNOTHERAPY INJECTIONS: CPT

## 2022-07-12 ENCOUNTER — ALLIED HEALTH/NURSE VISIT (OUTPATIENT)
Dept: ALLERGY | Facility: CLINIC | Age: 42
End: 2022-07-12
Payer: COMMERCIAL

## 2022-07-12 DIAGNOSIS — J30.89 ALLERGIC RHINITIS DUE TO MOLD: Primary | ICD-10-CM

## 2022-07-12 DIAGNOSIS — J30.1 SEASONAL ALLERGIC RHINITIS DUE TO POLLEN: ICD-10-CM

## 2022-07-12 PROCEDURE — 99207 PR DROP WITH A PROCEDURE: CPT

## 2022-07-12 PROCEDURE — 95117 IMMUNOTHERAPY INJECTIONS: CPT

## 2022-08-12 ENCOUNTER — ALLIED HEALTH/NURSE VISIT (OUTPATIENT)
Dept: ALLERGY | Facility: CLINIC | Age: 42
End: 2022-08-12
Payer: COMMERCIAL

## 2022-08-12 DIAGNOSIS — J30.89 ALLERGIC RHINITIS DUE TO MOLD: Primary | ICD-10-CM

## 2022-08-12 DIAGNOSIS — J30.1 CHRONIC SEASONAL ALLERGIC RHINITIS DUE TO POLLEN: ICD-10-CM

## 2022-08-12 DIAGNOSIS — J30.1 SEASONAL ALLERGIC RHINITIS DUE TO POLLEN: ICD-10-CM

## 2022-08-12 PROCEDURE — 95117 IMMUNOTHERAPY INJECTIONS: CPT

## 2022-09-09 ENCOUNTER — ALLIED HEALTH/NURSE VISIT (OUTPATIENT)
Dept: ALLERGY | Facility: CLINIC | Age: 42
End: 2022-09-09
Payer: COMMERCIAL

## 2022-09-09 DIAGNOSIS — J30.1 SEASONAL ALLERGIC RHINITIS DUE TO POLLEN: ICD-10-CM

## 2022-09-09 DIAGNOSIS — J30.89 ALLERGIC RHINITIS DUE TO MOLD: Primary | ICD-10-CM

## 2022-09-09 PROCEDURE — 95117 IMMUNOTHERAPY INJECTIONS: CPT

## 2022-10-07 ENCOUNTER — ALLIED HEALTH/NURSE VISIT (OUTPATIENT)
Dept: ALLERGY | Facility: CLINIC | Age: 42
End: 2022-10-07
Payer: COMMERCIAL

## 2022-10-07 DIAGNOSIS — J30.1 SEASONAL ALLERGIC RHINITIS DUE TO POLLEN: ICD-10-CM

## 2022-10-07 DIAGNOSIS — J30.89 ALLERGIC RHINITIS DUE TO MOLD: Primary | ICD-10-CM

## 2022-10-07 PROCEDURE — 95117 IMMUNOTHERAPY INJECTIONS: CPT

## 2022-10-07 PROCEDURE — 99207 PR DROP WITH A PROCEDURE: CPT

## 2022-11-04 ENCOUNTER — ALLIED HEALTH/NURSE VISIT (OUTPATIENT)
Dept: ALLERGY | Facility: CLINIC | Age: 42
End: 2022-11-04
Payer: COMMERCIAL

## 2022-11-04 DIAGNOSIS — J30.89 ALLERGIC RHINITIS DUE TO MOLD: Primary | ICD-10-CM

## 2022-11-04 PROCEDURE — 95117 IMMUNOTHERAPY INJECTIONS: CPT

## 2022-11-04 NOTE — PROGRESS NOTES
Gogo ANDREI Bourgeois presents to clinic today at the request of Zachary Olivas MD (ordering provider) for Allergy Immunotherapy injection(s).       This service provided today was under the care of Chloe Guillen MD; the supervising provider of the day; who was available if needed.    Shirlene Flynn

## 2022-12-02 ENCOUNTER — ALLIED HEALTH/NURSE VISIT (OUTPATIENT)
Dept: ALLERGY | Facility: CLINIC | Age: 42
End: 2022-12-02
Payer: COMMERCIAL

## 2022-12-02 DIAGNOSIS — J30.89 ALLERGIC RHINITIS DUE TO MOLD: Primary | ICD-10-CM

## 2022-12-02 PROCEDURE — 95117 IMMUNOTHERAPY INJECTIONS: CPT

## 2022-12-02 NOTE — PROGRESS NOTES
Gogo ANDREI Bourgeois presents to clinic today at the request of Zachary Olivas MD (ordering provider) for Allergy Immunotherapy injection(s).       This service provided today was under the care of Chloe Guillen MD; the supervising provider of the day; who was available if needed.    Shirlene Levine

## 2023-04-23 ENCOUNTER — HEALTH MAINTENANCE LETTER (OUTPATIENT)
Age: 43
End: 2023-04-23

## 2024-02-26 NOTE — PROGRESS NOTES
Patient presented after waiting 30 minutes with no reaction to allergy injections. Discharged from clinic.  Lori Nguyen RN on 4/10/2018 at 1:22 PM     No

## 2024-06-30 ENCOUNTER — HEALTH MAINTENANCE LETTER (OUTPATIENT)
Age: 44
End: 2024-06-30

## 2024-09-08 ENCOUNTER — HEALTH MAINTENANCE LETTER (OUTPATIENT)
Age: 44
End: 2024-09-08

## 2024-09-12 ENCOUNTER — HOSPITAL ENCOUNTER (EMERGENCY)
Facility: CLINIC | Age: 44
Discharge: HOME | End: 2024-09-12
Admitting: PHYSICIAN ASSISTANT
Payer: COMMERCIAL

## 2024-09-12 ENCOUNTER — APPOINTMENT (OUTPATIENT)
Dept: ULTRASOUND IMAGING | Facility: CLINIC | Age: 44
End: 2024-09-12
Attending: PHYSICIAN ASSISTANT
Payer: COMMERCIAL

## 2024-09-12 VITALS
OXYGEN SATURATION: 100 % | WEIGHT: 160 LBS | TEMPERATURE: 98.1 F | BODY MASS INDEX: 30.21 KG/M2 | SYSTOLIC BLOOD PRESSURE: 160 MMHG | HEIGHT: 61 IN | RESPIRATION RATE: 16 BRPM | HEART RATE: 77 BPM | DIASTOLIC BLOOD PRESSURE: 66 MMHG

## 2024-09-12 DIAGNOSIS — S80.10XA: ICD-10-CM

## 2024-09-12 LAB
ANION GAP SERPL CALCULATED.3IONS-SCNC: 10 MMOL/L (ref 7–15)
BASOPHILS # BLD AUTO: 0 10E3/UL (ref 0–0.2)
BASOPHILS NFR BLD AUTO: 0 %
BUN SERPL-MCNC: 16.5 MG/DL (ref 6–20)
CALCIUM SERPL-MCNC: 8.8 MG/DL (ref 8.8–10.4)
CHLORIDE SERPL-SCNC: 102 MMOL/L (ref 98–107)
CREAT SERPL-MCNC: 0.84 MG/DL (ref 0.51–0.95)
EGFRCR SERPLBLD CKD-EPI 2021: 88 ML/MIN/1.73M2
EOSINOPHIL # BLD AUTO: 0.1 10E3/UL (ref 0–0.7)
EOSINOPHIL NFR BLD AUTO: 1 %
ERYTHROCYTE [DISTWIDTH] IN BLOOD BY AUTOMATED COUNT: 12 % (ref 10–15)
GLUCOSE SERPL-MCNC: 100 MG/DL (ref 70–99)
HCO3 SERPL-SCNC: 27 MMOL/L (ref 22–29)
HCT VFR BLD AUTO: 38.7 % (ref 35–47)
HGB BLD-MCNC: 13.1 G/DL (ref 11.7–15.7)
IMM GRANULOCYTES # BLD: 0 10E3/UL
IMM GRANULOCYTES NFR BLD: 1 %
LYMPHOCYTES # BLD AUTO: 2.1 10E3/UL (ref 0.8–5.3)
LYMPHOCYTES NFR BLD AUTO: 36 %
MCH RBC QN AUTO: 30.2 PG (ref 26.5–33)
MCHC RBC AUTO-ENTMCNC: 33.9 G/DL (ref 31.5–36.5)
MCV RBC AUTO: 89 FL (ref 78–100)
MONOCYTES # BLD AUTO: 0.5 10E3/UL (ref 0–1.3)
MONOCYTES NFR BLD AUTO: 8 %
NEUTROPHILS # BLD AUTO: 3.3 10E3/UL (ref 1.6–8.3)
NEUTROPHILS NFR BLD AUTO: 54 %
NRBC # BLD AUTO: 0 10E3/UL
NRBC BLD AUTO-RTO: 0 /100
PLATELET # BLD AUTO: 311 10E3/UL (ref 150–450)
POTASSIUM SERPL-SCNC: 4.1 MMOL/L (ref 3.4–5.3)
RBC # BLD AUTO: 4.34 10E6/UL (ref 3.8–5.2)
SODIUM SERPL-SCNC: 139 MMOL/L (ref 135–145)
WBC # BLD AUTO: 6 10E3/UL (ref 4–11)

## 2024-09-12 PROCEDURE — 99284 EMERGENCY DEPT VISIT MOD MDM: CPT | Mod: 25

## 2024-09-12 PROCEDURE — 85025 COMPLETE CBC W/AUTO DIFF WBC: CPT | Performed by: PHYSICIAN ASSISTANT

## 2024-09-12 PROCEDURE — 93971 EXTREMITY STUDY: CPT | Mod: RT

## 2024-09-12 PROCEDURE — 36415 COLL VENOUS BLD VENIPUNCTURE: CPT | Performed by: PHYSICIAN ASSISTANT

## 2024-09-12 PROCEDURE — 80048 BASIC METABOLIC PNL TOTAL CA: CPT | Performed by: PHYSICIAN ASSISTANT

## 2024-09-12 RX ORDER — IBUPROFEN 600 MG/1
600 TABLET, FILM COATED ORAL ONCE
Status: COMPLETED | OUTPATIENT
Start: 2024-09-12 | End: 2024-09-12

## 2024-09-12 ASSESSMENT — COLUMBIA-SUICIDE SEVERITY RATING SCALE - C-SSRS
1. IN THE PAST MONTH, HAVE YOU WISHED YOU WERE DEAD OR WISHED YOU COULD GO TO SLEEP AND NOT WAKE UP?: NO
6. HAVE YOU EVER DONE ANYTHING, STARTED TO DO ANYTHING, OR PREPARED TO DO ANYTHING TO END YOUR LIFE?: NO
2. HAVE YOU ACTUALLY HAD ANY THOUGHTS OF KILLING YOURSELF IN THE PAST MONTH?: NO
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
## 2024-09-12 NOTE — ED NOTES
Primary assessment done by provider in lobby RN discussed discharge with pt - pt will follow up with PMD as needed.

## 2024-09-12 NOTE — DISCHARGE INSTRUCTIONS
As we discussed, your ultrasound and blood work were reassuring.  Please watch the area closely over the next several days.  You may ice the area for 20 minutes every 2-3 hours, elevate, compress with an Ace wrap if it is painful.  I would expect this to resolve within the next week.  If at any point you develop fevers or chills, increasing bruising, swelling, redness, or warmth of the calf or any new or concerning symptoms please return to the ER for further evaluation.

## 2024-09-12 NOTE — ED NOTES
Department is full.  Did inform patient that providers do occasionally will see patient's in a semi private area to do exams, but that their cares may start while in the ED lobby.  Patient seemed to understand. Ritu Good RN  3:39 PM 9/12/2024

## 2024-09-12 NOTE — ED TRIAGE NOTES
24 hours ago noted large bruise and swelling on her right calf.  No injury noted.  Limping yesterday.  Acetaminophen yesterday for headache.  Family urged to be evaluated.  Pmhx includes having gastric sleeve 13 years.     Triage Assessment (Adult)       Row Name 09/12/24 1536          Skin Circulation/Temperature WDL    Skin Circulation/Temperature WDL X  brusing right calf        Cardiac WDL    Cardiac WDL WDL        RLE Neurovascular Assessment    Temperature RLE warm     Color RLE --  see other note

## 2025-04-20 ENCOUNTER — HEALTH MAINTENANCE LETTER (OUTPATIENT)
Age: 45
End: 2025-04-20